# Patient Record
Sex: FEMALE | Race: WHITE | Employment: OTHER | URBAN - METROPOLITAN AREA
[De-identification: names, ages, dates, MRNs, and addresses within clinical notes are randomized per-mention and may not be internally consistent; named-entity substitution may affect disease eponyms.]

---

## 2017-01-30 DIAGNOSIS — E11.40 TYPE 2 DIABETES MELLITUS WITH DIABETIC NEUROPATHY (H): Primary | ICD-10-CM

## 2017-01-30 DIAGNOSIS — E11.40 TYPE 2 DIABETES MELLITUS WITH DIABETIC NEUROPATHY, UNSPECIFIED LONG TERM INSULIN USE STATUS: Primary | ICD-10-CM

## 2017-01-30 DIAGNOSIS — E11.69 TYPE 2 DIABETES MELLITUS WITH OTHER SPECIFIED COMPLICATION (H): Primary | ICD-10-CM

## 2017-01-30 RX ORDER — GLIPIZIDE 10 MG/1
10 TABLET, FILM COATED, EXTENDED RELEASE ORAL DAILY
Qty: 90 TABLET | Refills: 1 | Status: CANCELLED | OUTPATIENT
Start: 2017-01-30

## 2017-01-30 RX ORDER — GLIPIZIDE 10 MG/1
10 TABLET, FILM COATED, EXTENDED RELEASE ORAL DAILY
Qty: 90 TABLET | Refills: 2 | Status: SHIPPED
Start: 2017-01-30 | End: 2017-02-23 | Stop reason: DRUGHIGH

## 2017-01-30 NOTE — TELEPHONE ENCOUNTER
glipiZIDE (GLIPIZIDE XL) 10 MG 24 hr tablet      Last Written Prescription Date:  3/30/2016  Last Fill Quantity: 90,   # refills: 2  Last Office Visit : 9/14/2016  Future Office visit:  Not scheduled    CREATININE   Date Value Ref Range Status   04/27/2016 0.59 0.52 - 1.04 mg/dL Final   ]  LDL CHOLESTEROL CALCULATED   Date Value Ref Range Status   12/29/2016 57 <100 mg/dL Final     Comment:     Desirable:       <100 mg/dl   ]    MICROL       <5   9/2/2015     BP Readings from Last 1 Encounters:   09/14/16 121/72     A1C      9.5   12/29/2016           Routing refill request to provider for review/approval because:  Per protocol.  Lab overdue- last Microalbumin > 12 months since last done

## 2017-01-30 NOTE — TELEPHONE ENCOUNTER
insulin pen needle 31G X 8 MM       Last Written Prescription Date:  1/25/2016  Last Fill Quantity: 200,   # refills: 10  Last Office Visit : 9/14/2016  Future Office visit:  Not scheduled

## 2017-02-08 ENCOUNTER — OFFICE VISIT (OUTPATIENT)
Dept: PHARMACY | Facility: CLINIC | Age: 72
End: 2017-02-08
Payer: COMMERCIAL

## 2017-02-08 ENCOUNTER — OFFICE VISIT (OUTPATIENT)
Dept: FAMILY MEDICINE | Facility: CLINIC | Age: 72
End: 2017-02-08

## 2017-02-08 VITALS
OXYGEN SATURATION: 98 % | HEART RATE: 82 BPM | SYSTOLIC BLOOD PRESSURE: 136 MMHG | RESPIRATION RATE: 16 BRPM | DIASTOLIC BLOOD PRESSURE: 77 MMHG

## 2017-02-08 DIAGNOSIS — Z12.11 SCREEN FOR COLON CANCER: ICD-10-CM

## 2017-02-08 DIAGNOSIS — E11.41 TYPE 2 DIABETES MELLITUS WITH DIABETIC MONONEUROPATHY, UNSPECIFIED LONG TERM INSULIN USE STATUS: Primary | ICD-10-CM

## 2017-02-08 DIAGNOSIS — F33.0 MAJOR DEPRESSIVE DISORDER, RECURRENT EPISODE, MILD (H): Primary | ICD-10-CM

## 2017-02-08 DIAGNOSIS — E78.5 HYPERLIPIDEMIA LDL GOAL <100: ICD-10-CM

## 2017-02-08 DIAGNOSIS — R52 OCCASIONAL PAIN: ICD-10-CM

## 2017-02-08 DIAGNOSIS — F33.40 MAJOR DEPRESSIVE DISORDER, RECURRENT, IN REMISSION (H): ICD-10-CM

## 2017-02-08 DIAGNOSIS — E11.40 TYPE 2 DIABETES MELLITUS WITH DIABETIC NEUROPATHY, UNSPECIFIED LONG TERM INSULIN USE STATUS: ICD-10-CM

## 2017-02-08 DIAGNOSIS — Z86.0100 HISTORY OF COLONIC POLYPS: ICD-10-CM

## 2017-02-08 DIAGNOSIS — R07.89 CHEST TIGHTNESS OR PRESSURE: ICD-10-CM

## 2017-02-08 LAB
ANION GAP SERPL CALCULATED.3IONS-SCNC: 10 MMOL/L (ref 3–14)
BUN SERPL-MCNC: 11 MG/DL (ref 7–30)
CALCIUM SERPL-MCNC: 9.3 MG/DL (ref 8.5–10.1)
CHLORIDE SERPL-SCNC: 105 MMOL/L (ref 94–109)
CO2 SERPL-SCNC: 25 MMOL/L (ref 20–32)
CREAT SERPL-MCNC: 0.64 MG/DL (ref 0.52–1.04)
GFR SERPL CREATININE-BSD FRML MDRD: ABNORMAL ML/MIN/1.7M2
GLUCOSE SERPL-MCNC: 178 MG/DL (ref 70–99)
INTERPRETATION ECG - MUSE: NORMAL
POTASSIUM SERPL-SCNC: 4.1 MMOL/L (ref 3.4–5.3)
SODIUM SERPL-SCNC: 140 MMOL/L (ref 133–144)

## 2017-02-08 PROCEDURE — 99605 MTMS BY PHARM NP 15 MIN: CPT | Performed by: PHARMACIST

## 2017-02-08 PROCEDURE — 99607 MTMS BY PHARM ADDL 15 MIN: CPT | Performed by: PHARMACIST

## 2017-02-08 RX ORDER — LIRAGLUTIDE 6 MG/ML
1.8 INJECTION SUBCUTANEOUS DAILY
Qty: 9 ML | Refills: 0 | Status: SHIPPED | OUTPATIENT
Start: 2017-02-08 | End: 2017-03-23

## 2017-02-08 RX ORDER — NITROGLYCERIN 0.4 MG/1
TABLET SUBLINGUAL
Qty: 25 TABLET | Refills: 0 | Status: SHIPPED | OUTPATIENT
Start: 2017-02-08 | End: 2019-01-09

## 2017-02-08 RX ORDER — FLUOXETINE 10 MG/1
20 CAPSULE ORAL DAILY
Qty: 60 CAPSULE | Refills: 1 | Status: SHIPPED | OUTPATIENT
Start: 2017-02-08 | End: 2017-04-01

## 2017-02-08 RX ORDER — ACETAMINOPHEN 500 MG
500-1000 TABLET ORAL EVERY 6 HOURS PRN
COMMUNITY

## 2017-02-08 ASSESSMENT — PAIN SCALES - GENERAL: PAINLEVEL: NO PAIN (1)

## 2017-02-08 NOTE — PATIENT INSTRUCTIONS
Primary Care Center Medication Refill Request Information:  * Please contact your pharmacy regarding ANY request for medication refills.  ** [facility] Prescription Fax = [phone]  * Please allow 3 business days for routine medication refills.  * Please allow 5 business days for controlled substance medication refills.     Primary Care Center Test Result notification information:  *You will be notified with in 7-10 days of your appointment day regarding the results of your test.  If you are on MyChart you will be notified as soon as the provider has reviewed the results and signed off on them.    Primary Care Center Phone Number [phone]    Nuclear Medicine  Adenosine Thallium    Name: Lindsay Nieves  YOB: 1945  MRN: [id number]      DATE:  2/8/2017   TIME:  1:06pm      PREP INFO:      NOTHING TO EAT OR DRINK 4 HOURS PRIOR     NO CAFFEINE 24 HOURS PRIOR (COFFEE, DECAF, TEA, SODA)    LOCATION:  REPORT TO THE [facility] WAITING ROOM ON THE 2ND FLOOR, (STREET LEVEL) OF THE [facility], AT THE [facility].    QUESTIONS OR NEED TO RESCHEDULE:  CALL [phone]

## 2017-02-08 NOTE — MR AVS SNAPSHOT
After Visit Summary   2/8/2017    Lindsay Nieves    MRN: [id number]           Patient Information     Date Of Birth          1945        Visit Information        Provider Department      2/8/2017 11:30 AM Maryam Clayton MD PHD [facility]        Today's Diagnoses     Type 2 diabetes mellitus with diabetic mononeuropathy, unspecified long term insulin use status (H)    -  1     Screen for colon cancer         History of colonic polyps         Chest tightness or pressure         Major depressive disorder, recurrent, in remission (H)           Care Instructions    Primary Care Center Medication Refill Request Information:  * Please contact your pharmacy regarding ANY request for medication refills.  ** [facility] Prescription Fax = [phone]  * Please allow 3 business days for routine medication refills.  * Please allow 5 business days for controlled substance medication refills.     Primary Care Center Test Result notification information:  *You will be notified with in 7-10 days of your appointment day regarding the results of your test.  If you are on MyChart you will be notified as soon as the provider has reviewed the results and signed off on them.    Primary Care Center Phone Number [phone]    Nuclear Medicine  Adenosine Thallium    Name: Lindsay Nieves  YOB: 1945  MRN: [id number]      DATE:  2/8/2017   TIME:  1:06pm      PREP INFO:      NOTHING TO EAT OR DRINK 4 HOURS PRIOR     NO CAFFEINE 24 HOURS PRIOR (COFFEE, DECAF, TEA, SODA)    LOCATION:  REPORT TO THE [facility] WAITING ROOM ON THE 2ND FLOOR, (STREET LEVEL) OF THE [facility], AT THE [facility].    QUESTIONS OR NEED TO RESCHEDULE:  CALL [phone]         Follow-ups after your visit        Additional Services     GASTROENTEROLOGY ADULT REF PROCEDURE ONLY       Last Lab Result: CREATININE (mg/dL)  Hx of adenomatous polyps -- having some hx of chest pain which will be  evaluated        Date                     Value                 04/27/2016               0.59             ----------  There is no weight on file to calculate BMI.     Needed:  No  Language:  English    Patient will be contacted to schedule procedure.     Please be aware that coverage of these services is subject to the terms and limitations of your health insurance plan.  Call member services at your health plan with any benefit or coverage questions.  Any procedures must be performed at a [place] facility OR coordinated by your clinic's referral office.    Please bring the following with you to your appointment:    (1) Any X-Rays, CTs or MRIs which have been performed.  Contact the facility where they were done to arrange for  prior to your scheduled appointment.    (2) List of current medications   (3) This referral request   (4) Any documents/labs given to you for this referral                  Follow-up notes from your care team     Return in about 1 month (around 3/8/2017).      Your next 10 appointments already scheduled     Feb 13, 2017 12:30 PM   NM INJECTION with UUNMINJ1   [facility], Nuclear Medicine ([facility])    [address] [place] [address] [id number] [phone]            Feb 13, 2017  1:15 PM   NM SCAN3 with UUNM1   [facility], Nuclear Medicine ([facility])    [address] [place] [address] [id number] [phone]            Feb 13, 2017  1:45 PM   Ekg Stress Nm Adenosine with UUEKGNMS   [facility] ([facility])    [address] [place] [phone]               Feb 13, 2017  2:45 PM   NM MPI ADENOSINE with UUNM1   [facility], Nuclear Medicine ([facility])    [address] [place] [address] [id number] [phone]           For a ONE day exam: Allow 3-4  hours for test. For a TWO day exam: Allow 2 hours PER day for test.  You may need to stop some medicines before the test. Follow your doctor s orders. - If you take a beta blocker: Follow your doctor s specific instructions on taking it prior to and on the day of your exam. - If you take Aggrenox or dipyridamole (Persantine, Permole), stop taking it 48 hours before your test. - If you take Viagra, Cialis or Levitra, stop taking it 48 hours before your test. - If you take theophylline or aminophylline, stop taking it 12 hours before your test.  For patients with diabetes: - If you take insulin, call your diabetes care team. Ask if you should take a 1/2 dose the morning of your test. - If you take diabetes medicine by mouth, don t take it on the morning of your test. Bring it with you to take after the test. (If you have questions, call your diabetes care team.)  Do not take nitrates on the day of your test. Do not wear your Nitro-Patch.  Stop all caffeine 12 hours before the test. This includes coffee, tea, soda pop, chocolate and certain medicines (such as Anacin, Excedrin and NoDoz). Also avoid decaf coffee and tea, as these contain small amounts of caffeine.  No alcohol, smoking or other tobacco for 12 hours before the test.  Stop eating 3 hours before the test. You may drink water.  Please wear a loose two-piece outfit. If you will have an exercise test, bring rubber-soled walking shoes.  When you arrive, please tell us if you: - Have diabetes - Are breastfeeding - May be pregnant - Have a pacemaker of ICD (implantable defibrillator).  Please call your Imaging Department at your exam site with any questions.            Feb 22, 2017  1:30 PM   TELEMEDICINE with Marita Jolley [facility] Medication Therapy Management ([facility])    [address] [place] [id number] [phone]           Note: this is not an onsite visit; there is no need to come to  the facility.            Mar 22, 2017 11:30 AM   (Arrive by 11:15 AM)   Return Visit with Maryam Clayton MD PHD   [facility] ([facility])    [address] [address] [phone]              Future tests that were ordered for you today     Open Future Orders        Priority Expected Expires Ordered    GASTROENTEROLOGY ADULT REF PROCEDURE ONLY Routine 2017    NM Adenosine stress test Routine 2017            Who to contact     Please call your clinic at [phone] to:    Ask questions about your health    Make or cancel appointments    Discuss your medicines    Learn about your test results    Speak to your doctor   If you have compliments or concerns about an experience at your clinic, or if you wish to file a complaint, please contact [facility] Patient Relations at [phone] or email us at [email or web address]         Additional Information About Your Visit        [employer] Information     Backpack is an electronic gateway that provides easy, online access to your medical records. With Backpack, you can request a clinic appointment, read your test results, renew a prescription or communicate with your care team.     To sign up for Backpack visit the website at [email or web address]   You will be asked to enter the access code listed below, as well as some personal information. Please follow the directions to create your username and password.     Your access code is: [email or web address]  Expires: 3/15/2017  6:30 AM     Your access code will  in 90 days. If you need help or a new code, please contact your [facility] Clinic or call [phone] for assistance.        Care EveryWhere ID     This is your Care EveryWhere ID. This could be used by other organizations to access your [facility] medical records  [id number]         Your Vitals Were     Pulse Respirations Pulse Oximetry Breastfeeding?          82 16 98% No         Blood Pressure from Last 3 Encounters:   02/08/17 136/77   09/14/16 121/72   05/12/16 117/64    Weight from Last 3 Encounters:   05/12/16 97.523 kg (215 lb)   04/27/16 97.523 kg (215 lb)   12/05/13 86.183 kg (190 lb)              We Performed the Following     Basic metabolic panel     EKG 12-LEAD CLINIC READ          Today's Medication Changes          These changes are accurate as of: 2/8/17  1:37 PM.  If you have any questions, ask your nurse or doctor.               Start taking these medicines.        Dose/Directions    nitroglycerin 0.4 MG sublingual tablet   Commonly known as:  NITROSTAT   Used for:  Chest tightness or pressure   Started by:  Maryam Clayton MD PHD        For chest pain place 1 tablet under the tongue every 5 minutes for 3 doses. If symptoms persist 5 minutes after 1st dose call 911.   Quantity:  25 tablet   Refills:  0         These medicines have changed or have updated prescriptions.        Dose/Directions    FLUoxetine 10 MG capsule   Commonly known as:  PROzac   This may have changed:  how much to take   Used for:  Major depressive disorder, recurrent episode, mild (H)   Changed by:  Marita Jolley RPH        Dose:  20 mg   Take 2 capsules (20 mg) by mouth daily   Quantity:  60 capsule   Refills:  1       liraglutide 18 MG/3ML soln   Commonly known as:  VICTOZA PEN   This may have changed:  how much to take   Used for:  Type 2 diabetes mellitus with diabetic neuropathy, unspecified long term insulin use status (H)   Changed by:  Marita Jolley RPH        Dose:  1.8 mg   Inject 1.8 mg Subcutaneous daily   Quantity:  9 mL   Refills:  0            Where to get your medicines      These medications were sent to [facility] [id number] AT [facility]  [address] [place] [id number]     Phone:  [phone]    - FLUoxetine  10 MG capsule  - liraglutide 18 MG/3ML soln  - nitroglycerin 0.4 MG sublingual tablet             Primary Care Provider Office Phone # Fax #    Maryam Clayton MD [phone]        [facility] [address] [facility] [address]  [facility] [address]        Thank you!     Thank you for choosing [facility]  for your care. Our goal is always to provide you with excellent care. Hearing back from our patients is one way we can continue to improve our services. Please take a few minutes to complete the written survey that you may receive in the mail after your visit with us. Thank you!             Your Updated Medication List - Protect others around you: Learn how to safely use, store and throw away your medicines at www.disposemymeds.org.          This list is accurate as of: 2/8/17  1:37 PM.  Always use your most recent med list.                   Brand Name Dispense Instructions for use    acetaminophen 500 MG tablet    TYLENOL     Take 500-1,000 mg by mouth every 6 hours as needed for mild pain       ADVIL 200 MG tablet   Generic drug:  ibuprofen      Take 200 mg by mouth every 4 hours as needed.       aspirin 81 MG tablet      Take 81 mg by mouth daily       blood glucose monitoring lancets     1 Box    Use to test blood sugar 2-3 times daily or as directed.  Ok to substitute alternative if insurance prefers.       blood glucose monitoring meter device kit     1 kit    Use to test blood sugar 2-3 times daily or as directed.  Ok to substitute alternative if insurance prefers.       blood glucose monitoring test strip    ACCU-CHEK CORTEZ PLUS    1 Box    Use to test blood sugar 2-3 times daily or as directed.  Ok to substitute alternative if insurance prefers.       calcium-vitamin D-vitamin K 500-500-40 MG-UNT-MCG Chew    VIACTIV     Take 1 tablet by mouth daily       FLUoxetine 10 MG capsule    PROzac    60 capsule    Take 2 capsules (20 mg) by mouth daily       glipiZIDE 10 MG 24 hr  tablet    glipiZIDE XL    90 tablet    Take 1 tablet (10 mg) by mouth daily (morning with breakfast)       insulin pen needle 31G X 8 MM     90 each    Use with Victoza injections once daily.       liraglutide 18 MG/3ML soln    VICTOZA PEN    9 mL    Inject 1.8 mg Subcutaneous daily       metFORMIN 1000 MG tablet    GLUCOPHAGE    180 tablet    Take 1 tablet (1,000 mg) by mouth 2 times daily (with meals)       MULTIVITAMIN WOMEN 50+ PO      Take 1 tablet by mouth daily       nitroglycerin 0.4 MG sublingual tablet    NITROSTAT    25 tablet    For chest pain place 1 tablet under the tongue every 5 minutes for 3 doses. If symptoms persist 5 minutes after 1st dose call 911.       pravastatin 40 MG tablet    PRAVACHOL    180 tablet    Take 2 tablets (80 mg) by mouth daily (please dispense as 40mg tabs)       vitamin D 2000 UNITS tablet      Take 1 tablet by mouth daily.

## 2017-02-08 NOTE — PATIENT INSTRUCTIONS
Recommendations from today's MTM visit:                                                    MTM (medication therapy management) is a service provided by a clinical pharmacist designed to help you get the most of out of your medicines.   Today we reviewed what your medicines are for, how to know if they are working, that your medicines are safe and how to make your medicine regimen as easy as possible.     1. Let me know if you are not given instructions on when to stop metformin.    2. Increase your fluoxetine to 20mg (two 10mg tablets) daily.    3. Increase your Victoza to 1.8mg daily. If you note radiating abdominal pain, go to the emergency room.    Next MTM visit: I will call on the 22nd at 1:30pm. Please have your blood sugars with you.    To schedule another MTM appointment, please call the clinic directly or you may call the MTM scheduling line at [phone] or toll-free at [phone].     My Clinical Pharmacist's contact information:                                                      It was a pleasure seeing you today!  Please feel free to contact me with any questions or concerns you have.      Marita Jolley PharmD  Medication Therapy Management Provider  Phone:[phone]  Pager: [phone]    You may receive a survey about the MTM services you received.  I would appreciate your feedback to help me serve you better in the future. Please fill it out and return it when you can. Your comments will be anonymous.

## 2017-02-08 NOTE — PROGRESS NOTES
SUBJECTIVE/OBJECTIVE:                                                    Lindsay Nieves is a 71 year old female coming in for a follow-up visit for Medication Therapy Management.  She was referred to me from Dr. Clayton.     Chief Complaint: Follow up from our visit on 9/14/16.  Pt is here to discuss elevated blood sugars    Tobacco: No tobacco use   Alcohol: Less than 1 beverages / week    Medication Adherence: no issues reported    Diabetes:  Pt currently taking metformin 1000mg BID, Victoza 1.2mg daily, glipizide 10mg in the morning. Pt is not experiencing side effects.  SMBG: one time daily, less when she's depressed, twice a day on good days.   Ranges (patient reported): 125-130 in the morning, pt not getting many evening readings.  Frequency of hypoglycemia? rarely.  Recent symptoms of high blood sugar? none  Eye exam: up to date  Foot exam: up to date  Microalbumin is < 30 mg/g. Pt is not taking an ACEi/ARB.  Aspirin: Taking 81mg daily and denies side effects  Diet/Exercise: Pt is trying to exercise regularly but is realizing that she needs to get into a daily routine so that she doesn't excuse herself from doing it. She admits also that she has been eating less well since Thanksgiving because she has been more depressed lately.   Pt did get flu shot this winter.    Depression: Pt feels acutely depressed and so she was increased from fluoxetine 10mg to 20mg daily by Dr. Clayton today to see if this improves her mood.    Arthritis: Pt continues to use APAP as needed and occasionally does use ibuprofen or aspirin when her back pain is intense. She knows that she is ideally supposed to use ibuprofen sparingly and aspirin not at all for pain per our previous discussions.    Angina: Pt is having upper chest pain off and on that does not radiate down her arms. She was prescribed nitroglycerin by Dr. Clayton today and will be getting an EKG after our visit today.    Hyperlipidemia: Current therapy includes pravastatin  80mg once daily.  Pt reports no significant myalgias or other side effects.   The 10-year ASCVD risk score (Tommygarcia SHELTON Jr., et al., 2013) is: 15.7%    Values used to calculate the score:      Age: 71 years      Sex: Female      Is an : No      Diabetic: Yes      Tobacco smoker: No      Systolic Blood Pressure: 136 mmHg      Prescribed Antihypertensives: No      HDL Cholesterol: 63 mg/dL      Total Cholesterol: 153 mg/dL    Current labs include:  BP Readings from Last 3 Encounters:   02/08/17 136/77   09/14/16 121/72   05/12/16 117/64     Today's Vitals: There were no vitals taken for this visit. Vitals from visit with Dr. Clayton today (wt declined):  Vital Signs 2/8/2017   Systolic 136   Diastolic 77   Pulse 82   Temperature    Respirations 16   Weight (LB) (No Data)     A1C      9.5   12/29/2016.  CHOL      153   12/29/2016  TRIG      164   12/29/2016  HDL       63   12/29/2016  LDL       57   12/29/2016    Liver Function Studies -   Recent Labs   Lab Test  02/25/13   0826   PROTTOTAL  7.2   ALBUMIN  4.0   BILITOTAL  0.5   ALKPHOS  74   AST  20   ALT  30       Lab Results   Component Value Date    UCRR 46 09/02/2015    MICROL <5 09/02/2015    UMALCR Unable to calculate due to low value 09/02/2015       Last Basic Metabolic Panel:  NA      140   4/27/2016   POTASSIUM      4.3   4/27/2016  CHLORIDE      107   4/27/2016  BUN       13   4/27/2016  CR     0.59   4/27/2016  GFR ESTIMATE   Date Value Ref Range Status   04/27/2016 >90  Non  GFR Calc   >60 mL/min/1.7m2 Final   09/02/2015 >90  Non  GFR Calc   >60 mL/min/1.7m2 Final   10/10/2014 90 >60 mL/min/1.7m2 Final     Comment:     Non  GFR Calc     GFR ESTIMATE IF BLACK   Date Value Ref Range Status   04/27/2016 >90   GFR Calc   >60 mL/min/1.7m2 Final   09/02/2015 >90   GFR Calc   >60 mL/min/1.7m2 Final   10/10/2014 >90   GFR Calc   >60 mL/min/1.7m2 Final     TSH    Date Value Ref Range Status   06/11/2014 1.73 0.4 - 5.0 mU/L Final   ]    Most Recent Immunizations   Administered Date(s) Administered     Hepatitis B 09/14/2016     Influenza (High Dose) 3 valent vaccine 09/26/2016     Influenza (IIV3) 10/03/2012     Pneumococcal (PCV 13) 11/03/2014     Pneumococcal (PCV 7) 04/12/2006     Pneumococcal 23 valent 02/25/2013     TDAP (BOOSTRIX AGES 10-64) 02/25/2013     Tetanus 09/19/2005     Zoster vaccine, live 05/18/2009     ASSESSMENT:                                                    Current medications were reviewed today as discussed above.  Medicare Part D topics discussed:Recommendations to doctor and Medication changes    Medication Adherence: no issues identified    Diabetes: Needs Improvement. Patient is not meeting A1c goal of < 7%. Self monitoring of blood glucose is not at goal of post prandial < 180 mg/dL. Pt would benefit from SFU (glipizide) :  increase dose to 20mg daily  GLP-1 agonist (Victoza) :  increase dose to 1.8mg daily  Increased exercise  Increase in home glucose monitoring.  To avoid ambiguity of reaction to increasing the SFU and GLP-1, we will do one at a time.  Pt would also benefit from treatment of her depression (see below).    Depression: Improving. Pt would benefit from increase in SSRI as planned with Dr. Clayton, watching for increase in anxiety which can occasionally occur with fluoxetine.    Arthritis: Stable.    Angina: Outside of scope. Pt to follow up with Dr. Clayton as instructed.    Hyperlipidemia: Stable.     PLAN:                                                      1. Pt to increase fluoxetine from 10mg to 20mg daily as already discussed with Dr. Clayton. Order sent to avoid insurance coverage issues.    2. Pt to increase Victoza to 1.8mg daily. Order sent.    I spent 60 minutes with this patient today (an extra 15 minutes was spent creating the Medication Action Plan).  All changes were made via collaborative practice agreement with  Maryam Clayton. A copy of the visit note was provided to the patient's primary care provider.     Will follow up in 2 weeks, at which point we will likely add glipizide if blood sugars are not at goal.    The patient was given a summary of these recommendations as an after visit summary.    Marita Jolley PharmD  Medication Therapy Management Provider  Phone:[phone]  Pager: [phone]

## 2017-02-08 NOTE — Clinical Note
"     [facility] MEDICATION THERAPY MANAGEMENT     Date: 2017    Lindsay Nieves  [address] [place] [address]    Dear Ms. Nieves,    Thank you for talking with me on 2017 about your health and medications. Medicare s MTM (Medication Therapy Management) program helps you understand your medications and use them safely.      This letter includes an action plan (Medication Action Plan) and medication list (Personal Medication List). The action plan has steps you should take to help you get the best results from your medications. The medication list will help you keep track of your medications and how to use them the right way.       Have your action plan and medication list with you when you talk with your doctors, pharmacists, and other healthcare providers in your care team.     Ask your doctors, pharmacists, and other healthcare providers to update the action plan and medication list at every visit.     Take your medication list with you if you go to the hospital or emergency room.     Give a copy of the action plan and medication list to your family or caregivers.     If you want to talk about this letter or any of the papers with it, please call   [phone].   We look forward to working with you, your doctors, and other healthcare providers to help you stay healthy through the [facility].     Sincerely,    Marita Jolley      Enclosed: Medication Action Plan and Personal Medication List    MEDICATION ACTION PLAN FOR Lindsay Nieves,  1945     This action plan will help you get the best results from your medications if you:   1. Read \"What we talked about.\"   2. Take the steps listed in the \"What I need to do\" boxes.   3. Fill in \"What I did and when I did it.\"   4. Fill in \"My follow-up plan\" and \"Questions I want to ask.\"     Have this action plan with you when you talk with your doctors, pharmacists, and other healthcare providers in your care team. Share this " with your family or caregivers too.  DATE PREPARED: 2017  What we talked about: You have been feeling more down and would like to increase your fluoxetine                                                  What I need to do:  your new prescription for fluoxetine and take 20mg daily. Let us know if your anxiety worsens.   What I did and when I did it:                                              What we talked about: Your blood sugars and A1c are high                                                What I need to do:  your new prescription for Victoza and take 1.8mg daily. Go to the ER if you have abdominal pain.   What I did and when I did it:                                              My follow-up plan:                 Questions I want to ask:              If you have any questions about your action plan, call Marita Jolley, Phone: [phone] , Monday-Friday 8-4:30pm.           MEDICATION LIST FOR Lindsay Nieves,  1945     This medication list was made for you after we talked. We also used information from your doctor's chart.      Use blank rows to add new medications. Then fill in the dates you started using them.    Cross out medications when you no longer use them. Then write the date and why you stopped using them.    Ask your doctors, pharmacists, and other healthcare providers to update this list at every visit. Keep this list up-to-date with:       Prescription medications    Over the counter drugs     Herbals    Vitamins    Minerals      If you go to the hospital or emergency room, take this list with you. Share this with your family or caregivers too.     DATE PREPARED: 2017  Allergies or side effects: Review of patient's allergies indicates no known allergies.     Medication:  ACCU-CHEK CORTEZ PLUS W/DEVICE KIT      How I use it:  Use to test blood sugar 2-3 times daily or as directed.  Ok to substitute alternative if insurance prefers.      Why I use it:  Diabetes     Prescriber:  Maryam Clayton MD PHD      Date I started using it:       Date I stopped using it:         Why I stopped using it:            Medication:  ACCU-CHEK SOFTCLIX LANCETS MISC      How I use it:  Use to test blood sugar 2-3 times daily or as directed.  Ok to substitute alternative if insurance prefers.      Why I use it: Type 2 diabetes mellitus with diabetic neuropathy (H)    Prescriber:  Maryam Clayton MD PHD      Date I started using it:       Date I stopped using it:         Why I stopped using it:            Medication:  ACETAMINOPHEN 500 MG PO TABS      How I use it:  Take 500-1,000 mg by mouth every 6 hours as needed for mild pain      Why I use it:  Pain    Prescriber:  Patient Reported      Date I started using it:       Date I stopped using it:         Why I stopped using it:            Medication:  ASPIRIN 81 MG PO TABS      How I use it:  Take 81 mg by mouth daily      Why I use it:  Heart Health    Prescriber:  Patient Reported      Date I started using it:       Date I stopped using it:         Why I stopped using it:            Medication:  CALCIUM-VITAMIN D-VITAMIN K 500-500-40 MG-UNT-MCG PO CHEW      How I use it:  Take 1 tablet by mouth daily      Why I use it:  Bone health    Prescriber:  Patient Reported      Date I started using it:       Date I stopped using it:         Why I stopped using it:            Medication:  FLUOXETINE HCL 10 MG PO CAPS      How I use it:  Take 1 capsule (10 mg) by mouth daily      Why I use it: Major depressive disorder, recurrent, in remission (H)    Prescriber:  Maryam Clayton MD PHD      Date I started using it:       Date I stopped using it:         Why I stopped using it:            Medication:  GLIPIZIDE ER 10 MG PO TB24      How I use it:  Take 1 tablet (10 mg) by mouth daily (morning with breakfast)      Why I use it: Type 2 diabetes mellitus with diabetic neuropathy, unspecified long term insulin use status (H)    Prescriber:  Maryam  Faisal Clayton MD PHD      Date I started using it:       Date I stopped using it:         Why I stopped using it:            Medication:  GLUCOSE BLOOD VI STRP      How I use it:  Use to test blood sugar 2-3 times daily or as directed.  Ok to substitute alternative if insurance prefers.      Why I use it: Type 2 diabetes mellitus with diabetic neuropathy (H)    Prescriber:  Maryam Clayton MD PHD      Date I started using it:       Date I stopped using it:         Why I stopped using it:            Medication:  IBUPROFEN 200 MG PO TABS      How I use it:  Take 200 mg by mouth every 4 hours as needed.      Why I use it:  Pain    Prescriber:  Patient Reported      Date I started using it:       Date I stopped using it:         Why I stopped using it:            Medication:  INSULIN PEN NEEDLE 31G X 8 MM MISC      How I use it:  Use with Victoza injections once daily.      Why I use it: Type 2 diabetes mellitus with other specified complication (H)    Prescriber:  Maryam Clayton MD PHD      Date I started using it:       Date I stopped using it:         Why I stopped using it:            Medication:  LIRAGLUTIDE 18 MG/3ML SC SOPN      How I use it:  Inject 1.2 mg Subcutaneous daily      Why I use it: Type 2 diabetes mellitus with diabetic polyneuropathy (H)    Prescriber:  Maryam Clayton MD PHD      Date I started using it:       Date I stopped using it:         Why I stopped using it:            Medication:  METFORMIN HCL 1000 MG PO TABS      How I use it:  Take 1 tablet (1,000 mg) by mouth 2 times daily (with meals)      Why I use it: Type 2 diabetes mellitus with diabetic polyneuropathy (H)    Prescriber:  Maryam Clayton MD PHD      Date I started using it:       Date I stopped using it:         Why I stopped using it:            Medication:  MULTIVITAMIN WOMEN 50+ PO      How I use it:  Take 1 tablet by mouth daily      Why I use it:  General health    Prescriber:  Patient Reported      Date I  started using it:       Date I stopped using it:         Why I stopped using it:            Medication:  NITROGLYCERIN 0.4 MG SL SUBL      How I use it:  For chest pain place 1 tablet under the tongue every 5 minutes for 3 doses. If symptoms persist 5 minutes after 1st dose call 911.      Why I use it: Chest tightness or pressure    Prescriber:  Maryam Clayton MD PHD      Date I started using it:       Date I stopped using it:         Why I stopped using it:            Medication:  PRAVASTATIN SODIUM 40 MG PO TABS      How I use it:  Take 2 tablets (80 mg) by mouth daily (please dispense as 40mg tabs)      Why I use it: Hyperlipidemia LDL goal <100    Prescriber:  Maryam Clayton MD PHD      Date I started using it:       Date I stopped using it:         Why I stopped using it:            Medication:  VITAMIN D 2000 UNITS PO TABS      How I use it:  Take 1 tablet by mouth daily.       Why I use it:      Prescriber:  Patient Reported      Date I started using it:       Date I stopped using it:         Why I stopped using it:            Medication:         How I use it:         Why I use it:      Prescriber:         Date I started using it:       Date I stopped using it:         Why I stopped using it:            Medication:         How I use it:         Why I use it:      Prescriber:         Date I started using it:       Date I stopped using it:         Why I stopped using it:            Medication:         How I use it:         Why I use it:      Prescriber:         Date I started using it:       Date I stopped using it:         Why I stopped using it:              Other Information:     If you have any questions about your action plan, call [phone].    According to the Paperwork Reduction Act of 1995, no persons are required to respond to a collection of information unless it displays a valid OMB control number. The valid B number for this information collection is [id number]. The time required to  complete this information collection is estimated to average 40 minutes per response, including the time to review instructions, searching existing data resources, gather the data needed, and complete and review the information collection. If you have any comments concerning the accuracy of the time estimate(s) or suggestions for improving this form, please write to: CMS, Attn: [employer] Reports Clearance Officer, [address] [place] [phone].

## 2017-02-08 NOTE — Clinical Note
Patient:  Lindsay Nieves  :   1945  MRN:     [id number]        Ms. Lindsay Nieves  [address] [place] [address]        2017    Dear Ms. Nieves,    Thank you for choosing the [facility] for your healthcare needs.  We appreciate the opportunity to serve you.    The following are your recent test results.     Your kidney function is normal - check with the cardiac lab as to when they want you to stop the metformin  Maryam Clayton MD, PhD    Results for orders placed or performed in visit on 17   Basic metabolic panel   Result Value Ref Range    Sodium 140 133 - 144 mmol/L    Potassium 4.1 3.4 - 5.3 mmol/L    Chloride 105 94 - 109 mmol/L    Carbon Dioxide 25 20 - 32 mmol/L    Anion Gap 10 3 - 14 mmol/L    Glucose 178 (H) 70 - 99 mg/dL    Urea Nitrogen 11 7 - 30 mg/dL    Creatinine 0.64 0.52 - 1.04 mg/dL    GFR Estimate >90  Non  GFR Calc   >60 mL/min/1.7m2    GFR Estimate If Black >90   GFR Calc   >60 mL/min/1.7m2    Calcium 9.3 8.5 - 10.1 mg/dL   EKG 12-LEAD CLINIC READ   Result Value Ref Range    Interpretation ECG Click View Image link to view waveform and result

## 2017-02-08 NOTE — PROGRESS NOTES
SUBJECTIVE:  Lindsay Nieves  Is a 71 year old female who presents today for follow up of diabetes mellitus type .2.    Patient is being treated with ASA, oral agents, diet, Byetta/Victoza and oral agents,diet and exercise.   On Metformin 100mg bid and glipizide 10 mg q AM   Patient glucose self monitoring as follows: one time daily, before breakfast , once daily.   Results as follows: fasting glucose- 130  @lastdm3@    BP:   BP Readings from Last 3 Encounters:   02/08/17 136/77   09/14/16 121/72   05/12/16 117/64      Does not check blood pressures outside clinic visits.    Habits:   Social History   Substance Use Topics     Smoking status: Former Smoker     Quit date: 09/20/2000     Smokeless tobacco: Never Used     Alcohol Use: 0.0 oz/week     0 Standard drinks or equivalent per week      Comment:  minimal - 1 drink/wk or 1 drink/mo     Diet: trys to follow the dieet but is having problelm with depression and eats when depressed  ASA  Yes   Exercise no regular exercise program [facility]     Current Concerns  Patient : is concerned about chest pain - mid anterior chest  - 1-3 times/wk - lasts a few minutes,  Has 's nitro  But has not tried it. Does not wake her up - is more frequent.      Also is feeling depressed - not suicidal  Is on prozac.     Patient Active Problem List   Diagnosis     Major depressive disorder, recurrent episode, mild (H)     Acne rosacea     Hyperlipidemia LDL goal <100     Disorder of bone and cartilage     Obesity     Cataracts, bilateral     Vitreous degeneration     History of colonic polyps     ACP (advance care planning)     Type 2 diabetes mellitus with diabetic neuropathy, unspecified long term insulin use status (H)       Medication allergies and current medications reviewed.  See EMR    Review Of Systems  Constitutional:No fevers, chills, or myalgias.  Skin: negative  Eyes: negative  Ears/Nose/Throat: negative  Respiratory: No shortness of  breath, dyspnea on exertion, cough, or hemoptysis  Cardiovascular: negative and as above  Gastrointestinal: negative  Genitourinary: negative  Musculoskeletal:  joint pain and muscle pain and swelling  Neurologic: numbness  And  tingling of feet  Psychiatric: depression  PHQ 9=10  Hematologic/Lymphatic/Immunologic: negative  Endocrine: diabetes       OBJECTIVE:    EXAM:  /77 mmHg  Pulse 82  Resp 16  Wt   SpO2 98%  Breastfeeding? No  There is no weight on file to calculate BMI.  GENERAL APPEARANCE: alert and mildly anxious   EYES: EOMI,  PERRL  NECK: no bruits no palpable thyroid, no nodes   RESP: lungs clear to auscultation - no rales, rhonchi or wheezes  CV: regular rates and rhythm, normal S1 S2, no S3 or S4 and no murmur, click or rub -  EXTREM:  No leg edema                           Foot Exam:  Bilateral dryness on heels, has 2 calluses left foot 3rd MT head and right foot 2nd MT head - no ulceration no corns, DP and PT are bilaterally reduced.  Last monofilament was Jan 2016 and intact - need to repeat next visit.      ASSESSMENT & PLAN: DiabetesType II:        Glycemic Control:   borderline control     Last A1C was 9.5 in Jan   Planning meeting with Marita about medication   Labs ordered as indicated, see EMR orders.  Dietary changes stressed--lower carb by ratio. Avoid snacks, avoid small frequent meals.  Eye exam by Ophthalmology recommended annually  Hemoglobin A1c obtained today  Information on diabetes given to the patient      Blood pressure:  Goal < 130/80mmHg  At goal? Borderline   Planning continue current medication regimen unchanged.  Does not check blood pressures outside clinic visits.       Cholesterol:   Control   good  Goal LDL <  70    Planning continue current medication regimen unchanged.      Smoking:    At goal? No    ASA:  At goal? Yes      Other orders:  Eye referral 6/2016 no retinopathy  Microalbumin creatitine ratio due  Flu shot Yes  Cardiac stress test  scheduled  Microalbumin/ Creatinine ration recommended today  Eye exam by Ophthalmology recommended annually  Meet with Marita - victoza was increased   Needs monofilament test next visit   Follow up in 1 month. with me      2. MDD  Comment: feels more depressed - PHQ9 is 10   P/ will increase prozac to 20mg/d  Doesn't want counseling    3. Chest pain  Comment: sounds like angina in a diabetic    Plan EKG today shows no acute change but shows RBBB             Will get  adenosine stress test within the wk and gave her nitro and explained importance of taking it    F/U 1 month      Maryam Clayton MD PHD

## 2017-02-08 NOTE — NURSING NOTE
Chief Complaint   Patient presents with     Diabetes     Patient is here for a diabetes follow up.     Chest Pain     Patient is here to discuss chest pain that may be caused by stress.      Olga Mays LPN at 11:12 AM on 2/8/2017.

## 2017-02-08 NOTE — MR AVS SNAPSHOT
After Visit Summary   2/8/2017    Lindsay Nieves    MRN: [id number]           Patient Information     Date Of Birth          1945        Visit Information        Provider Department      2[facility]8/2017 12[facility]        Today's Diagnoses     Major depressive disorder, recurrent episode, mild (H)    -  1     Type 2 diabetes mellitus with diabetic neuropathy, unspecified long term insulin use status (H)           Care Instructions    Recommendations from today's MTM visit:                                                    MTM (medication therapy management) is a service provided by a clinical pharmacist designed to help you get the most of out of your medicines.   Today we reviewed what your medicines are for, how to know if they are working, that your medicines are safe and how to make your medicine regimen as easy as possible.     1. Let me know if you are not given instructions on when to stop metformin.    2. Increase your fluoxetine to 20mg (two 10mg tablets) daily.    3. Increase your Victoza to 1.8mg daily. If you note radiating abdominal pain, go to the emergency room.    Next MTM visit: I will call on the 22nd at 1:30pm. Please have your blood sugars with you.    To schedule another MTM appointment, please call the clinic directly or you may call the MTM scheduling line at [phone] or toll-free at [phone].     My Clinical Pharmacist's contact information:                                                      It was a pleasure seeing you today!  Please feel free to contact me with any questions or concerns you have.      Marita Jolley PharmD  Medication Therapy Management Provider  Phone:[phone]  Pager: [phone]    You may receive a survey about the MTM services you received.  I would appreciate your feedback to help me serve you better in the future. Please fill it out and return it when you can. Your comments  "will be anonymous.                Follow-ups after your visit        Your next 10 appointments already scheduled     Feb 22, 2017  1:30 PM   TELEMEDICINE with Marita Jolley RPH   [facility] Medication Therapy Management ([facility])    [address]  [address] [facility] [id number] [phone]           Note: this is not an onsite visit; there is no need to come to the facility.              Future tests that were ordered for you today     Open Future Orders        Priority Expected Expires Ordered    GASTROENTEROLOGY ADULT REF PROCEDURE ONLY Routine 2/9/2017 12/8/2017 2/8/2017    NM Adenosine stress test Routine 2/9/2017 2/8/2018 2/8/2017            Who to contact     If you have questions or need follow up information about today's clinic visit or your schedule please contact [facility] THERAPY MANAGEMENT directly at [phone].  Normal or non-critical lab and imaging results will be communicated to you by ShowMe.tvhart, letter or phone within 4 business days after the clinic has received the results. If you do not hear from us within 7 days, please contact the clinic through [email or web address] or phone. If you have a critical or abnormal lab result, we will notify you by phone as soon as possible.  Submit refill requests through [employer] or call your pharmacy and they will forward the refill request to us. Please allow 3 business days for your refill to be completed.          Additional Information About Your Visit        [employer] Information     [employer] lets you send messages to your doctor, view your test results, renew your prescriptions, schedule appointments and more. To sign up, go to [email or web address] . Click on \"Log in\" on the left side of the screen, which will take you to the Welcome page. Then click on \"Sign up Now\" on the right side of the page.     You will be asked to enter the access code listed below, as well as some personal information. Please follow " the directions to create your username and password.     Your access code is: [email or web address]  Expires: 3/15/2017  6:30 AM     Your access code will  in 90 days. If you need help or a new code, please call your [facility] clinic or [phone].        Care EveryWhere ID     This is your Care EveryWhere ID. This could be used by other organizations to access your [facility] medical records  [id number]         Blood Pressure from Last 3 Encounters:   17 136/77   16 121/72   16 117/64    Weight from Last 3 Encounters:   16 215 lb (97.523 kg)   16 215 lb (97.523 kg)   13 190 lb (86.183 kg)              Today, you had the following     No orders found for display         Today's Medication Changes          These changes are accurate as of: 17 12:45 PM.  If you have any questions, ask your nurse or doctor.               Start taking these medicines.        Dose/Directions    nitroglycerin 0.4 MG sublingual tablet   Commonly known as:  NITROSTAT   Used for:  Chest tightness or pressure   Started by:  Maryam Clayton MD PHD        For chest pain place 1 tablet under the tongue every 5 minutes for 3 doses. If symptoms persist 5 minutes after 1st dose call 911.   Quantity:  25 tablet   Refills:  0         These medicines have changed or have updated prescriptions.        Dose/Directions    FLUoxetine 10 MG capsule   Commonly known as:  PROzac   This may have changed:  how much to take   Used for:  Major depressive disorder, recurrent episode, mild (H)   Changed by:  Marita Jolley RPH        Dose:  20 mg   Take 2 capsules (20 mg) by mouth daily   Quantity:  60 capsule   Refills:  1       liraglutide 18 MG/3ML soln   Commonly known as:  VICTOZA PEN   This may have changed:  how much to take   Used for:  Type 2 diabetes mellitus with diabetic neuropathy, unspecified long term insulin use status (H)   Changed by:  Marita Jolley RPH        Dose:  1.8 mg   Inject 1.8  mg Subcutaneous daily   Quantity:  9 mL   Refills:  0            Where to get your medicines      These medications were sent to [email or web address] [address] [place], [place] - [facility] AT [facility] [address]  [address] [address]     Phone:  [phone]    - FLUoxetine 10 MG capsule  - liraglutide 18 MG/3ML soln  - nitroglycerin 0.4 MG sublingual tablet             Primary Care Provider Office Phone # Fax #    Maryam Clayton MD [phone]        PHYSICIANS [address] [facility] [address]  [facility] [address]        Thank you!     Thank you for choosing [facility] MEDICATION THERAPY MANAGEMENT  for your care. Our goal is always to provide you with excellent care. Hearing back from our patients is one way we can continue to improve our services. Please take a few minutes to complete the written survey that you may receive in the mail after your visit with us. Thank you!             Your Updated Medication List - Protect others around you: Learn how to safely use, store and throw away your medicines at www.disposemymeds.org.          This list is accurate as of: 2/8/17 12:45 PM.  Always use your most recent med list.                   Brand Name Dispense Instructions for use    acetaminophen 500 MG tablet    TYLENOL     Take 500-1,000 mg by mouth every 6 hours as needed for mild pain       ADVIL 200 MG tablet   Generic drug:  ibuprofen      Take 200 mg by mouth every 4 hours as needed.       aspirin 81 MG tablet      Take 81 mg by mouth daily       blood glucose monitoring lancets     1 Box    Use to test blood sugar 2-3 times daily or as directed.  Ok to substitute alternative if insurance prefers.       blood glucose monitoring meter device kit     1 kit    Use to test blood sugar 2-3 times daily or as directed.  Ok to substitute alternative if insurance prefers.       blood glucose monitoring test strip    ACCU-CHEK CORTEZ PLUS    1 Box    Use to test blood sugar 2-3 times daily or  as directed.  Ok to substitute alternative if insurance prefers.       calcium-vitamin D-vitamin K 500-500-40 MG-UNT-MCG Chew    VIACTIV     Take 1 tablet by mouth daily       FLUoxetine 10 MG capsule    PROzac    60 capsule    Take 2 capsules (20 mg) by mouth daily       glipiZIDE 10 MG 24 hr tablet    glipiZIDE XL    90 tablet    Take 1 tablet (10 mg) by mouth daily (morning with breakfast)       insulin pen needle 31G X 8 MM     90 each    Use with Victoza injections once daily.       liraglutide 18 MG/3ML soln    VICTOZA PEN    9 mL    Inject 1.8 mg Subcutaneous daily       metFORMIN 1000 MG tablet    GLUCOPHAGE    180 tablet    Take 1 tablet (1,000 mg) by mouth 2 times daily (with meals)       MULTIVITAMIN WOMEN 50+ PO      Take 1 tablet by mouth daily       nitroglycerin 0.4 MG sublingual tablet    NITROSTAT    25 tablet    For chest pain place 1 tablet under the tongue every 5 minutes for 3 doses. If symptoms persist 5 minutes after 1st dose call 911.       pravastatin 40 MG tablet    PRAVACHOL    180 tablet    Take 2 tablets (80 mg) by mouth daily (please dispense as 40mg tabs)       vitamin D 2000 UNITS tablet      Take 1 tablet by mouth daily.

## 2017-02-09 ASSESSMENT — PATIENT HEALTH QUESTIONNAIRE - PHQ9: SUM OF ALL RESPONSES TO PHQ QUESTIONS 1-9: 10

## 2017-02-13 ENCOUNTER — HOSPITAL ENCOUNTER (OUTPATIENT)
Dept: NUCLEAR MEDICINE | Facility: CLINIC | Age: 72
Setting detail: NUCLEAR MEDICINE
End: 2017-02-13
Attending: FAMILY MEDICINE | Admitting: FAMILY MEDICINE
Payer: MEDICARE

## 2017-02-13 ENCOUNTER — HOSPITAL ENCOUNTER (OUTPATIENT)
Dept: CARDIOLOGY | Facility: CLINIC | Age: 72
Discharge: HOME | End: 2017-02-13
Attending: FAMILY MEDICINE | Admitting: FAMILY MEDICINE
Payer: MEDICARE

## 2017-02-13 DIAGNOSIS — R07.89 CHEST TIGHTNESS OR PRESSURE: ICD-10-CM

## 2017-02-13 PROCEDURE — A9502 TC99M TETROFOSMIN: HCPCS | Performed by: FAMILY MEDICINE

## 2017-02-13 PROCEDURE — 93017 CV STRESS TEST TRACING ONLY: CPT

## 2017-02-13 PROCEDURE — 25000128 H RX IP 250 OP 636: Performed by: INTERNAL MEDICINE

## 2017-02-13 PROCEDURE — 78452 HT MUSCLE IMAGE SPECT MULT: CPT

## 2017-02-13 PROCEDURE — 34300033 ZZH RX 343: Performed by: FAMILY MEDICINE

## 2017-02-13 RX ORDER — REGADENOSON 0.08 MG/ML
0.4 INJECTION, SOLUTION INTRAVENOUS ONCE
Status: COMPLETED | OUTPATIENT
Start: 2017-02-13 | End: 2017-02-13

## 2017-02-13 RX ADMIN — TETROFOSMIN 10.7 MCI.: 0.23 INJECTION, POWDER, LYOPHILIZED, FOR SOLUTION INTRAVENOUS at 12:22

## 2017-02-13 RX ADMIN — TETROFOSMIN 41 MCI.: 0.23 INJECTION, POWDER, LYOPHILIZED, FOR SOLUTION INTRAVENOUS at 14:49

## 2017-02-13 RX ADMIN — REGADENOSON 0.4 MG: 0.08 INJECTION, SOLUTION INTRAVENOUS at 13:34

## 2017-02-21 ENCOUNTER — TELEPHONE (OUTPATIENT)
Dept: INTERNAL MEDICINE | Facility: CLINIC | Age: 72
End: 2017-02-21

## 2017-02-21 NOTE — TELEPHONE ENCOUNTER
Pt requesting results for her Stress test.   Good news - the stress test showed no significant coronary disease.   Dr Clayton  Results of stress test given. Clinic numbers given for questions or concerns.  Elenita Mckay RN 8:26 AM on 2/21/2017.

## 2017-02-22 ENCOUNTER — ALLIED HEALTH/NURSE VISIT (OUTPATIENT)
Dept: PHARMACY | Facility: CLINIC | Age: 72
End: 2017-02-22
Payer: COMMERCIAL

## 2017-02-22 DIAGNOSIS — F33.0 MAJOR DEPRESSIVE DISORDER, RECURRENT EPISODE, MILD (H): ICD-10-CM

## 2017-02-22 DIAGNOSIS — E11.40 TYPE 2 DIABETES MELLITUS WITH DIABETIC NEUROPATHY, UNSPECIFIED LONG TERM INSULIN USE STATUS: Primary | ICD-10-CM

## 2017-02-22 PROCEDURE — 99607 MTMS BY PHARM ADDL 15 MIN: CPT | Performed by: PHARMACIST

## 2017-02-22 PROCEDURE — 99606 MTMS BY PHARM EST 15 MIN: CPT | Performed by: PHARMACIST

## 2017-02-22 NOTE — PROGRESS NOTES
SUBJECTIVE/OBJECTIVE:                                                    Lindsay Nieves is a 71 year old female called for a follow-up visit for Medication Therapy Management.      Chief Complaint: Follow up from our visit on Feb 8th.  Calling pt to follow up on blood sugars.    Tobacco: No tobacco use   Alcohol: Less than 1 beverages / week    Medication Adherence: no issues reported    Diabetes:  Pt currently taking Metformin 1000 BID, glipizide 10mg daily, and did increase her Victoza to 1.8mg from 1.2mg last visit. Pt is not experiencing side effects.  SMBG: one time daily (on average).   Ranges (patient reported): 208 this morning, which is down from last visit, but still high (was closer to 220 right after our last visit)  Yesterday night 220   Frequency of hypoglycemia? Rarely (none since our last visit)  Recent symptoms of high blood sugar? None  Pt is trying to improve her diet and has been exercising more and intends to continue to make improvements to lifestyle.  Depression: Pt increased fluoxetine from 10mg to 20mg daily with Dr. Clayton the same day as last visit. She feels it is an improvement and would like to stick with this regimen.      Current labs include:  BP Readings from Last 3 Encounters:   02/08/17 136/77   09/14/16 121/72   05/12/16 117/64     Today's Vitals: There were no vitals taken for this visit.  Lab Results   Component Value Date    A1C 9.5 12/29/2016   .  Lab Results   Component Value Date    CHOL 153 12/29/2016     Lab Results   Component Value Date    TRIG 164 12/29/2016     Lab Results   Component Value Date    HDL 63 12/29/2016     Lab Results   Component Value Date    LDL 57 12/29/2016       Liver Function Studies -   Recent Labs   Lab Test  02/25/13   0826   PROTTOTAL  7.2   ALBUMIN  4.0   BILITOTAL  0.5   ALKPHOS  74   AST  20   ALT  30       Lab Results   Component Value Date    UCRR 46 09/02/2015    MICROL <5 09/02/2015    UMALCR Unable to calculate due to low value  09/02/2015       Last Basic Metabolic Panel:  Lab Results   Component Value Date     02/08/2017      Lab Results   Component Value Date    POTASSIUM 4.1 02/08/2017     Lab Results   Component Value Date    CHLORIDE 105 02/08/2017     Lab Results   Component Value Date    BUN 11 02/08/2017     Lab Results   Component Value Date    CR 0.64 02/08/2017     GFR Estimate   Date Value Ref Range Status   02/08/2017 >90  Non  GFR Calc   >60 mL/min/1.7m2 Final   04/27/2016 >90  Non  GFR Calc   >60 mL/min/1.7m2 Final   09/02/2015 >90  Non  GFR Calc   >60 mL/min/1.7m2 Final     GFR Estimate If Black   Date Value Ref Range Status   02/08/2017 >90   GFR Calc   >60 mL/min/1.7m2 Final   04/27/2016 >90   GFR Calc   >60 mL/min/1.7m2 Final   09/02/2015 >90   GFR Calc   >60 mL/min/1.7m2 Final     TSH   Date Value Ref Range Status   06/11/2014 1.73 0.4 - 5.0 mU/L Final   ]    Most Recent Immunizations   Administered Date(s) Administered     Hepatitis B 09/14/2016     Influenza (High Dose) 3 valent vaccine 09/26/2016     Influenza (IIV3) 10/03/2012     Pneumococcal (PCV 13) 11/03/2014     Pneumococcal (PCV 7) 04/12/2006     Pneumococcal 23 valent 02/25/2013     TDAP (BOOSTRIX AGES 10-64) 02/25/2013     Tetanus 09/19/2005     Zoster vaccine, live 05/18/2009     ASSESSMENT:                                                    Current medications were reviewed today as discussed above.      Medication Adherence: no issues identified    Diabetes: Needs Improvement. Self monitoring of blood glucose is not at goal of fasting  mg/dL and post prandial < 180 mg/dL. Pt would benefit from SFU (glipizide) :  increase dose to 20mg in the morning. Pt would also benefit from continued work on diet and exercise, as the SFU change alone will not bring her to goal.  Depression: Stable per patient. Pt encouraged to continue to watch for worsening of  anxiety, but reassured that this will not necessarily happen.     PLAN:                                                      1. Pt to increase glipizide XR to 20mg daily and watch for low blood sugars. Order sent.    I spent 30 minutes with this patient today.  All changes were made via collaborative practice agreement with Maryam Clayton. A copy of the visit note was provided to the patient's primary care provider.     Will follow up in 3 weeks via phone.    The patient was mailed a summary of these recommendations as an after visit summary.    Marita Jolley PharmD  Medication Therapy Management Provider  Phone:[phone]  Pager: [phone]

## 2017-02-22 NOTE — MR AVS SNAPSHOT
After Visit Summary   2/22/2017    Lindsay Nieves    MRN: [id number]           Patient Information     Date Of Birth          1945        Visit Information        Provider Department      2/22/2017 1:30 PM Marita Jolley RPH [facility] Medication Therapy Management        Today's Diagnoses     Type 2 diabetes mellitus with diabetic neuropathy, unspecified long term insulin use status (H)    -  1    Major depressive disorder, recurrent episode, mild (H)          Care Instructions    Recommendations from today's MTM visit:                                                      1. Increase your glipizide to 20mg daily in the morning. Watch carefully for low blood sugars, as this medication can cause lows.    Next MTM visit: 3/15 at 12:30pm over the phone    To schedule another MTM appointment, please call the clinic directly or you may call the MTM scheduling line at [phone] or toll-free at [phone].     My Clinical Pharmacist's contact information:                                                      It was a pleasure seeing you today!  Please feel free to contact me with any questions or concerns you have.      Marita Jolley, Martha  Medication Therapy Management Provider  Phone:[phone]  Pager: [phone]    You may receive a survey about the MTM services you received.  I would appreciate your feedback to help me serve you better in the future. Please fill it out and return it when you can. Your comments will be anonymous.                Follow-ups after your visit        Your next 10 appointments already scheduled     Mar 15, 2017 12:30 PM CDT   TELEMEDICINE with ЕЛЕНА Samaniego [facility] Medication Therapy Management ([facility] [place])    [address] [place] [address] [phone]           Note: this is not an onsite visit; there is no need to come to the facility.            Mar 16, 2017   Procedure with Nesha  "Jade Baca MD   [facility] [place] Endoscopy ([facility] [place])    [address] [address]   [phone]           The [facility] is located on the corner of [address] and [address] on the [facility]. It is easily accessible from virtually any point in the [place] area, via [address] and [address].            Mar 22, 2017 11:30 AM CDT   (Arrive by 11:15 AM)   Return Visit with Maryam Clayton MD PhD   [facility] ([facility])    [address]  4th Floor  [facility] [id number] [phone]              Who to contact     If you have questions or need follow up information about today's clinic visit or your schedule please contact [facility] MEDICATION THERAPY MANAGEMENT directly at [phone].  Normal or non-critical lab and imaging results will be communicated to you by MyChart, letter or phone within 4 business days after the clinic has received the results. If you do not hear from us within 7 days, please contact the clinic through [employer] or phone. If you have a critical or abnormal lab result, we will notify you by phone as soon as possible.  Submit refill requests through [employer]marks or call your pharmacy and they will forward the refill request to us. Please allow 3 business days for your refill to be completed.          Additional Information About Your Visit        [employer] Information     [employer]marks lets you send messages to your doctor, view your test results, renew your prescriptions, schedule appointments and more. To sign up, go to [email or web address] . Click on \"Log in\" on the left side of the screen, which will take you to the Welcome page. Then click on \"Sign up Now\" on the right side of the page.     You will be asked to enter the access code listed below, as well as some personal information. Please follow the directions to create your " username and password.     Your access code is: [email or web address]  Expires: 3/15/2017  6:30 AM     Your access code will  in 90 days. If you need help or a new code, please call your [facility] or [phone].        Care EveryWhere ID     This is your Care EveryWhere ID. This could be used by other organizations to access your [facility] medical records  [id number]         Blood Pressure from Last 3 Encounters:   17 136/77   16 121/72   16 117/64    Weight from Last 3 Encounters:   16 215 lb (97.5 kg)   16 215 lb (97.5 kg)   13 190 lb (86.2 kg)              Today, you had the following     No orders found for display         Today's Medication Changes          These changes are accurate as of: 17 11:59 PM.  If you have any questions, ask your nurse or doctor.               These medicines have changed or have updated prescriptions.        Dose/Directions    glipiZIDE 10 MG 24 hr tablet   Commonly known as:  glipiZIDE XL   This may have changed:    - how much to take  - additional instructions   Used for:  Type 2 diabetes mellitus with diabetic neuropathy, unspecified long term insulin use status (H)        Dose:  20 mg   Take 2 tablets (20 mg) by mouth daily   Quantity:  60 tablet   Refills:  1            Where to get your medicines      These medications [facility] to [facility] [address] [place] [address] AT [facility] [address]  [address] [place] [address]     Phone:  [phone]     glipiZIDE 10 MG 24 hr tablet                Primary Care Provider Office Phone # Fax #    Maryam Clayton MD PhD [phone]        PHYSICIANS [address] [facility] [address]  [facility] [address]        Thank you!     Thank you for choosing [facility] MEDICATION THERAPY MANAGEMENT  for your care. Our goal is always to provide you with excellent care. Hearing back from our patients is one way we can continue to improve our services. Please take a few  minutes to complete the written survey that you may receive in the mail after your visit with us. Thank you!             Your Updated Medication List - Protect others around you: Learn how to safely use, store and throw away your medicines at www.disposemymeds.org.          This list is accurate as of: 2/22/17 11:59 PM.  Always use your most recent med list.                   Brand Name Dispense Instructions for use    acetaminophen 500 MG tablet    TYLENOL     Take 500-1,000 mg by mouth every 6 hours as needed for mild pain       ADVIL 200 MG tablet   Generic drug:  ibuprofen      Take 200 mg by mouth every 4 hours as needed.       aspirin 81 MG tablet      Take 81 mg by mouth daily       blood glucose monitoring lancets     1 Box    Use to test blood sugar 2-3 times daily or as directed.  Ok to substitute alternative if insurance prefers.       blood glucose monitoring meter device kit     1 kit    Use to test blood sugar 2-3 times daily or as directed.  Ok to substitute alternative if insurance prefers.       blood glucose monitoring test strip    ACCU-CHEK CORTEZ PLUS    1 Box    Use to test blood sugar 2-3 times daily or as directed.  Ok to substitute alternative if insurance prefers.       calcium-vitamin D-vitamin K 500-500-40 MG-UNT-MCG Chew    VIACTIV     Take 1 tablet by mouth daily       FLUoxetine 10 MG capsule    PROzac    60 capsule    Take 2 capsules (20 mg) by mouth daily       glipiZIDE 10 MG 24 hr tablet    glipiZIDE XL    60 tablet    Take 2 tablets (20 mg) by mouth daily       insulin pen needle 31G X 8 MM     90 each    Use with Victoza injections once daily.       liraglutide 18 MG/3ML soln    VICTOZA PEN    9 mL    Inject 1.8 mg Subcutaneous daily       metFORMIN 1000 MG tablet    GLUCOPHAGE    180 tablet    Take 1 tablet (1,000 mg) by mouth 2 times daily (with meals)       MULTIVITAMIN WOMEN 50+ PO      Take 1 tablet by mouth daily       nitroglycerin 0.4 MG sublingual tablet    NITROSTAT     25 tablet    For chest pain place 1 tablet under the tongue every 5 minutes for 3 doses. If symptoms persist 5 minutes after 1st dose call 911.       pravastatin 40 MG tablet    PRAVACHOL    180 tablet    Take 2 tablets (80 mg) by mouth daily (please dispense as 40mg tabs)       vitamin D 2000 UNITS tablet      Take 1 tablet by mouth daily.

## 2017-02-22 NOTE — PATIENT INSTRUCTIONS
Recommendations from today's MTM visit:                                                      1. Increase your glipizide to 20mg daily in the morning. Watch carefully for low blood sugars, as this medication can cause lows.    Next MTM visit: 3/15 at 12:30pm over the phone    To schedule another MTM appointment, please call the clinic directly or you may call the MTM scheduling line at [phone] or toll-free at [phone].     My Clinical Pharmacist's contact information:                                                      It was a pleasure seeing you today!  Please feel free to contact me with any questions or concerns you have.      Marita Jolley, PharmD  Medication Therapy Management Provider  Phone:[phone]  Pager: [phone]    You may receive a survey about the MTM services you received.  I would appreciate your feedback to help me serve you better in the future. Please fill it out and return it when you can. Your comments will be anonymous.

## 2017-02-23 RX ORDER — GLIPIZIDE 10 MG/1
20 TABLET, FILM COATED, EXTENDED RELEASE ORAL DAILY
Qty: 60 TABLET | Refills: 1 | Status: SHIPPED | OUTPATIENT
Start: 2017-02-23 | End: 2017-05-17

## 2017-03-08 ENCOUNTER — TELEPHONE (OUTPATIENT)
Dept: GASTROENTEROLOGY | Facility: CLINIC | Age: 72
End: 2017-03-08

## 2017-03-08 DIAGNOSIS — Z12.11 ENCOUNTER FOR SCREENING COLONOSCOPY: Primary | ICD-10-CM

## 2017-03-09 ENCOUNTER — TELEPHONE (OUTPATIENT)
Dept: PHARMACY | Facility: CLINIC | Age: 72
End: 2017-03-09

## 2017-03-09 NOTE — TELEPHONE ENCOUNTER
----- Message from Catrina Quevedo RN sent at 3/9/2017  7:38 AM CST -----  Regarding: FW: Dr. Clayton patient  Contact: [phone]  Can you check on this?  Catrina Quevedo RN    ----- Message -----     From: Brenda Obregon RN     Sent: 3/8/2017  10:05 AM       To: Catrina Quevedo RN  Subject: Dr. Clayton patient                                Patient is scheduled for colonoscopy. Was advised to contact PCP for instruction on what to do about her diabetic medications the day of the procedure. She takes glipizide daily in AM; Victoza daily in AM; and metformin BID.

## 2017-03-09 NOTE — TELEPHONE ENCOUNTER
Left VM with patient to call back for instructions. If she calls the clinic and I am not here, please refer to letters section to see the letter I sent out to her today.    Jacqueline MendezD  Medication Therapy Management Provider  Phone:[phone]  Pager: [phone]

## 2017-03-09 NOTE — TELEPHONE ENCOUNTER
Pt called back and info relayed (see patient letter from today).    Marita Jolley PharmD  Medication Therapy Management Provider  Phone:[phone]  Pager: [phone]

## 2017-03-15 ENCOUNTER — ALLIED HEALTH/NURSE VISIT (OUTPATIENT)
Dept: PHARMACY | Facility: CLINIC | Age: 72
End: 2017-03-15
Payer: COMMERCIAL

## 2017-03-15 DIAGNOSIS — E11.40 TYPE 2 DIABETES MELLITUS WITH DIABETIC NEUROPATHY, WITHOUT LONG-TERM CURRENT USE OF INSULIN (H): ICD-10-CM

## 2017-03-15 PROCEDURE — 99606 MTMS BY PHARM EST 15 MIN: CPT | Mod: U4 | Performed by: PHARMACIST

## 2017-03-15 PROCEDURE — 99607 MTMS BY PHARM ADDL 15 MIN: CPT | Mod: U4 | Performed by: PHARMACIST

## 2017-03-15 RX ORDER — LANCETS
EACH MISCELLANEOUS
Qty: 1 BOX | Status: SHIPPED | OUTPATIENT
Start: 2017-03-15 | End: 2018-12-03

## 2017-03-15 NOTE — PROGRESS NOTES
SUBJECTIVE/OBJECTIVE:                                                    Lindsay Nieves is a 71 year old female called for a follow-up visit for Medication Therapy Management.  She was referred to me from Dr. Clayton.     Chief Complaint: Follow up from our visit on 2/22. Pt called to discuss blood sugars.  Tobacco: No tobacco use   Alcohol: Less than 1 beverages / week    Medication Adherence: no issues reported    Diabetes: PT is currently taking Metformin, Victoza, and Glipizide as directed without side effects or complaint of other issues. Pt has been checking morning blood sugars and reports 151 was the lowest but ranging 150-160s usually. PT is continuing to work on lifestyle right now and would prefer to hold off on med changes today as she is having a procedure this week.  Pt is wondering if I can request strips and lancets for her, as she is almost out of her refills.    Current labs include:  BP Readings from Last 3 Encounters:   02/08/17 136/77   09/14/16 121/72   05/12/16 117/64     Today's Vitals: There were no vitals taken for this visit.  Lab Results   Component Value Date    A1C 9.5 12/29/2016   .  Lab Results   Component Value Date    CHOL 153 12/29/2016     Lab Results   Component Value Date    TRIG 164 12/29/2016     Lab Results   Component Value Date    HDL 63 12/29/2016     Lab Results   Component Value Date    LDL 57 12/29/2016       Liver Function Studies -   Recent Labs   Lab Test  02/25/13   0826   PROTTOTAL  7.2   ALBUMIN  4.0   BILITOTAL  0.5   ALKPHOS  74   AST  20   ALT  30       Lab Results   Component Value Date    UCRR 46 09/02/2015    MICROL <5 09/02/2015    UMALCR Unable to calculate due to low value 09/02/2015       Last Basic Metabolic Panel:  Lab Results   Component Value Date     02/08/2017      Lab Results   Component Value Date    POTASSIUM 4.1 02/08/2017     Lab Results   Component Value Date    CHLORIDE 105 02/08/2017     Lab Results   Component Value Date    BUN 11  02/08/2017     Lab Results   Component Value Date    CR 0.64 02/08/2017     GFR Estimate   Date Value Ref Range Status   02/08/2017 >90  Non  GFR Calc   >60 mL/min/1.7m2 Final   04/27/2016 >90  Non  GFR Calc   >60 mL/min/1.7m2 Final   09/02/2015 >90  Non  GFR Calc   >60 mL/min/1.7m2 Final     GFR Estimate If Black   Date Value Ref Range Status   02/08/2017 >90   GFR Calc   >60 mL/min/1.7m2 Final   04/27/2016 >90   GFR Calc   >60 mL/min/1.7m2 Final   09/02/2015 >90   GFR Calc   >60 mL/min/1.7m2 Final     TSH   Date Value Ref Range Status   06/11/2014 1.73 0.4 - 5.0 mU/L Final   ]    Most Recent Immunizations   Administered Date(s) Administered     Hepatitis B 09/14/2016     Influenza (High Dose) 3 valent vaccine 09/26/2016     Influenza (IIV3) 10/03/2012     Pneumococcal (PCV 13) 11/03/2014     Pneumococcal (PCV 7) 04/12/2006     Pneumococcal 23 valent 02/25/2013     TDAP (BOOSTRIX AGES 10-64) 02/25/2013     Tetanus 09/19/2005     Zoster vaccine, live 05/18/2009     ASSESSMENT:                                                    Current medications were reviewed today as discussed above.      Medication Adherence: no issues identified    Diabetes: Improving. Pt will be seeing Dr. Clayton next month at which point pt will be due for an A1c. Based on that value, we will decide how strong the need is to add another agent, such as a small dose of long acting insulin. As pt is getting ready for a procedure and wanting to avoid meds that may cause weight gain at this time, we will hold off on changes until then. Pt would benefit from refills on test strips and lancets.     PLAN:                                                      1. No change to meds today.    2. Strips and lancets ordered.    3. Labs placed for Dr. Clayton's visit (FLP, A1c, Microalb).    I spent 30 minutes with this patient today.  All changes were made via  collaborative practice agreement with Maryam Clayton. A copy of the visit note was provided to the patient's primary care provider.     Will follow up in 1 month.    The patient was given a summary of these recommendations as an after visit summary.    Marita Jolley PharmD  Medication Therapy Management Provider  Phone:[phone]  Pager: [phone]

## 2017-03-15 NOTE — MR AVS SNAPSHOT
After Visit Summary   3/15/2017    Lindsay Nieves    MRN: [id number]           Patient Information     Date Of Birth          1945        Visit Information        Provider Department      3/15/2017 12:30 PM Marita Jolley RPH [facility] Medication Therapy Management        Today's Diagnoses     Type 2 diabetes mellitus with diabetic neuropathy, without long-term current use of insulin (H)          Care Instructions    Recommendations from today's MTM visit:                                                      1. Follow up with Dr. Clayton as scheduled. Have your labs drawn before your visit with her if possible.    Next MTM visit: 1 month    To schedule another MTM appointment, please call the clinic directly or you may call the MTM scheduling line at [phone] or toll-free at [phone].     My Clinical Pharmacist's contact information:                                                      It was a pleasure seeing you today!  Please feel free to contact me with any questions or concerns you have.      Marita Jolley PharmD  Medication Therapy Management Provider  Phone:[phone]  Pager: [phone]    You may receive a survey about the MTM services you received.  I would appreciate your feedback to help me serve you better in the future. Please fill it out and return it when you can. Your comments will be anonymous.                  Follow-ups after your visit        Your next 10 appointments already scheduled     Mar 24, 2017 12:30 PM CDT   TELEMEDICINE with Marita Jolley RPH   [facility] Medication Therapy Management ([facility])    [address] [place] [address] [phone]           Note: this is not an onsite visit; there is no need to come to the facility.            Apr 19, 2017 12:00 PM CDT   (Arrive by 11:45 AM)   Return Visit with Maryam Clayton MD PhD   [facility] ([facility])    [address]  [address] [facility] [id number] [phone]              Who to contact     If you have questions or need follow up information about today's clinic visit or your schedule please contact [facility] MEDICATION THERAPY MANAGEMENT directly at [phone].  Normal or non-critical lab and imaging results will be communicated to you by MyChart, letter or phone within 4 business days after the clinic has received the results. If you do not hear from us within 7 days, please contact the clinic through MyChart or phone. If you have a critical or abnormal lab result, we will notify you by phone as soon as possible.  Submit refill requests through [employer] or call your pharmacy and they will forward the refill request to us. Please allow 3 business days for your refill to be completed.          Additional Information About Your Visit        MyChart Information     [employer] lets you send messages to your doctor, view your test results, renew your prescriptions, schedule appointments and more. To sign up, go to [email or web address] . Click on \"Log in\" on the left side of the screen, which will take you to the Welcome page. Then click on \"Sign up Now\" on the right side of the page.     You will be asked to enter the access code listed below, as well as some personal information. Please follow the directions to create your username and password.     Your access code is: [email or web address]  Expires: 2017 11:07 AM     Your access code will  in 90 days. If you need help or a new code, please call your [facility] clinic or [phone].        Care EveryWhere ID     This is your Care EveryWhere ID. This could be used by other organizations to access your [facility] medical records  [id number]         Blood Pressure from Last 3 Encounters:   17 118/61   17 136/77   16 121/72    Weight from Last 3 Encounters:   17 195 lb (88.5 kg)   16 215 lb (97.5 kg) "   04/27/16 215 lb (97.5 kg)                 Where to get your medicines      These medications were sent to [facility] [address] [place], [place] - [address] AT [facility] [address]  [address] [facility] [address]     Phone:  [phone]     blood glucose monitoring lancets    blood glucose monitoring test strip          Primary Care Provider Office Phone # Fax #    Maryam Clayton MD PhD [phone]        [facility] [address]  [facility] [address]        Thank you!     Thank you for choosing [facility] MEDICATION THERAPY MANAGEMENT  for your care. Our goal is always to provide you with excellent care. Hearing back from our patients is one way we can continue to improve our services. Please take a few minutes to complete the written survey that you may receive in the mail after your visit with us. Thank you!             Your Updated Medication List - Protect others around you: Learn how to safely use, store and throw away your medicines at www.disposemymeds.org.          This list is accurate as of: 3/15/17 11:59 PM.  Always use your most recent med list.                   Brand Name Dispense Instructions for use    acetaminophen 500 MG tablet    TYLENOL     Take 500-1,000 mg by mouth every 6 hours as needed for mild pain       ADVIL 200 MG tablet   Generic drug:  ibuprofen      Take 200 mg by mouth every 4 hours as needed.       aspirin 81 MG tablet      Take 81 mg by mouth daily       blood glucose monitoring lancets     1 Box    Use to test blood sugar 2-3 times daily or as directed.  Ok to substitute alternative if insurance prefers.       blood glucose monitoring meter device kit     1 kit    Use to test blood sugar 2-3 times daily or as directed.  Ok to substitute alternative if insurance prefers.       blood glucose monitoring test strip    ACCU-CHEK CORTEZ PLUS    1 Box    Use to test blood sugar 2-3 times daily or as directed.  Ok to substitute alternative if  insurance prefers.       calcium-vitamin D-vitamin K 500-500-40 MG-UNT-MCG Chew    VIACTIV     Take 1 tablet by mouth daily       FLUoxetine 10 MG capsule    PROzac    60 capsule    Take 2 capsules (20 mg) by mouth daily       glipiZIDE 10 MG 24 hr tablet    glipiZIDE XL    60 tablet    Take 2 tablets (20 mg) by mouth daily       insulin pen needle 31G X 8 MM     90 each    Use with Victoza injections once daily.       metFORMIN 1000 MG tablet    GLUCOPHAGE    180 tablet    Take 1 tablet (1,000 mg) by mouth 2 times daily (with meals)       MULTIVITAMIN WOMEN 50+ PO      Take 1 tablet by mouth daily       nitroglycerin 0.4 MG sublingual tablet    NITROSTAT    25 tablet    For chest pain place 1 tablet under the tongue every 5 minutes for 3 doses. If symptoms persist 5 minutes after 1st dose call 911.       pravastatin 40 MG tablet    PRAVACHOL    180 tablet    Take 2 tablets (80 mg) by mouth daily (please dispense as 40mg tabs)       vitamin D 2000 UNITS tablet      Take 1 tablet by mouth daily.

## 2017-03-15 NOTE — PATIENT INSTRUCTIONS
Recommendations from today's MTM visit:                                                      1. Follow up with Dr. Clayton as scheduled. Have your labs drawn before your visit with her if possible.    Next MTM visit: 1 month    To schedule another MTM appointment, please call the clinic directly or you may call the MTM scheduling line at [phone] or toll-free at [phone].     My Clinical Pharmacist's contact information:                                                      It was a pleasure seeing you today!  Please feel free to contact me with any questions or concerns you have.      Marita Jolley PharmD  Medication Therapy Management Provider  Phone:[phone]  Pager: [phone]    You may receive a survey about the MTM services you received.  I would appreciate your feedback to help me serve you better in the future. Please fill it out and return it when you can. Your comments will be anonymous.

## 2017-03-16 ENCOUNTER — SURGERY (OUTPATIENT)
Age: 72
End: 2017-03-16

## 2017-03-16 ENCOUNTER — HOSPITAL ENCOUNTER (OUTPATIENT)
Facility: CLINIC | Age: 72
Discharge: HOME | End: 2017-03-16
Attending: INTERNAL MEDICINE | Admitting: INTERNAL MEDICINE
Payer: MEDICARE

## 2017-03-16 VITALS
BODY MASS INDEX: 32.49 KG/M2 | SYSTOLIC BLOOD PRESSURE: 118 MMHG | HEIGHT: 65 IN | HEART RATE: 80 BPM | OXYGEN SATURATION: 97 % | DIASTOLIC BLOOD PRESSURE: 61 MMHG | RESPIRATION RATE: 26 BRPM | WEIGHT: 195 LBS

## 2017-03-16 LAB
COLONOSCOPY: NORMAL
GLUCOSE BLDC GLUCOMTR-MCNC: 167 MG/DL (ref 70–99)

## 2017-03-16 PROCEDURE — 25000125 ZZHC RX 250: Performed by: INTERNAL MEDICINE

## 2017-03-16 PROCEDURE — 88305 TISSUE EXAM BY PATHOLOGIST: CPT | Performed by: INTERNAL MEDICINE

## 2017-03-16 PROCEDURE — 25000128 H RX IP 250 OP 636: Performed by: INTERNAL MEDICINE

## 2017-03-16 PROCEDURE — 45380 COLONOSCOPY AND BIOPSY: CPT | Mod: XU,PT | Performed by: INTERNAL MEDICINE

## 2017-03-16 PROCEDURE — 99152 MOD SED SAME PHYS/QHP 5/>YRS: CPT | Performed by: INTERNAL MEDICINE

## 2017-03-16 PROCEDURE — 99153 MOD SED SAME PHYS/QHP EA: CPT | Performed by: INTERNAL MEDICINE

## 2017-03-16 PROCEDURE — G0500 MOD SEDAT ENDO SERVICE >5YRS: HCPCS | Performed by: INTERNAL MEDICINE

## 2017-03-16 PROCEDURE — 82962 GLUCOSE BLOOD TEST: CPT

## 2017-03-16 PROCEDURE — 25000132 ZZH RX MED GY IP 250 OP 250 PS 637: Mod: GY | Performed by: INTERNAL MEDICINE

## 2017-03-16 PROCEDURE — 45385 COLONOSCOPY W/LESION REMOVAL: CPT | Performed by: INTERNAL MEDICINE

## 2017-03-16 RX ORDER — SIMETHICONE
LIQUID (ML) MISCELLANEOUS PRN
Status: DISCONTINUED | OUTPATIENT
Start: 2017-03-16 | End: 2017-03-16 | Stop reason: HOSPADM

## 2017-03-16 RX ORDER — FENTANYL CITRATE 50 UG/ML
INJECTION, SOLUTION INTRAMUSCULAR; INTRAVENOUS PRN
Status: DISCONTINUED | OUTPATIENT
Start: 2017-03-16 | End: 2017-03-16 | Stop reason: HOSPADM

## 2017-03-16 RX ADMIN — FENTANYL CITRATE 50 MCG: 50 INJECTION, SOLUTION INTRAMUSCULAR; INTRAVENOUS at 10:33

## 2017-03-16 RX ADMIN — FENTANYL CITRATE 50 MCG: 50 INJECTION, SOLUTION INTRAMUSCULAR; INTRAVENOUS at 10:27

## 2017-03-16 RX ADMIN — Medication 2 ML: at 10:06

## 2017-03-16 RX ADMIN — FENTANYL CITRATE 50 MCG: 50 INJECTION, SOLUTION INTRAMUSCULAR; INTRAVENOUS at 10:30

## 2017-03-16 RX ADMIN — MIDAZOLAM HYDROCHLORIDE 2 MG: 1 INJECTION, SOLUTION INTRAMUSCULAR; INTRAVENOUS at 10:27

## 2017-03-16 RX ADMIN — MIDAZOLAM HYDROCHLORIDE 1 MG: 1 INJECTION, SOLUTION INTRAMUSCULAR; INTRAVENOUS at 10:29

## 2017-03-16 NOTE — IP AVS SNAPSHOT
[facility], [place], Endoscopy    [address] [facility] [address]    Phone:  [phone]                                       After Visit Summary   3/16/2017    Lindsay Nieves    MRN: [id number]           After Visit Summary Signature Page     I have received my discharge instructions, and my questions have been answered. I have discussed any challenges I see with this plan with the nurse or doctor.    ..........................................................................................................................................  Patient/Patient Representative Signature      ..........................................................................................................................................  Patient Representative Print Name and Relationship to Patient    ..................................................               ................................................  Date                                            Time    ..........................................................................................................................................  Reviewed by Signature/Title    ...................................................              ..............................................  Date                                                            Time

## 2017-03-16 NOTE — LETTER
March 20, 2017       TO: Lindsay Mendoza  [address] [place] [address]       Dear Ms. Mendoza,    We are writing to inform you of your test results.    The polyp tissue removed from your colon showed no evidence of cancer, but was identified as adenomatous.  These types of polyps can progress to cancer if left in the colon.    Although your polyp tissue was completely removed, we know that you may develop more polyps in the future.  The results and recommendations regarding a follow-up colonoscopy were sent to your primary physician.  He or she will review our recommendation in the context of your other medical problems.  Please remember that our recommendation is only for individuals who have no symptoms.  If you experience a persistent change in bowel habits, or develop new abdominal pain or bleeding in your stools, please consult your primary care physician.    Repeat colonoscopy in 3 years.    If you have questions, please feel free to make an appointment with your primary care physician or gastroenterology clinic.        Resulted Orders   Surgical pathology exam   Result Value Ref Range    Copath Report       Patient Name: LINDSAY MENDOZA  MR#: [id number]  Specimen #: [id number]  Collected: 3/16/2017  Received: 3/16/2017  Reported: 3/18/2017 16:06  Ordering Phy(s): GRETCHEN BALLARD    For improved result formatting, select 'View Enhanced Report Format'  under Linked Documents section.    SPECIMEN(S):  A: Colon polyp, ascending  B: Rectal polyps    FINAL DIAGNOSIS:  A. COLON, ASCENDING POLYP, POLYPECTOMY:  - Tubular adenoma    B. COLON, RECTAL POLYPS, POLYPECTOMIES:  - Hyperplastic polyps x 5    I have personally reviewed all specimens and or slides, including the  listed special stains, and used them with my medical judgement to  determine the final diagnosis.    Electronically signed out by:    Ashleigh Montano M.D., PhD, Physicians    CLINICAL HISTORY:  High risk colon cancer  "surveillance: Personal history of colonic polyps    GROSS:  A:  The specimen is received in formalin with proper patient  identification, labeled \"ascending colon polyp\".  The specimen consists  of two pin k-tan soft tissue fragments measuring 0.1 and 0.5 cm in  greatest dimension, which are entirely submitted in cassette A1.    B:  The specimen is received in formalin with proper patient  identification, labeled \"rectal polyps\".  The specimen consists of seven  red-brown soft tissue fragments ranging in size from 0.1-0.2 cm in  greatest dimension, which are entirely submitted in cassette B1.    (Dictated by: Araceli Flores 3/16/2017 01:16 PM)    MICROSCOPIC:  Microscopic examination was performed.    CPT Codes:  A: 59664-DI1  B: 80037-YI1    TESTING LAB LOCATION:  [facility], [address]   [address]  [phone]    COLLECTION SITE:  Client: [facility]  Location: [facility] (B)           It was a pleasure to see you at your recent visit. Please let me know if you have any questions or concerns.     Clinic Staff - [phone]     Sincerely,     Nesha Baca MD  [address] [place] [id number]          "

## 2017-03-16 NOTE — IP AVS SNAPSHOT
MRN:[id number]                      After Visit Summary   3/16/2017    Lindsay Nieves    MRN: [id number]           Thank you!     Thank you for choosing [facility] for your care. Our goal is always to provide you with excellent care. Hearing back from our patients is one way we can continue to improve our services. Please take a few minutes to complete the written survey that you may receive in the mail after you visit with us. Thank you!        Patient Information     Date Of Birth          1945        About your hospital stay     You were admitted on:  March 16, 2017 You last received care in the:  [facility], Endoscopy    You were discharged on:  March 16, 2017       Who to Call     For medical emergencies, please call 911.  For non-urgent questions about your medical care, please call your primary care provider or clinic, [phone]  For questions related to your surgery, please call your surgery clinic        Attending Provider     Provider Specialty    Nesha Baca MD Internal Medicine       Primary Care Provider Office Phone # Fax #    Maryam Clayton MD PhD [phone]        [facility] [address] [place] [id number]        Your next 10 appointments already scheduled     Mar 22, 2017 11:30 AM CDT   (Arrive by 11:15 AM)   Return Visit with Maryam Clayton MD PhD   [facility] ([facility])    [address] [address] [phone]              Further instructions from your care team       Discharge Instructions after Colonoscopy  or Sigmoidoscopy    Today you had a _x___ Colonoscopy     Activity and Diet  You were given medicine for pain. You may be dizzy or sleepy.  For 24 hours:    Do not drive or use heavy equipment.    Do not make important decisions.    Do not drink any alcohol.  You may return to your normal diet and medicines.    Discomfort     "Air was placed in your colon during the exam in order to see it. Walking helps to pass the air.    You may take Tylenol (acetaminophen) for pain unless your doctor has told you not to.  Do not take aspirin or ibuprofen (Advil, Motrin, or other anti-inflammatory  drugs) for _____ days.    Follow-up  __x__ We took small tissue samples or polyps to study. Your doctor will call you with the results  within two weeks.    When to call:    Call right away if you have:    Unusual pain in belly or chest pain not relieved with passing air.    More than 1 to 2 Tablespoons of bleeding from your rectum.    Fever above 100.6  F (37.5  C).    If you have severe pain, bleeding, or shortness of breath, go to an emergency room.    If you have questions, call:  Monday to Friday, 7 a.m. to 4:30 p.m.  Endoscopy: [phone] (We may have to call you back)    After hours  Hospital: [phone] (Ask for the GI fellow on call)    Pending Results     No orders found from 3/14/2017 to 3/17/2017.            Admission Information     Date & Time Provider Department Dept. Phone    3/16/2017 Nesha Bcaa MD [facility] [place], Endoscopy [phone]      Your Vitals Were     Blood Pressure Pulse Respirations Height Weight Pulse Oximetry    104/58 80 12 1.638 m (5' 4.5\") 88.5 kg (195 lb) 96%    BMI (Body Mass Index)                   32.95 kg/m2           [employer] Information     [employer] lets you send messages to your doctor, view your test results, renew your prescriptions, schedule appointments and more. To sign up, go to [email or web address] . Click on \"Log in\" on the left side of the screen, which will take you to the Welcome page. Then click on \"Sign up Now\" on the right side of the page.     You will be asked to enter the access code listed below, as well as some personal information. Please follow the directions to create your username and password.     Your access code is: [email or web address]  Expires: 6/14/2017 11:07 AM     Your " access code will  in 90 days. If you need help or a new code, please call your [facility] clinic or [phone].        Care EveryWhere ID     This is your Care EveryWhere ID. This could be used by other organizations to access your [facility] medical records  [id number]           Review of your medicines      UNREVIEWED medicines. Ask your doctor about these medicines        Dose / Directions    acetaminophen 500 MG tablet   Commonly known as:  TYLENOL        Dose:  500-1000 mg   Take 500-1,000 mg by mouth every 6 hours as needed for mild pain   Refills:  0       ADVIL 200 MG tablet   Generic drug:  ibuprofen        Dose:  200 mg   Take 200 mg by mouth every 4 hours as needed.   Refills:  0       aspirin 81 MG tablet        Dose:  81 mg   Take 81 mg by mouth daily   Refills:  0       calcium-vitamin D-vitamin K 500-500-40 MG-UNT-MCG Chew   Commonly known as:  VIACTIV        Dose:  1 tablet   Take 1 tablet by mouth daily   Refills:  0       FLUoxetine 10 MG capsule   Commonly known as:  PROzac   Used for:  Major depressive disorder, recurrent episode, mild (H)        Dose:  20 mg   Take 2 capsules (20 mg) by mouth daily   Quantity:  60 capsule   Refills:  1       glipiZIDE 10 MG 24 hr tablet   Commonly known as:  glipiZIDE XL   Used for:  Type 2 diabetes mellitus with diabetic neuropathy, unspecified long term insulin use status (H)        Dose:  20 mg   Take 2 tablets (20 mg) by mouth daily   Quantity:  60 tablet   Refills:  1       liraglutide 18 MG/3ML soln   Commonly known as:  VICTOZA PEN   Used for:  Type 2 diabetes mellitus with diabetic neuropathy, unspecified long term insulin use status (H)        Dose:  1.8 mg   Inject 1.8 mg Subcutaneous daily   Quantity:  9 mL   Refills:  0       metFORMIN 1000 MG tablet   Commonly known as:  GLUCOPHAGE   Used for:  Type 2 diabetes mellitus with diabetic polyneuropathy (H)        Dose:  1000 mg   Take 1 tablet (1,000 mg) by mouth 2 times daily (with meals)    Quantity:  180 tablet   Refills:  4       MULTIVITAMIN WOMEN 50+ PO        Dose:  1 tablet   Take 1 tablet by mouth daily   Refills:  0       nitroglycerin 0.4 MG sublingual tablet   Commonly known as:  NITROSTAT   Used for:  Chest tightness or pressure        For chest pain place 1 tablet under the tongue every 5 minutes for 3 doses. If symptoms persist 5 minutes after 1st dose call 911.   Quantity:  25 tablet   Refills:  0       pravastatin 40 MG tablet   Commonly known as:  PRAVACHOL   Used for:  Hyperlipidemia LDL goal <100        Dose:  80 mg   Take 2 tablets (80 mg) by mouth daily (please dispense as 40mg tabs)   Quantity:  180 tablet   Refills:  3       vitamin D 2000 UNITS tablet        Dose:  1 tablet   Take 1 tablet by mouth daily.   Refills:  0         CONTINUE these medicines which have NOT CHANGED        Dose / Directions    blood glucose monitoring lancets   Used for:  Type 2 diabetes mellitus with diabetic neuropathy, without long-term current use of insulin (H)        Use to test blood sugar 2-3 times daily or as directed.  Ok to substitute alternative if insurance prefers.   Quantity:  1 Box   Refills:  prn       blood glucose monitoring meter device kit        Use to test blood sugar 2-3 times daily or as directed.  Ok to substitute alternative if insurance prefers.   Quantity:  1 kit   Refills:  0       blood glucose monitoring test strip   Commonly known as:  ACCU-CHEK CORTEZ PLUS   Used for:  Type 2 diabetes mellitus with diabetic neuropathy, without long-term current use of insulin (H)        Use to test blood sugar 2-3 times daily or as directed.  Ok to substitute alternative if insurance prefers.   Quantity:  1 Box   Refills:  prn       insulin pen needle 31G X 8 MM   Used for:  Type 2 diabetes mellitus with other specified complication (H)        Use with Victoza injections once daily.   Quantity:  90 each   Refills:  3                Protect others around you: Learn how to safely use,  store and throw away your medicines at www.disposemymeds.org.             Medication List: This is a list of all your medications and when to take them. Check marks below indicate your daily home schedule. Keep this list as a reference.      Medications           Morning Afternoon Evening Bedtime As Needed    acetaminophen 500 MG tablet   Commonly known as:  TYLENOL   Take 500-1,000 mg by mouth every 6 hours as needed for mild pain                                ADVIL 200 MG tablet   Take 200 mg by mouth every 4 hours as needed.   Generic drug:  ibuprofen                                aspirin 81 MG tablet   Take 81 mg by mouth daily                                blood glucose monitoring lancets   Use to test blood sugar 2-3 times daily or as directed.  Ok to substitute alternative if insurance prefers.                                blood glucose monitoring meter device kit   Use to test blood sugar 2-3 times daily or as directed.  Ok to substitute alternative if insurance prefers.                                blood glucose monitoring test strip   Commonly known as:  ACCU-CHEK CORTEZ PLUS   Use to test blood sugar 2-3 times daily or as directed.  Ok to substitute alternative if insurance prefers.                                calcium-vitamin D-vitamin K 500-500-40 MG-UNT-MCG Chew   Commonly known as:  VIACTIV   Take 1 tablet by mouth daily                                FLUoxetine 10 MG capsule   Commonly known as:  PROzac   Take 2 capsules (20 mg) by mouth daily                                glipiZIDE 10 MG 24 hr tablet   Commonly known as:  glipiZIDE XL   Take 2 tablets (20 mg) by mouth daily                                insulin pen needle 31G X 8 MM   Use with Victoza injections once daily.                                liraglutide 18 MG/3ML soln   Commonly known as:  VICTOZA PEN   Inject 1.8 mg Subcutaneous daily                                metFORMIN 1000 MG tablet   Commonly known as:  GLUCOPHAGE    Take 1 tablet (1,000 mg) by mouth 2 times daily (with meals)                                MULTIVITAMIN WOMEN 50+ PO   Take 1 tablet by mouth daily                                nitroglycerin 0.4 MG sublingual tablet   Commonly known as:  NITROSTAT   For chest pain place 1 tablet under the tongue every 5 minutes for 3 doses. If symptoms persist 5 minutes after 1st dose call 911.                                pravastatin 40 MG tablet   Commonly known as:  PRAVACHOL   Take 2 tablets (80 mg) by mouth daily (please dispense as 40mg tabs)                                vitamin D 2000 UNITS tablet   Take 1 tablet by mouth daily.

## 2017-03-16 NOTE — OR NURSING
Colonoscopy under conscious sedation wearing 2lpm 02 via NC.  Polyp removed with cold snare.  Rectal polyps removed with biopsy forceps.  Pt on left side for exam, tolerated procedure.  Post exam, pt denies SOB/CP/abd pain

## 2017-03-16 NOTE — DISCHARGE INSTRUCTIONS
Discharge Instructions after Colonoscopy  or Sigmoidoscopy    Today you had a _x___ Colonoscopy     Activity and Diet  You were given medicine for pain. You may be dizzy or sleepy.  For 24 hours:    Do not drive or use heavy equipment.    Do not make important decisions.    Do not drink any alcohol.  You may return to your normal diet and medicines.    Discomfort    Air was placed in your colon during the exam in order to see it. Walking helps to pass the air.    You may take Tylenol (acetaminophen) for pain unless your doctor has told you not to.  Do not take aspirin or ibuprofen (Advil, Motrin, or other anti-inflammatory  drugs) for _____ days.    Follow-up  __x__ We took small tissue samples or polyps to study. Your doctor will call you with the results  within two weeks.    When to call:    Call right away if you have:    Unusual pain in belly or chest pain not relieved with passing air.    More than 1 to 2 Tablespoons of bleeding from your rectum.    Fever above 100.6  F (37.5  C).    If you have severe pain, bleeding, or shortness of breath, go to an emergency room.    If you have questions, call:  Monday to Friday, 7 a.m. to 4:30 p.m.  Endoscopy: [phone] (We may have to call you back)    After hours  Hospital: [phone] (Ask for the GI fellow on call)

## 2017-03-18 LAB — COPATH REPORT: NORMAL

## 2017-03-23 ENCOUNTER — TELEPHONE (OUTPATIENT)
Dept: FAMILY MEDICINE | Facility: CLINIC | Age: 72
End: 2017-03-23

## 2017-03-23 DIAGNOSIS — E11.40 TYPE 2 DIABETES MELLITUS WITH DIABETIC NEUROPATHY, UNSPECIFIED LONG TERM INSULIN USE STATUS: ICD-10-CM

## 2017-03-23 RX ORDER — LIRAGLUTIDE 6 MG/ML
1.8 INJECTION SUBCUTANEOUS DAILY
Qty: 9 ML | Refills: 0 | Status: SHIPPED | OUTPATIENT
Start: 2017-03-23 | End: 2017-04-26

## 2017-03-23 NOTE — TELEPHONE ENCOUNTER
Victoza      Last Written Prescription Date:  2-8-17  Last Fill Quantity: 9 ml,   # refills: 0  Last Office Visit : 2-8-17  Future Office visit:  4-19-17    Lab Results   Component Value Date    A1C 9.5 12/29/2016    A1C 7.5 04/27/2016    A1C 8.7 01/25/2016    A1C 7.8 09/02/2015    A1C 8.4 11/04/2014     Lab Results   Component Value Date    MICROL <5 09/02/2015     No results found for: MICROALBUMIN      Kathleen M Doege RN

## 2017-03-23 NOTE — TELEPHONE ENCOUNTER
----- Message from Catrina Quevedo RN sent at 3/23/2017 12:31 PM CDT -----  Regarding: FW: refill      ----- Message -----     From: Brenda Obregon RN     Sent: 3/23/2017  11:48 AM       To: Catrina Quevedo RN  Subject: refill                                           Victoza refill needed. [facility] on [address] [facility] [place].

## 2017-04-01 DIAGNOSIS — F33.0 MAJOR DEPRESSIVE DISORDER, RECURRENT EPISODE, MILD (H): ICD-10-CM

## 2017-04-01 RX ORDER — FLUOXETINE 10 MG/1
20 CAPSULE ORAL DAILY
Qty: 180 CAPSULE | Refills: 3 | Status: SHIPPED | OUTPATIENT
Start: 2017-04-01 | End: 2019-01-09

## 2017-04-01 NOTE — TELEPHONE ENCOUNTER
FLUoxetine (PROZAC) 10 MG capsule  Last Written Prescription Date:  2/8/17  Last Fill Quantity: 60,   # refills: 1  Last Office Visit with [facility], [facility] or [facility] prescribing provider: 2/8/17  Future Office visit:   4/19/17

## 2017-04-26 DIAGNOSIS — E11.40 TYPE 2 DIABETES MELLITUS WITH DIABETIC NEUROPATHY, UNSPECIFIED LONG TERM INSULIN USE STATUS: ICD-10-CM

## 2017-04-26 RX ORDER — LIRAGLUTIDE 6 MG/ML
1.8 INJECTION SUBCUTANEOUS DAILY
Qty: 9 ML | Refills: 0 | Status: SHIPPED | OUTPATIENT
Start: 2017-04-26 | End: 2017-05-17

## 2017-04-26 NOTE — TELEPHONE ENCOUNTER
liraglutide VICTOZA PEN      Last Written Prescription Date:  3/23/17  Last Fill Quantity: 9 ml,   # refills: 0  Last Office Visit : 2/8/17  Future Office visit:   NONE  Creatinine   Date Value Ref Range Status   02/08/2017 0.64 0.52 - 1.04 mg/dL Final     Lab Results   Component Value Date    A1C 9.5 12/29/2016     Lab Results   Component Value Date    MICROL <5 09/02/2015          Routing refill request to provider for review/approval because: OVER DUE LABS

## 2017-05-12 DIAGNOSIS — E11.40 TYPE 2 DIABETES MELLITUS WITH DIABETIC NEUROPATHY, WITHOUT LONG-TERM CURRENT USE OF INSULIN (H): ICD-10-CM

## 2017-05-12 LAB
CREAT UR-MCNC: 25 MG/DL
HBA1C MFR BLD: 8.4 % (ref 4.3–6)
MICROALBUMIN UR-MCNC: <5 MG/L
MICROALBUMIN/CREAT UR: NORMAL MG/G CR (ref 0–25)
TSH SERPL DL<=0.005 MIU/L-ACNC: 2.38 MU/L (ref 0.4–4)

## 2017-05-17 ENCOUNTER — OFFICE VISIT (OUTPATIENT)
Dept: PHARMACY | Facility: CLINIC | Age: 72
End: 2017-05-17
Payer: COMMERCIAL

## 2017-05-17 ENCOUNTER — OFFICE VISIT (OUTPATIENT)
Dept: FAMILY MEDICINE | Facility: CLINIC | Age: 72
End: 2017-05-17

## 2017-05-17 VITALS — SYSTOLIC BLOOD PRESSURE: 120 MMHG | DIASTOLIC BLOOD PRESSURE: 75 MMHG | HEART RATE: 87 BPM | RESPIRATION RATE: 18 BRPM

## 2017-05-17 DIAGNOSIS — E11.40 TYPE 2 DIABETES MELLITUS WITH DIABETIC NEUROPATHY, UNSPECIFIED LONG TERM INSULIN USE STATUS: ICD-10-CM

## 2017-05-17 DIAGNOSIS — E11.40 TYPE 2 DIABETES MELLITUS WITH DIABETIC NEUROPATHY, WITHOUT LONG-TERM CURRENT USE OF INSULIN (H): Primary | ICD-10-CM

## 2017-05-17 PROCEDURE — 99606 MTMS BY PHARM EST 15 MIN: CPT | Performed by: PHARMACIST

## 2017-05-17 PROCEDURE — 99607 MTMS BY PHARM ADDL 15 MIN: CPT | Performed by: PHARMACIST

## 2017-05-17 RX ORDER — LIRAGLUTIDE 6 MG/ML
1.8 INJECTION SUBCUTANEOUS DAILY
Qty: 9 ML | Refills: 3 | Status: SHIPPED | OUTPATIENT
Start: 2017-05-17 | End: 2017-09-25

## 2017-05-17 RX ORDER — GLIPIZIDE 10 MG/1
20 TABLET, FILM COATED, EXTENDED RELEASE ORAL DAILY
Qty: 180 TABLET | Refills: 3 | Status: SHIPPED | OUTPATIENT
Start: 2017-05-17 | End: 2017-11-29

## 2017-05-17 ASSESSMENT — PAIN SCALES - GENERAL: PAINLEVEL: NO PAIN (1)

## 2017-05-17 NOTE — PROGRESS NOTES
SUBJECTIVE:  Lindsay Nieves  Is a 71 year old female who presents today for follow up of diabetes mellitus type .2.    Patient is being treated with ASA, oral agents, diet, exercise, Byetta/Victoza and oral agents,diet and exercise.    Patient glucose self monitoring as follows: two times daily, before breakfast and 2 hrs after supper.   Results as follows: fasting glucose- 218, 173, 150 and post-supper glucose- 151, 120  @lastdm3@ 8.4    BP:   BP Readings from Last 3 Encounters:   05/17/17 120/75   03/16/17 118/61   02/08/17 136/77      Does not check blood pressures outside clinic visits.    Habits:   Social History   Substance Use Topics     Smoking status: Former Smoker     Quit date: 9/20/2000     Smokeless tobacco: Never Used     Alcohol use 0.0 oz/week     0 Standard drinks or equivalent per week      Comment:  minimal - 1 drink/wk or 1 drink/mo     Diet: tries to watch diet  ASA  Yes   Exercise 15 minutes/day  walking daily to every other day     Current Concerns  Patient : Had hx of chest pain and EKG in 2/2017 showed RBBB - Had adenosine stress in 2/2017 and  Normal myocardial SPECT study with a summed stress score of 0. A  summed stress score of 0 is associated with an annual event rate of  0.5% for myocardial infarction and cardiac death, respectively    Patient Active Problem List   Diagnosis     Major depressive disorder, recurrent episode, mild (H)     Acne rosacea     Hyperlipidemia LDL goal <100     Disorder of bone and cartilage     Obesity     Cataracts, bilateral     Vitreous degeneration     History of colonic polyps     ACP (advance care planning)     Type 2 diabetes mellitus with diabetic neuropathy, unspecified long term insulin use status (H)       Medication allergies and current medications reviewed.  See EMR    Review Of Systems  Constitutional:No fevers, chills, or myalgias.  Skin: negative  Eyes: scheduled for June   Ears/Nose/Throat: negative  Respiratory: No shortness of breath,  dyspnea on exertion, cough, or hemoptysis  Cardiovascular: chest pain  Gastrointestinal: diarrhea episodic - colonoscopy  3/2017 - polyps - return in 3 yrs   Genitourinary: negative  Musculoskeletal: back pain, joint pain and joint swelling  Neurologic: neuropathy in feet   Psychiatric: depression on meds and no counselling  PHQ 9 =4   Hematologic/Lymphatic/Immunologic: negative  Endocrine: negative and diabetes       OBJECTIVE:    EXAM:  /75 (BP Location: Right arm, Patient Position: Chair, Cuff Size: Adult Regular)  Pulse 87  Resp 18  Breastfeeding? No  There is no height or weight on file to calculate BMI.  GENERAL APPEARANCE: healthy, alert and no distress  Neck: no carotid bruits, no palpable thyroid   EYES: EOMI,  PERRL  RESP: lungs clear to auscultation - no rales, rhonchi or wheezes  CV: regular rates and rhythm, normal S1 S2, no S3 or S4 and no murmur, click or rub -  Legs: no edema                            Foot Exam:  normal DP and PT pulses, no trophic changes or ulcerative lesions, noted callus at 3rd MT both feet had intact monofilament testing as above . Foot exam done today     ASSESSMENT & PLAN: DiabetesType II:        Glycemic Control:   borderline control     Last A1C was @avldc6p@ 8.4  5/2017  Home monitoring shows poor control  Plan will be seeing Marita  Today   Labs ordered as indicated, see EMR orders.  Dietary changes stressed--lower carb by ratio. Avoid snacks, avoid small frequent meals.  Eye exam by Ophthalmology recommended annually  Information on diabetes given to the patient      Blood pressure:  Goal < 130/80mmHg  At goal? Yes  Planning continue current medication regimen unchanged.  Does not check blood pressures outside clinic visits.       Cholesterol:   Control   good  Goal LDL <  70  LDL was 57 in 12/29/16.    Planning continue current medication regimen unchanged.      Smoking:    At goal? NA    ASA:  At goal? Yes      Other orders:  Eye referral Yes  Microalbumin  creatitine ratio Yes  OK  Albumin in urine low level   Flu shot Yes  Dietary changes stressed  Eye exam by Ophthalmology recommended annually - scheduled in June  Talking with Marita today   Follow up in 3 months.      2. MDD;  Controlled PHQ9 is 4 - remains on meds.     Maryam Clayton MD PhD

## 2017-05-17 NOTE — PATIENT INSTRUCTIONS
Recommendations from today's MTM visit:                                                      1. Continue to work on non-med improvements over the summer.    Next MTM visit: 9/20 at 1pm    To schedule another MTM appointment, please call the clinic directly or you may call the MTM scheduling line at [phone] or toll-free at [phone].     My Clinical Pharmacist's contact information:                                                      It was a pleasure seeing you today!  Please feel free to contact me with any questions or concerns you have.      Marita Jolley PharmD  Medication Therapy Management Provider  Phone:[phone]  Pager: [phone]    You may receive a survey about the MTM services you received.  I would appreciate your feedback to help me serve you better in the future. Please fill it out and return it when you can. Your comments will be anonymous.

## 2017-05-17 NOTE — MR AVS SNAPSHOT
After Visit Summary   5/17/2017    Lindsay Nieves    MRN: [id number]           Patient Information     Date Of Birth          1945        Visit Information        Provider Department      5/17/2017 1:00 PM Marita Jolley RPH [facility] Medication Therapy Management        Care Instructions    Recommendations from today's MTM visit:                                                      1. Continue to work on non-med improvements over the summer.    Next MTM visit: 9/20 at 1pm    To schedule another MTM appointment, please call the clinic directly or you may call the MTM scheduling line at [phone] or toll-free at [phone].     My Clinical Pharmacist's contact information:                                                      It was a pleasure seeing you today!  Please feel free to contact me with any questions or concerns you have.      Marita Jolley PharmD  Medication Therapy Management Provider  Phone:[phone]  Pager: [phone]    You may receive a survey about the MTM services you received.  I would appreciate your feedback to help me serve you better in the future. Please fill it out and return it when you can. Your comments will be anonymous.                Follow-ups after your visit        Your next 10 appointments already scheduled     Amadeo 15, 2017  9:00 AM CDT   RETURN RETINA with Crystal Solis MD   Eye Clinic ([facility])    [place] Zavala [facility]  [address] [facility]  [address] [id number] [phone]            Sep 20, 2017  1:00 PM CDT   (Arrive by 12:45 PM)   SHORT with ЕЛЕНА Samaniego [facility] Medication Therapy Management ([facility])    [address] [facility]  [address] [facility] [address] [phone]              Who to contact     If you have questions or need follow up information about today's clinic visit or your schedule please contact [facility] MEDICATION THERAPY  "MANAGEMENT directly at [phone].  Normal or non-critical lab and imaging results will be communicated to you by MyChart, letter or phone within 4 business days after the clinic has received the results. If you do not hear from us within 7 days, please contact the clinic through [employer] or phone. If you have a critical or abnormal lab result, we will notify you by phone as soon as possible.  Submit refill requests through eSNF or call your pharmacy and they will forward the refill request to us. Please allow 3 business days for your refill to be completed.          Additional Information About Your Visit        [employer] Information     eSNF lets you send messages to your doctor, view your test results, renew your prescriptions, schedule appointments and more. To sign up, go to [email or web address] . Click on \"Log in\" on the left side of the screen, which will take you to the Welcome page. Then click on \"Sign up Now\" on the right side of the page.     You will be asked to enter the access code listed below, as well as some personal information. Please follow the directions to create your username and password.     Your access code is: [email or web address]  Expires: 2017 11:07 AM     Your access code will  in 90 days. If you need help or a new code, please call your [facility] clinic or [phone].        Care EveryWhere ID     This is your Care EveryWhere ID. This could be used by other organizations to access your [facility] medical records  [id number]         Blood Pressure from Last 3 Encounters:   17 120/75   17 118/61   17 136/77    Weight from Last 3 Encounters:   17 195 lb (88.5 kg)   16 215 lb (97.5 kg)   16 215 lb (97.5 kg)              Today, you had the following     No orders found for display         Where to get your medicines      These medications were sent to [facility] [address] [place], [place] - [address] AT [facility] [address]  [address] [facility] [address]     Phone:  [phone]     glipiZIDE 10 MG 24 hr tablet    liraglutide 18 MG/3ML soln          Primary Care Provider Office Phone # Fax #    Maryam Faisal Clayton MD PhD [phone]        PHYSICIANS [address] [facility] [address]  [facility] [address]        Thank you!     Thank you for choosing [facility] MEDICATION THERAPY MANAGEMENT  for your care. Our goal is always to provide you with excellent care. Hearing back from our patients is one way we can continue to improve our services. Please take a few minutes to complete the written survey that you may receive in the mail after your visit with us. Thank you!             Your Updated Medication List - Protect others around you: Learn how to safely use, store and throw away your medicines at www.disposemymeds.org.          This list is accurate as of: 5/17/17  1:29 PM.  Always use your most recent med list.                   Brand Name Dispense Instructions for use    acetaminophen 500 MG tablet    TYLENOL     Take 500-1,000 mg by mouth every 6 hours as needed for mild pain       ADVIL 200 MG tablet   Generic drug:  ibuprofen      Take 200 mg by mouth every 4 hours as needed.       aspirin 81 MG tablet      Take 81 mg by mouth daily       blood glucose monitoring lancets     1 Box    Use to test blood sugar 2-3 times daily or as directed.  Ok to substitute alternative if insurance prefers.       blood glucose monitoring meter device kit     1 kit    Use to test blood sugar 2-3 times daily or as directed.  Ok to substitute alternative if insurance prefers.       blood glucose monitoring test strip    ACCU-CHEK CORTEZ PLUS    1 Box    Use to test blood sugar 2-3 times daily or as directed.  Ok to substitute alternative if insurance prefers.       calcium-vitamin D-vitamin K 500-500-40 MG-UNT-MCG Chew    VIACTIV     Take 1 tablet by mouth daily       FLUoxetine 10 MG capsule    PROzac    180 capsule    Take 2 capsules (20 mg) by  mouth daily       glipiZIDE 10 MG 24 hr tablet    glipiZIDE XL    180 tablet    Take 2 tablets (20 mg) by mouth daily       insulin pen needle 31G X 8 MM     90 each    Use with Victoza injections once daily.       liraglutide 18 MG/3ML soln    VICTOZA PEN    9 mL    Inject 1.8 mg Subcutaneous daily       metFORMIN 1000 MG tablet    GLUCOPHAGE    180 tablet    Take 1 tablet (1,000 mg) by mouth 2 times daily (with meals)       MULTIVITAMIN WOMEN 50+ PO      Take 1 tablet by mouth daily       nitroglycerin 0.4 MG sublingual tablet    NITROSTAT    25 tablet    For chest pain place 1 tablet under the tongue every 5 minutes for 3 doses. If symptoms persist 5 minutes after 1st dose call 911.       pravastatin 40 MG tablet    PRAVACHOL    180 tablet    Take 2 tablets (80 mg) by mouth daily (please dispense as 40mg tabs)       vitamin D 2000 UNITS tablet      Take 1 tablet by mouth daily.

## 2017-05-17 NOTE — PROGRESS NOTES
SUBJECTIVE/OBJECTIVE:                Lindsay Nieves is a 71 year old female coming in for a follow-up visit for Medication Therapy Management.  She was referred to me from Dr. Clayton.     Chief Complaint: Follow up from our visit on 3/15.  Pt is here to discuss diabetes medications.    Tobacco: No tobacco use   Alcohol: Less than 1 beverages / week    Medication Adherence: no issues reported    Diabetes:  Pt currently taking metformin 1000mg BID, glipizide 20mg daily and Victoza. Pt is not experiencing side effects.  SMBG: two times daily.   Ranges (patient reported): 150-180 in the morning, usually less than 170 after dinner.  Patient is not experiencing hypoglycemia  Recent symptoms of high blood sugar? none  Microalbumin is < 30 mg/g. Pt is not taking an ACEi/ARB.  Aspirin: Taking 81mg daily and denies side effects  Diet/Exercise: Pt has been working hard on diet and exercise and intends to continue to incorporate more fruits and vegetables moving into the growing season.      Current labs include:  BP Readings from Last 3 Encounters:   05/17/17 120/75   03/16/17 118/61   02/08/17 136/77     Today's Vitals: There were no vitals taken for this visit.    Most Recent Immunizations   Administered Date(s) Administered     Hepatitis B 09/14/2016     Influenza (High Dose) 3 valent vaccine 09/26/2016     Influenza (IIV3) 10/03/2012     Pneumococcal (PCV 13) 11/03/2014     Pneumococcal (PCV 7) 04/12/2006     Pneumococcal 23 valent 02/25/2013     TDAP Vaccine (Boostrix) 02/25/2013     Tetanus 09/19/2005     Zoster vaccine, live 05/18/2009     ASSESSMENT:              Current medications were reviewed today as discussed above.      Medication Adherence: no issues identified    Diabetes: Improved. Patient is not meeting A1c goal of < 7% but is making real improvement. Self monitoring of blood glucose is not at goal of fasting  mg/dL but is at post prandial goal < 180 mg/dL. Pt may benefit from new med initiation to  drive blood sugars down quicker, but would prefer to continue to work on diet and weight loss as well as increased exercise. I asked if she needed resources for this and she feels it's not necessary at this time.     PLAN:                  1. Pt to continue to work on diet and exercise to lower blood sugars.    2. Pt to contact me if she is interested in diabetes ed referral or med interventions.    I spent 30 minutes with this patient today.  All changes were made via collaborative practice agreement with Maryam Clayton. A copy of the visit note was provided to the patient's primary care provider.     Will follow up in 3 months, sooner if needed.    The patient was given a summary of these recommendations as an after visit summary.    Marita Jolley PharmD  Medication Therapy Management Provider  Phone:[phone]  Pager: [phone]

## 2017-05-17 NOTE — PATIENT INSTRUCTIONS
[facility] Medication Refill Request Information:  * Please contact your pharmacy regarding ANY request for medication refills.  ** [facility] Prescription Fax = [phone]  * Please allow 3 business days for routine medication refills.  * Please allow 5 business days for controlled substance medication refills.     [facility] Test Result notification information:  *You will be notified with in 7-10 days of your appointment day regarding the results of your test.  If you are on MyChart you will be notified as soon as the provider has reviewed the results and signed off on them.    [facility] [phone]

## 2017-05-17 NOTE — NURSING NOTE
Chief Complaint   Patient presents with     Diabetes     Here to follow up diabetes     Results     Also here discuss further on heart test results     Refill Request     Also here for 2 medication refills; Victoza and glipizide     Jered Fonseca CMA at 12:03 PM on 5/17/2017

## 2017-05-17 NOTE — MR AVS SNAPSHOT
After Visit Summary   5/17/2017    Lindsay Nieves    MRN: [id number]           Patient Information     Date Of Birth          1945        Visit Information        Provider Department      5/17/2017 12:00 PM Maryam Clayton MD PhD [facility]        Today's Diagnoses     Type 2 diabetes mellitus with diabetic neuropathy, unspecified long term insulin use status (H)          Care Instructions    [facility] Center Medication Refill Request Information:  * Please contact your pharmacy regarding ANY request for medication refills.  ** [facility] Prescription Fax = [phone]  * Please allow 3 business days for routine medication refills.  * Please allow 5 business days for controlled substance medication refills.     Primary Care Center Test Result notification information:  *You will be notified with in 7-10 days of your appointment day regarding the results of your test.  If you are on MyChart you will be notified as soon as the provider has reviewed the results and signed off on them.    [facility] [phone]           Follow-ups after your visit        Follow-up notes from your care team     Return in about 3 months (around 8/17/2017) for Physical Exam.      Your next 10 appointments already scheduled     May 17, 2017  1:00 PM CDT   (Arrive by 12:45 PM)   [facility] ([facility])    [address] [address] [phone]            Amadeo 15, 2017  9:00 AM CDT   RETURN RETINA with Crystal Solis MD   Eye Clinic ([facility])    [place] Zavala [address] [id number] [phone]              Who to contact     Please call your clinic at [phone] to:    Ask questions about your health    Make or cancel appointments    Discuss your medicines    Learn about your test  results    Speak to your doctor   If you have compliments or concerns about an experience at your clinic, or if you wish to file a complaint, please contact [facility] Patient Relations at [phone] or email us at [email or web address]         Additional Information About Your Visit        [employer] Information     [employer] is an electronic gateway that provides easy, online access to your medical records. With SocialM[employer]a, you can request a clinic appointment, read your test results, renew a prescription or communicate with your care team.     To sign up for [employer] visit the website at [email or web address]   You will be asked to enter the access code listed below, as well as some personal information. Please follow the directions to create your username and password.     Your access code is: [email or web address]  Expires: 2017 11:07 AM     Your access code will  in 90 days. If you need help or a new code, please contact your [facility] Clinic or call [phone] for assistance.        Care EveryWhere ID     This is your Care EveryWhere ID. This could be used by other organizations to access your [facility] medical records  [id number]        Your Vitals Were     Pulse Respirations Breastfeeding?             87 18 No          Blood Pressure from Last 3 Encounters:   17 120/75   17 118/61   17 136/77    Weight from Last 3 Encounters:   17 88.5 kg (195 lb)   16 97.5 kg (215 lb)   16 97.5 kg (215 lb)              Today, you had the following     No orders found for display         Where to get your medicines      These medications were sent to [facility] [address] [facility] [address]  [address] [facility] [address]  [address] [address]     Phone:  [phone]     glipiZIDE 10 MG 24 hr tablet    liraglutide 18 MG/3ML soln          Primary Care Provider Office Phone # Fax #    Maryam Malone  MD Forrest PhD [phone]        PHYSICIANS [address] [facility] [address]  [facility] [address]        Thank you!     Thank you for choosing [facility] PRIMARY CARE CLINIC  for your care. Our goal is always to provide you with excellent care. Hearing back from our patients is one way we can continue to improve our services. Please take a few minutes to complete the written survey that you may receive in the mail after your visit with us. Thank you!             Your Updated Medication List - Protect others around you: Learn how to safely use, store and throw away your medicines at www.disposemymeds.org.          This list is accurate as of: 5/17/17 12:38 PM.  Always use your most recent med list.                   Brand Name Dispense Instructions for use    acetaminophen 500 MG tablet    TYLENOL     Take 500-1,000 mg by mouth every 6 hours as needed for mild pain       ADVIL 200 MG tablet   Generic drug:  ibuprofen      Take 200 mg by mouth every 4 hours as needed.       aspirin 81 MG tablet      Take 81 mg by mouth daily       blood glucose monitoring lancets     1 Box    Use to test blood sugar 2-3 times daily or as directed.  Ok to substitute alternative if insurance prefers.       blood glucose monitoring meter device kit     1 kit    Use to test blood sugar 2-3 times daily or as directed.  Ok to substitute alternative if insurance prefers.       blood glucose monitoring test strip    ACCU-CHEK CORTEZ PLUS    1 Box    Use to test blood sugar 2-3 times daily or as directed.  Ok to substitute alternative if insurance prefers.       calcium-vitamin D-vitamin K 500-500-40 MG-UNT-MCG Chew    VIACTIV     Take 1 tablet by mouth daily       FLUoxetine 10 MG capsule    PROzac    180 capsule    Take 2 capsules (20 mg) by mouth daily       glipiZIDE 10 MG 24 hr tablet    glipiZIDE XL    180 tablet    Take 2 tablets (20 mg) by mouth daily       insulin pen needle 31G X 8 MM     90 each    Use with Victoza injections  once daily.       liraglutide 18 MG/3ML soln    VICTOZA PEN    9 mL    Inject 1.8 mg Subcutaneous daily       metFORMIN 1000 MG tablet    GLUCOPHAGE    180 tablet    Take 1 tablet (1,000 mg) by mouth 2 times daily (with meals)       MULTIVITAMIN WOMEN 50+ PO      Take 1 tablet by mouth daily       nitroglycerin 0.4 MG sublingual tablet    NITROSTAT    25 tablet    For chest pain place 1 tablet under the tongue every 5 minutes for 3 doses. If symptoms persist 5 minutes after 1st dose call 911.       pravastatin 40 MG tablet    PRAVACHOL    180 tablet    Take 2 tablets (80 mg) by mouth daily (please dispense as 40mg tabs)       vitamin D 2000 UNITS tablet      Take 1 tablet by mouth daily.

## 2017-05-18 ASSESSMENT — PATIENT HEALTH QUESTIONNAIRE - PHQ9: SUM OF ALL RESPONSES TO PHQ QUESTIONS 1-9: 4

## 2017-07-08 ENCOUNTER — HEALTH MAINTENANCE LETTER (OUTPATIENT)
Age: 72
End: 2017-07-08

## 2017-08-16 ENCOUNTER — OFFICE VISIT (OUTPATIENT)
Dept: OPHTHALMOLOGY | Facility: CLINIC | Age: 72
End: 2017-08-16
Attending: OPHTHALMOLOGY
Payer: MEDICARE

## 2017-08-16 DIAGNOSIS — E11.9 TYPE 2 DIABETES MELLITUS WITHOUT COMPLICATION, WITH LONG-TERM CURRENT USE OF INSULIN (H): Primary | ICD-10-CM

## 2017-08-16 DIAGNOSIS — Z79.4 TYPE 2 DIABETES MELLITUS WITHOUT COMPLICATION, WITH LONG-TERM CURRENT USE OF INSULIN (H): Primary | ICD-10-CM

## 2017-08-16 PROCEDURE — 99212 OFFICE O/P EST SF 10 MIN: CPT | Mod: ZF

## 2017-08-16 ASSESSMENT — EXTERNAL EXAM - LEFT EYE: OS_EXAM: NORMAL

## 2017-08-16 ASSESSMENT — TONOMETRY
IOP_METHOD: TONOPEN
OD_IOP_MMHG: 15
OS_IOP_MMHG: 13

## 2017-08-16 ASSESSMENT — SLIT LAMP EXAM - LIDS
COMMENTS: NORMAL
COMMENTS: NORMAL

## 2017-08-16 ASSESSMENT — VISUAL ACUITY
METHOD: SNELLEN - LINEAR
OS_CC: 20/20
OD_CC: 20/20
CORRECTION_TYPE: GLASSES

## 2017-08-16 ASSESSMENT — REFRACTION_WEARINGRX
SPECS_TYPE: TRIFOCAL
OD_SPHERE: +2.50
OS_SPHERE: +1.25
OS_ADD: +2.75
OS_CYLINDER: SPHERE
OD_AXIS: 023
OD_CYLINDER: +0.50
OD_ADD: +2.75

## 2017-08-16 ASSESSMENT — CUP TO DISC RATIO
OS_RATIO: 0.2
OD_RATIO: 0.2

## 2017-08-16 ASSESSMENT — CONF VISUAL FIELD
OD_NORMAL: 1
OS_NORMAL: 1

## 2017-08-16 ASSESSMENT — EXTERNAL EXAM - RIGHT EYE: OD_EXAM: NORMAL

## 2017-08-16 NOTE — LETTER
8/16/2017       RE: Lindsay Nieevs  [address] [place] [address]     Dear Colleague,    Thank you for referring your patient, Lindsay Nieves, to the EYE CLINIC at [facility]. Please see a copy of my visit note below.    CC: Diabetes mellitus exam    HPI: Lindsay Nieves is a  71 year old year-old patient with history of Diabetes mellitus since 2004.  Reports no changes in vision, no flashes and floaters.  Last A1C was 7.8, (may, 17)   Patient taken glipizide.  No changes in vision no flashes and floaters   Assessment & Plan:  1. Diabetes mellitus II  No Diabetic retinopathy  Blood pressure (<120/80) and blood glucose (HbA1c <7.0) control discussed with patient. Patient advised that failure to adequately control each may lead to vision loss. The patient expressed understanding.    2. Mild cataracts both eyes. Observe    3. Posterior vitreous detachment (PVD)   Retina detachment and endophthalmitis precautions were discussed with the patient and was asked to return if any of the those occur    Follow up  1 yr  ~~~~~~~~~~~~~~~~~~~~~~~~~~~~~~~~~~   Complete documentation of historical and exam elements from today's encounter can be found in the full encounter summary report (not reduplicated in this progress note).  I personally obtained the chief complaint(s) and history of present illness.  I confirmed and edited as necessary the review of systems, past medical/surgical history, family history, social history, and examination findings as documented by others; and I examined the patient myself.  I personally reviewed the relevant tests, images, and reports as documented above.  I formulated and edited as necessary the assessment and plan and discussed the findings and management plan with the patient and family    Crystal Solis MD  .  Retina Service   Department of Ophthalmology and Visual Neurosciences   [facility]  Phone: [phone]   Fax: [phone]

## 2017-08-16 NOTE — NURSING NOTE
Chief Complaints and History of Present Illnesses   Patient presents with     Follow Up For     Diabetes mellitus II     HPI    Affected eye(s):  Both   Symptoms:        Duration:  1 year   Frequency:  Constant       Do you have eye pain now?:  No      Comments:  Pt. States that she is doing well.  No change in VA BE.  No c/o comfort BE.  Serina GALLARDO 10:13 AM August 16, 2017

## 2017-08-16 NOTE — MR AVS SNAPSHOT
After Visit Summary   8/16/2017    Lindsay Nieves    MRN: [id number]           Patient Information     Date Of Birth          1945        Visit Information        Provider Department      8/16/2017 9:45 AM [facility]        Today's Diagnoses     Type 2 diabetes mellitus without complication, with long-term current use of insulin (H)    -  1       Follow-ups after your visit        Follow-up notes from your care team     Return in about 1 year (around 8/16/2018).      Your next 10 appointments already scheduled     Aug 30, 2017 12:00 PM CDT   (Arrive by 11:45 AM)   SHORT with Marita Jolley RPH   [facility] ([facility])    [address] [place] [id number] [phone]            Sep 20, 2017  2:00 PM CDT   (Arrive by 1:45 PM)   PHYSICAL with Maryam Clayton MD PhD   [facility] ([facility])    [address] [place] [id number] [phone]              Who to contact     Please call your clinic at [phone] to:    Ask questions about your health    Make or cancel appointments    Discuss your medicines    Learn about your test results    Speak to your doctor   If you have compliments or concerns about an experience at your clinic, or if you wish to file a complaint, please contact [facility] Patient Relations at [phone] or email us at [email or web address]         Additional Information About Your Visit        MyChart Information     [employer] is an electronic gateway that provides easy, online access to your medical records. With [employer], you can request a clinic appointment, read your test results, renew a prescription or communicate with your care team.     To sign up for [employer] visit the website at [email or web address]   You will be asked to enter the  access code listed below, as well as some personal information. Please follow the directions to create your username and password.     Your access code is: [email or web address]  Expires: 10/31/2017  6:31 AM     Your access code will  in 90 days. If you need help or a new code, please contact your [facility] or call [phone] for assistance.        Care EveryWhere ID     This is your Care EveryWhere ID. This could be used by other organizations to access your [place] medical records  [id number]         Blood Pressure from Last 3 Encounters:   17 120/75   17 118/61   17 136/77    Weight from Last 3 Encounters:   17 88.5 kg (195 lb)   16 97.5 kg (215 lb)   16 97.5 kg (215 lb)              Today, you had the following     No orders found for display       Primary Care Provider Office Phone # Fax #    Maryam Clayton MD PhD [phone]       [address] [place] [address]        Equal Access to Services     [facility] : Hadii adrián ku hadasho Solele, waaxda luqadaha, qaybta kaalmada adewally, cullen trujillo . So [facility] [phone].    ATENCIÓN: Si habla español, tiene a resendiz disposición servicios gratuitos de asistencia lingüística. [facility] [phone].    We comply with applicable federal civil rights laws and [place] laws. We do not discriminate on the basis of race, color, national origin, age, disability sex, sexual orientation or gender identity.            Thank you!     Thank you for choosing [facility]  for your care. Our goal is always to provide you with excellent care. Hearing back from our patients is one way we can continue to improve our services. Please take a few minutes to complete the written survey that you may receive in the mail after your visit with us. Thank you!             Your Updated Medication List - Protect others around you: Learn how to safely use, store and  throw away your medicines at www.disposemymeds.org.          This list is accurate as of: 8/16/17 11:27 AM.  Always use your most recent med list.                   Brand Name Dispense Instructions for use Diagnosis    acetaminophen 500 MG tablet    TYLENOL     Take 500-1,000 mg by mouth every 6 hours as needed for mild pain        ADVIL 200 MG tablet   Generic drug:  ibuprofen      Take 200 mg by mouth every 4 hours as needed.        aspirin 81 MG tablet      Take 81 mg by mouth daily        blood glucose monitoring lancets     1 Box    Use to test blood sugar 2-3 times daily or as directed.  Ok to substitute alternative if insurance prefers.    Type 2 diabetes mellitus with diabetic neuropathy, without long-term current use of insulin (H)       blood glucose monitoring meter device kit     1 kit    Use to test blood sugar 2-3 times daily or as directed.  Ok to substitute alternative if insurance prefers.        blood glucose monitoring test strip    ACCU-CHEK CORTEZ PLUS    1 Box    Use to test blood sugar 2-3 times daily or as directed.  Ok to substitute alternative if insurance prefers.    Type 2 diabetes mellitus with diabetic neuropathy, without long-term current use of insulin (H)       calcium-vitamin D-vitamin K 500-500-40 MG-UNT-MCG Chew    VIACTIV     Take 1 tablet by mouth daily        FLUoxetine 10 MG capsule    PROzac    180 capsule    Take 2 capsules (20 mg) by mouth daily    Major depressive disorder, recurrent episode, mild (H)       glipiZIDE 10 MG 24 hr tablet    glipiZIDE XL    180 tablet    Take 2 tablets (20 mg) by mouth daily    Type 2 diabetes mellitus with diabetic neuropathy, unspecified long term insulin use status (H)       insulin pen needle 31G X 8 MM     90 each    Use with Victoza injections once daily.    Type 2 diabetes mellitus with other specified complication (H)       liraglutide 18 MG/3ML soln    VICTOZA PEN    9 mL    Inject 1.8 mg Subcutaneous daily    Type 2 diabetes mellitus  with diabetic neuropathy, unspecified long term insulin use status (H)       metFORMIN 1000 MG tablet    GLUCOPHAGE    180 tablet    Take 1 tablet (1,000 mg) by mouth 2 times daily (with meals)    Type 2 diabetes mellitus with diabetic polyneuropathy (H)       MULTIVITAMIN WOMEN 50+ PO      Take 1 tablet by mouth daily        nitroGLYcerin 0.4 MG sublingual tablet    NITROSTAT    25 tablet    For chest pain place 1 tablet under the tongue every 5 minutes for 3 doses. If symptoms persist 5 minutes after 1st dose call 911.    Chest tightness or pressure       pravastatin 40 MG tablet    PRAVACHOL    180 tablet    Take 2 tablets (80 mg) by mouth daily (please dispense as 40mg tabs)    Hyperlipidemia LDL goal <100       vitamin D 2000 UNITS tablet      Take 1 tablet by mouth daily.

## 2017-08-16 NOTE — PROGRESS NOTES
CC: Diabetes mellitus exam    HPI: Lindsay Nieves is a  71 year old year-old patient with history of Diabetes mellitus since 2004.  Reports no changes in vision, no flashes and floaters.  Last A1C was 7.8, (may, 17)   Patient taken glipizide.  No changes in vision no flashes and floaters   Assessment & Plan:  1. Diabetes mellitus II  No Diabetic retinopathy  Blood pressure (<120/80) and blood glucose (HbA1c <7.0) control discussed with patient. Patient advised that failure to adequately control each may lead to vision loss. The patient expressed understanding.    2. Mild cataracts both eyes. Observe    3. Posterior vitreous detachment (PVD)   Retina detachment and endophthalmitis precautions were discussed with the patient and was asked to return if any of the those occur    Follow up  1 yr  ~~~~~~~~~~~~~~~~~~~~~~~~~~~~~~~~~~   Complete documentation of historical and exam elements from today's encounter can be found in the full encounter summary report (not reduplicated in this progress note).  I personally obtained the chief complaint(s) and history of present illness.  I confirmed and edited as necessary the review of systems, past medical/surgical history, family history, social history, and examination findings as documented by others; and I examined the patient myself.  I personally reviewed the relevant tests, images, and reports as documented above.  I formulated and edited as necessary the assessment and plan and discussed the findings and management plan with the patient and family    Crystal Solis MD  .  Retina Service   Department of Ophthalmology and Visual Neurosciences   [facility]  Phone: [phone]   Fax: [phone]

## 2017-09-25 DIAGNOSIS — E11.40 TYPE 2 DIABETES MELLITUS WITH DIABETIC NEUROPATHY, UNSPECIFIED LONG TERM INSULIN USE STATUS: ICD-10-CM

## 2017-09-25 RX ORDER — LIRAGLUTIDE 6 MG/ML
1.8 INJECTION SUBCUTANEOUS DAILY
Qty: 27 ML | Refills: 1 | Status: SHIPPED | OUTPATIENT
Start: 2017-09-25 | End: 2017-11-29

## 2017-09-25 NOTE — TELEPHONE ENCOUNTER
victoza  Last Written Prescription Date:  5/17/17  Last Fill Quantity: 9 ml,   # refills: 3  Last Office Visit : 5/17/17  Future Office visit:  11/1/17  Creatinine   Date Value Ref Range Status   02/08/2017 0.64 0.52 - 1.04 mg/dL Final     Component Value Date    A1C 8.4 (H) 05/12/2017     Lab Results   Component Value Date    MICROL <5 05/12/2017

## 2017-10-09 DIAGNOSIS — E11.9 DIABETES MELLITUS, TYPE 2 (H): Primary | ICD-10-CM

## 2017-10-09 NOTE — TELEPHONE ENCOUNTER
metformin  Last Written Prescription Date:  9/14/16  Last Fill Quantity: 180,   # refills: 4  Last Office Visit : 5/17/17  Future Office visit:  11/1/17  Creatinine   Date Value Ref Range Status   02/08/2017 0.64 0.52 - 1.04 mg/dL Final   ]  Component Value Date    A1C 8.4 05/12/2017     Lab Results   Component Value Date    MICROL <5 05/12/2017

## 2017-11-29 ENCOUNTER — OFFICE VISIT (OUTPATIENT)
Dept: FAMILY MEDICINE | Facility: CLINIC | Age: 72
End: 2017-11-29

## 2017-11-29 ENCOUNTER — OFFICE VISIT (OUTPATIENT)
Dept: PHARMACY | Facility: CLINIC | Age: 72
End: 2017-11-29
Payer: COMMERCIAL

## 2017-11-29 VITALS
OXYGEN SATURATION: 98 % | TEMPERATURE: 98 F | SYSTOLIC BLOOD PRESSURE: 123 MMHG | HEART RATE: 71 BPM | RESPIRATION RATE: 18 BRPM | DIASTOLIC BLOOD PRESSURE: 67 MMHG

## 2017-11-29 DIAGNOSIS — Z00.00 ROUTINE GENERAL MEDICAL EXAMINATION AT A HEALTH CARE FACILITY: Primary | ICD-10-CM

## 2017-11-29 DIAGNOSIS — Z86.0100 HISTORY OF COLONIC POLYPS: ICD-10-CM

## 2017-11-29 DIAGNOSIS — Z79.4 TYPE 2 DIABETES MELLITUS WITH OTHER SPECIFIED COMPLICATION, WITH LONG-TERM CURRENT USE OF INSULIN (H): ICD-10-CM

## 2017-11-29 DIAGNOSIS — E78.5 HYPERLIPIDEMIA LDL GOAL <100: ICD-10-CM

## 2017-11-29 DIAGNOSIS — E11.69 TYPE 2 DIABETES MELLITUS WITH OTHER SPECIFIED COMPLICATION, WITH LONG-TERM CURRENT USE OF INSULIN (H): ICD-10-CM

## 2017-11-29 DIAGNOSIS — M85.89 OSTEOPENIA OF MULTIPLE SITES: ICD-10-CM

## 2017-11-29 DIAGNOSIS — Z12.31 VISIT FOR SCREENING MAMMOGRAM: ICD-10-CM

## 2017-11-29 DIAGNOSIS — E11.40 TYPE 2 DIABETES MELLITUS WITH DIABETIC NEUROPATHY, WITHOUT LONG-TERM CURRENT USE OF INSULIN (H): Primary | ICD-10-CM

## 2017-11-29 DIAGNOSIS — F33.0 MAJOR DEPRESSIVE DISORDER, RECURRENT EPISODE, MILD (H): ICD-10-CM

## 2017-11-29 DIAGNOSIS — Z91.81 AT RISK FOR FALLING: ICD-10-CM

## 2017-11-29 DIAGNOSIS — E11.8 TYPE 2 DIABETES MELLITUS WITH COMPLICATION, WITHOUT LONG-TERM CURRENT USE OF INSULIN (H): ICD-10-CM

## 2017-11-29 PROBLEM — E11.9 TYPE 2 DIABETES MELLITUS WITHOUT COMPLICATION, WITH LONG-TERM CURRENT USE OF INSULIN (H): Status: RESOLVED | Noted: 2017-08-16 | Resolved: 2017-11-29

## 2017-11-29 PROCEDURE — 99607 MTMS BY PHARM ADDL 15 MIN: CPT | Performed by: PHARMACIST

## 2017-11-29 PROCEDURE — 99606 MTMS BY PHARM EST 15 MIN: CPT | Performed by: PHARMACIST

## 2017-11-29 RX ORDER — GLIPIZIDE 10 MG/1
20 TABLET, FILM COATED, EXTENDED RELEASE ORAL DAILY
Qty: 180 TABLET | Refills: 3 | Status: SHIPPED | OUTPATIENT
Start: 2017-11-29 | End: 2019-01-09

## 2017-11-29 RX ORDER — PRAVASTATIN SODIUM 40 MG
80 TABLET ORAL DAILY
Qty: 180 TABLET | Refills: 3 | Status: SHIPPED | OUTPATIENT
Start: 2017-11-29 | End: 2018-12-03

## 2017-11-29 RX ORDER — LIRAGLUTIDE 6 MG/ML
1.8 INJECTION SUBCUTANEOUS DAILY
Qty: 27 ML | Refills: 3 | Status: SHIPPED | OUTPATIENT
Start: 2017-11-29 | End: 2017-12-06 | Stop reason: ALTCHOICE

## 2017-11-29 ASSESSMENT — PATIENT HEALTH QUESTIONNAIRE - PHQ9: SUM OF ALL RESPONSES TO PHQ QUESTIONS 1-9: 3

## 2017-11-29 ASSESSMENT — PAIN SCALES - GENERAL: PAINLEVEL: MILD PAIN (2)

## 2017-11-29 NOTE — PROGRESS NOTES
Rooming Note    Health Maintenance  Health Maintenance Due   Topic Date Due     DEPRESSION ACTION PLAN Q1 YR  11/19/1965     FALL RISK ASSESSMENT  11/19/2012     PAP Q1 YR DIAGNOSTIC  08/19/2016     PHQ-9 Q3 MONTHS  08/17/2017     A1C Q6 MO  11/12/2017     LIPID MONITORING Q1 YEAR  12/29/2017     MAMMO Q1 YR  12/29/2017   Health maintenance items discussed and ordered/forwarded to PCP.     Fall Risk  FALL RISK ASSESSMENT 11/29/2017   Fallen 2 or more times in the past year? No   Any fall with injury in the past year? No       REASON for VISIT annual exam and diabetic check    HPI   Lindsay Nieves is a 72 year old female who is here for a preventive examination. She is [id number] PM female, with no vaginal bleeding, itching or burning.  Pap's have been normal  - last one was 2013 and previous was 2011 and both normal.      Mammogram is due in 12/2017  DXA was 11/2015 and mild osteopenia.  Vit D3 2000IU/day and milk 8-10oz/day, yogurt few times/wk and cottage cheese few times/wk.  Takes a multivitamin, takes viactive daily when remembers.    Exercise: walking 2-3x/wk for 20 minutes    Diabetes: checking sugars every 3rd day - before breakfast  170-180  2 hr after supper 130-150.  She is on metformin and victoza and glipizide - no recent visit with pharmacist.   Eye exam was 8/2017 - no retina change - has cataracts  Feet - no sores, has tingling both feet  ASA +  Dental appt was 5/2017 and again in 12/2017   microalb in 5/2017 and normal  Had flu shot in 10/2017 at [facility]    Colonoscopy was 3/2017 - found 6 polyps and repeat in 3 years    Hx of high cholesterol:  On pravastatin - in the evening.       Questions about Zostrvax - new shingles vaccine available - Shingrix.      Hx of chest tightness - seems stress related. Had an adenosine test in 2/2017 and was Normal myocardial SPECT study with a summed stress score of 0. A  summed stress score of 0 is associated with an annual event rate of  0.5% for myocardial  infarction and cardiac death, respectively  (Lydia. Circulation 1998;98:535-43).        Past Medical History:   Diagnosis Date     Arthritis      Colon polyps 2010     Diabetes mellitus      Hypercholesterolemia      MDD (major depressive disorder)      Scoliosis      Spinal stenosis      Past Surgical History:   Procedure Laterality Date     COLONOSCOPY  2010    polyps - due in 3 yrs     COLONOSCOPY  12/5/2013    Procedure: COMBINED COLONOSCOPY, SINGLE BIOPSY/POLYPECTOMY BY BIOPSY;;  Surgeon: Nesha Baca MD;  Location: [facility]     COLONOSCOPY N/A 3/16/2017    Procedure: COMBINED COLONOSCOPY, SINGLE OR MULTIPLE BIOPSY/POLYPECTOMY BY BIOPSY;  Surgeon: Nesha Baca MD; 6 polyps - due in 3 yrs  Location: [facility]     INJECT STEROID (LOCATION) Left 5/12/2016    Procedure: INJECT STEROID (LOCATION);  Surgeon: Jade Antonio MD;  Location: [facility]     RELEASE TRIGGER FINGER Left 5/12/2016    Procedure: RELEASE TRIGGER FINGER;  Surgeon: Jade Antonio MD;  Location: [facility] OR     tonsils and adneoids             Current Outpatient Prescriptions   Medication Sig Dispense Refill     metFORMIN (GLUCOPHAGE) 1000 MG tablet Take 1 tablet (1,000 mg) by mouth 2 times daily (with meals) 180 tablet 3     liraglutide (VICTOZA PEN) 18 MG/3ML soln Inject 1.8 mg Subcutaneous daily 27 mL 3     glipiZIDE (GLIPIZIDE XL) 10 MG 24 hr tablet Take 2 tablets (20 mg) by mouth daily 180 tablet 3     pravastatin (PRAVACHOL) 40 MG tablet Take 2 tablets (80 mg) by mouth daily (please dispense as 40mg tabs) 180 tablet 3     insulin pen needle 31G X 8 MM Use with Victoza injections once daily. 90 each 3     FLUoxetine (PROZAC) 10 MG capsule Take 2 capsules (20 mg) by mouth daily 180 capsule 3     blood glucose monitoring (ACCU-CHEK CORTEZ PLUS) test strip Use to test blood sugar 2-3 times daily or as directed.  Ok to substitute alternative if insurance prefers. 1 Box prn     blood glucose monitoring  (SOFTCLIX) lancets Use to test blood sugar 2-3 times daily or as directed.  Ok to substitute alternative if insurance prefers. 1 Box prn     nitroglycerin (NITROSTAT) 0.4 MG sublingual tablet For chest pain place 1 tablet under the tongue every 5 minutes for 3 doses. If symptoms persist 5 minutes after 1st dose call 911. 25 tablet 0     acetaminophen (TYLENOL) 500 MG tablet Take 500-1,000 mg by mouth every 6 hours as needed for mild pain       Multiple Vitamins-Minerals (MULTIVITAMIN WOMEN 50+ PO) Take 1 tablet by mouth daily       blood glucose monitoring (ACCU-CHEK CORTEZ PLUS) meter device kit Use to test blood sugar 2-3 times daily or as directed.  Ok to substitute alternative if insurance prefers. 1 kit 0     aspirin 81 MG tablet Take 81 mg by mouth daily       Cholecalciferol (VITAMIN D) 2000 UNITS tablet Take 1 tablet by mouth daily.        calcium-vitamin D-vitamin K (VIACTIV) 500-500-40 MG-UNT-MCG CHEW Take 1 tablet by mouth daily       ibuprofen (ADVIL) 200 MG tablet Take 200 mg by mouth every 4 hours as needed.       [DISCONTINUED] metFORMIN (GLUCOPHAGE) 1000 MG tablet Take 1 tablet (1,000 mg) by mouth 2 times daily (with meals) 180 tablet 0     [DISCONTINUED] liraglutide (VICTOZA PEN) 18 MG/3ML soln Inject 1.8 mg Subcutaneous daily 27 mL 1     [DISCONTINUED] glipiZIDE (GLIPIZIDE XL) 10 MG 24 hr tablet Take 2 tablets (20 mg) by mouth daily 180 tablet 3     [DISCONTINUED] pravastatin (PRAVACHOL) 40 MG tablet Take 2 tablets (80 mg) by mouth daily (please dispense as 40mg tabs) 180 tablet 3    No Known Allergies      Social History   Substance Use Topics     Smoking status: Former Smoker     Quit date: 9/20/2000     Smokeless tobacco: Never Used     Alcohol use 0.0 oz/week     0 Standard drinks or equivalent per week      Comment:  minimal - 1 drink/wk or 1 drink/mo     Social History     Social History     Marital status:      Spouse name: N/A     Number of children: N/A     Years of education: N/A      Occupational History     retired      Social History Main Topics     Smoking status: Former Smoker     Quit date: 9/20/2000     Smokeless tobacco: Never Used     Alcohol use 0.0 oz/week     0 Standard drinks or equivalent per week      Comment:  minimal - 1 drink/wk or 1 drink/mo     Drug use: No     Sexual activity: Not Currently     Partners: Male     Other Topics Concern     Caffeine Concern Yes     3-4 cups/day     Sleep Concern No     Stress Concern Yes     's health     Weight Concern Yes     Special Diet Yes     diabetic diet     Back Care Yes     exercise     Exercise Yes     walking 2-3x/wk 20 min     Seat Belt Yes     Social History Narrative    ,  in poor health.  Son in [place] - 2 grandchildren.  Lives in a [facility] [place].      Most Recent Immunizations   Administered Date(s) Administered     HepB 09/14/2016     Influenza (High Dose) 3 valent vaccine 10/04/2017     Influenza (IIV3) PF 10/03/2012     Pneumococcal (PCV 13) 11/03/2014     Pneumococcal (PCV 7) 04/12/2006     Pneumococcal 23 valent 02/25/2013     TDAP Vaccine (Boostrix) 02/25/2013     Tetanus 09/19/2005     Zoster vaccine, live 05/18/2009         Family History   Problem Relation Age of Onset     DIABETES Mother      CEREBROVASCULAR DISEASE Mother      Blood Disease Mother       leiden blood disorder     Cardiovascular Father      CANCER Maternal Grandmother      basal cell     Blood Disease Brother      leiden disorder     CANCER Brother      skin     Prostate Cancer Brother      CANCER Brother      skin basal cell          Review Of Systems  General:negative  Skin: negative  Eyes: negative  Ears/Nose/Throat: negative  Respiratory: No shortness of breath, dyspnea on exertion, cough, or hemoptysis  Cardiovascular: chest pain  Gastrointestinal: has had diarrhea the last week from stress and perianal area is irritated and some intermittent bleeding.  Using thick ointment - zinc oxide - refusing pelvic    Genitourinary: negative  Musculoskeletal: joint pain  Neurologic:  numbness or tingling of feet  Psychiatric: depression  Hematologic/Lymphatic/Immunologic: negative  Endocrine:  diabetes    OBJECTIVE:  /67  Pulse 71  Temp 98  F (36.7  C) (Oral)  Resp 18  SpO2 98%  Breastfeeding? No  Gen:  older woman in NAD  HEENT: PERRL fundi narrow arterioles - cloudy pupils   Ears clear with good light reflex.  OP MMM no lesions.  No drainage  Neck  Supple.  Thyroid not palpable  No carotid bruits.  No LAD  CVS exam: S1, S2 normal, no murmur, click, rub or gallop, regular rate and rhythm, chest is clear without rales or wheezing, no pedal edema, no JVD, no hepatosplenomegaly.  Abd Soft ND NT  BS present  No masses or HSM  Ext   Good pulses. No edema  Musculoskeletal: spine is straight, extremities full ROM, no deformity  BREAST:  normal without suspicious masses, skin changes or axillary nodes, symmetric fibrous changes in both upper outer quadrants   Pelvic exam:deferred  Rectal exam: deferred  Neuro: Neurological exam reveals normal without focal findings, mental status, speech normal, alert and oriented x iii, JOS, cranial nerves 2-12 intact, muscle tone and strength normal and symmetric, reflexes normal and symmetric  Foot exam: 2/4 d peds 1/4 post tib pulses, no sores, bilateral callus MT3 . Monofilament testing was slightly diminished on the left heel otherwise intact       ASSESSMENT:Elderly PM female with hx of DM, hypercholesterolemia, MDD, who comes in today for preventative exam and diabetic check    (Z00.00) Routine general medical examination at a health care facility  (primary encounter diagnosis)  Comment: immunizations UTD; mammogram due; colonoscopy was 3/2017;  BP is good  Plan: see orders        (E78.5) Hyperlipidemia LDL goal <100  Comment: lipid is due - not fasting - will return for lab  Plan: Lipid panel reflex to direct LDL Fasting,         pravastatin (PRAVACHOL) 40 MG tablet             (F33.0) Major depressive disorder, recurrent episode, mild (H)  Comment: on meds - PHQ 9=3 - stable  Plan: continue meds    (Z86.010) History of colonic polyps  Comment: had colonoscopy in 3/2017 and had 6 polyps  Plan: due again in 3 yrs    (E11.69,  Z79.4) Type 2 diabetes mellitus with other specified complication, with long-term current use of insulin (H)  Comment: monitoring in AM with BS values around 170 suggest poor control  She will meet with Marita today.  Eye exam was 8/2017; on ASA, microalb due; will get A1C; foot exam was done   Plan: insulin pen needle 31G X 8 MM, MED THERAPY         MANAGE REFERRAL, Basic metabolic panel, A1C,         Refilled meds    (M85.89) Osteopenia of multiple sites  Comment: last DXA was 10/2015 and had mild osteopenia  Plan: Dexa hip/pelvis/spine*        Reviewed calcium and vit D    Chest pain:  Comment:non specific - had normal adenosine test 2/2017 - has nitro but not using it   Plan: follow       F/u 3 months for DM     Maryam Clayton MD, PhD

## 2017-11-29 NOTE — MR AVS SNAPSHOT
After Visit Summary   11/29/2017    Lindsay Nieves    MRN: [id number]           Patient Information     Date Of Birth          1945        Visit Information        Provider Department      11/29/2017 11:00 AM Maryam Clayton MD PhD [facility]        Today's Diagnoses     Routine general medical examination at a health care facility    -  1    Visit for screening mammogram        At risk for falling        Type 2 diabetes mellitus with complication, without long-term current use of insulin (H)        Hyperlipidemia LDL goal <100        Major depressive disorder, recurrent episode, mild (H)        History of colonic polyps        Type 2 diabetes mellitus with diabetic neuropathy, unspecified long term insulin use status (H)        Type 2 diabetes mellitus with other specified complication, with long-term current use of insulin (H)        Osteopenia of multiple sites          Care Instructions    Primary Care Center: [phone]     Primary Care Center Medication Refill Request Information:  * Please contact your pharmacy regarding ANY request for medication refills.  ** [facility] Prescription Fax = [phone]  * Please allow 3 business days for routine medication refills.  * Please allow 5 business days for controlled substance medication refills.     Primary Care Center Test Result notification information:  *You will be notified with in 7-10 days of your appointment day regarding the results of your test.  If you are on MyChart you will be notified as soon as the provider has reviewed the results and signed off on them.    Breast Center/Oncology ([facility]) [phone]   Breast Center ([facility]) [phone]   Radiology ([facility]) [phone]  Lab [phone]            Follow-ups after your visit        Additional Services     MED THERAPY MANAGE REFERRAL       Reason for referral:  diabetes    This service is designed to help you get the most from your  medications.  A specially trained pharmacist will work closely with you and your doctors  to solve any problems related to your medications and to help you get the   best results from taking them.      Please bring all of your prescription and non- prescription medications (such   as vitamins, over-the-counter medications, and herbals) or a detailed medication list to your appointment.  If you have a glucose meter or other home monitoring information, please bring this in (ie, blood glucose log, blood pressure log, pain log, etc.).    To schedule an appointment you may either schedule at the clinic or call: [place] Medication Therapy Management Scheduling (numerous locations) [phone]   [email or web address]                  Follow-up notes from your care team     Return in about 3 months (around 2/28/2018).      Your next 10 appointments already scheduled     Nov 29, 2017 12:30 PM CST   (Arrive by 12:15 PM)   SHORT with Marita Jolley RPH   [facility] Medication Therapy Management ([facility])    [address] [place] [address] [phone]              Future tests that were ordered for you today     Open Future Orders        Priority Expected Expires Ordered    Dexa hip/pelvis/spine* Routine  11/29/2018 11/29/2017    MA SCREENING DIGITAL BILAT - Future  (s+30) Routine  11/29/2018 11/29/2017    HEMOGLOBIN A1C -(Today) Routine 11/29/2017 11/29/2018 11/29/2017    Lipid panel reflex to direct LDL Fasting Routine 11/29/2017 11/29/2018 11/29/2017            Who to contact     Please call your clinic at [phone] to:    Ask questions about your health    Make or cancel appointments    Discuss your medicines    Learn about your test results    Speak to your doctor   If you have compliments or concerns about an experience at your clinic, or if you wish to file a complaint, please contact [facility]  Physicians Patient Relations at [phone] or email us at [email or web address]         Additional Information About Your Visit        [employer] Information     [employer] is an electronic gateway that provides easy, online access to your medical records. With [employer], you can request a clinic appointment, read your test results, renew a prescription or communicate with your care team.     To sign up for [employer] visit the website at [email or web address]   You will be asked to enter the access code listed below, as well as some personal information. Please follow the directions to create your username and password.     Your access code is: [email or web address]  Expires: 2018  6:30 AM     Your access code will  in 90 days. If you need help or a new code, please contact your [facility] or call [phone] for assistance.        Care EveryWhere ID     This is your Care EveryWhere ID. This could be used by other organizations to access your [place] medical records  [id number]        Your Vitals Were     Pulse Temperature Respirations Pulse Oximetry Breastfeeding?       71 98  F (36.7  C) (Oral) 18 98% No        Blood Pressure from Last 3 Encounters:   17 123/67   17 120/75   17 118/61    Weight from Last 3 Encounters:   17 88.5 kg (195 lb)   16 97.5 kg (215 lb)   16 97.5 kg (215 lb)              We Performed the Following     Basic metabolic panel     MED THERAPY MANAGE REFERRAL          Where to get your medicines      These medications were sent to [facility] [address] [facility] [address]  AT [facility] [address]  [address] [facility] [address]     Phone:  [phone]     glipiZIDE 10 MG 24 hr tablet    insulin pen needle 31G X 8 MM    liraglutide 18 MG/3ML soln    metFORMIN 1000 MG tablet    pravastatin 40 MG tablet          Primary Care Provider Office Phone # Fax #    Maryam Clayton MD PhD  [phone]       [address] [place] [address]  [facility] [address]        Equal Access to Services     BRIE AHMADI : Hadii aad ku hadarlenemelissa Michael, edward jeffreyteresaha, gaurav marandapedro gallego, cullen trinidad laNanettetroy chauhan. So [facility] [phone].    ATENCIÓN: Si habla español, tiene a resendiz disposición servicios gratuitos de asistencia lingüística. Llame al [phone].    We comply with applicable federal civil rights laws and [place] laws. We do not discriminate on the basis of race, color, national origin, age, disability, sex, sexual orientation, or gender identity.            Thank you!     Thank you for choosing [facility]  for your care. Our goal is always to provide you with excellent care. Hearing back from our patients is one way we can continue to improve our services. Please take a few minutes to complete the written survey that you may receive in the mail after your visit with us. Thank you!             Your Updated Medication List - Protect others around you: Learn how to safely use, store and throw away your medicines at www.disposemymeds.org.          This list is accurate as of: 11/29/17 12:24 PM.  Always use your most recent med list.                   Brand Name Dispense Instructions for use Diagnosis    acetaminophen 500 MG tablet    TYLENOL     Take 500-1,000 mg by mouth every 6 hours as needed for mild pain        ADVIL 200 MG tablet   Generic drug:  ibuprofen      Take 200 mg by mouth every 4 hours as needed.        aspirin 81 MG tablet      Take 81 mg by mouth daily        blood glucose monitoring lancets     1 Box    Use to test blood sugar 2-3 times daily or as directed.  Ok to substitute alternative if insurance prefers.    Type 2 diabetes mellitus with diabetic neuropathy, without long-term current use of insulin (H)       blood glucose monitoring meter device kit     1 kit    Use to test blood sugar 2-3 times daily or as directed.  Ok to substitute  alternative if insurance prefers.        blood glucose monitoring test strip    ACCU-CHEK CORTEZ PLUS    1 Box    Use to test blood sugar 2-3 times daily or as directed.  Ok to substitute alternative if insurance prefers.    Type 2 diabetes mellitus with diabetic neuropathy, without long-term current use of insulin (H)       calcium-vitamin D-vitamin K 500-500-40 MG-UNT-MCG Chew    VIACTIV     Take 1 tablet by mouth daily        FLUoxetine 10 MG capsule    PROzac    180 capsule    Take 2 capsules (20 mg) by mouth daily    Major depressive disorder, recurrent episode, mild (H)       glipiZIDE 10 MG 24 hr tablet    glipiZIDE XL    180 tablet    Take 2 tablets (20 mg) by mouth daily    Type 2 diabetes mellitus with diabetic neuropathy, unspecified long term insulin use status (H)       insulin pen needle 31G X 8 MM     90 each    Use with Victoza injections once daily.    Type 2 diabetes mellitus with other specified complication, with long-term current use of insulin (H)       liraglutide 18 MG/3ML soln    VICTOZA PEN    27 mL    Inject 1.8 mg Subcutaneous daily    Type 2 diabetes mellitus with diabetic neuropathy, unspecified long term insulin use status (H)       metFORMIN 1000 MG tablet    GLUCOPHAGE    180 tablet    Take 1 tablet (1,000 mg) by mouth 2 times daily (with meals)    Type 2 diabetes mellitus with complication, without long-term current use of insulin (H)       MULTIVITAMIN WOMEN 50+ PO      Take 1 tablet by mouth daily        nitroGLYcerin 0.4 MG sublingual tablet    NITROSTAT    25 tablet    For chest pain place 1 tablet under the tongue every 5 minutes for 3 doses. If symptoms persist 5 minutes after 1st dose call 911.    Chest tightness or pressure       pravastatin 40 MG tablet    PRAVACHOL    180 tablet    Take 2 tablets (80 mg) by mouth daily (please dispense as 40mg tabs)    Hyperlipidemia LDL goal <100       vitamin D 2000 UNITS tablet      Take 1 tablet by mouth daily.

## 2017-11-29 NOTE — NURSING NOTE
Chief Complaint   Patient presents with     Physical     Patient is here for annual physical.      Olga Mays LPN at 11:04 AM on 11/29/2017.

## 2017-11-29 NOTE — MR AVS SNAPSHOT
After Visit Summary   11/29/2017    Lindsay Nieves    MRN: [id number]           Patient Information     Date Of Birth          1945        Visit Information        Provider Department      11/29/2017 12:30 PM Marita Jolley RPH M [facility] Medication Therapy Management        Care Instructions    Recommendations from today's MTM visit:                                                      1. I will make sure I am comfortable having you on both Januvia and Victoza and will contact you by Friday.    2. Please continue to work hard on diet and exercise, as Januvia is not likely to drop you into the normal range on its own.    Next MTM visit: I will call Friday with a decision on the Januvia    To schedule another MTM appointment, please call the clinic directly or you may call the MTM scheduling line at [phone] or toll-free at [phone].     My Clinical Pharmacist's contact information:                                                      It was a pleasure seeing you today!  Please feel free to contact me with any questions or concerns you have.      Marita Jolley PharmD  Medication Therapy Management Provider  Phone:[phone]  Pager: [phone]  [email or web address]    You may receive a survey about the MTM services you received.  I would appreciate your feedback to help me serve you better in the future. Please fill it out and return it when you can. Your comments will be anonymous.                      Follow-ups after your visit        Your next 10 appointments already scheduled     Dec 01, 2017  1:00 PM CST   TELEMEDICINE with ЕЛЕНА Samaniego [facility] Medication Therapy Management ([facility] [place])    [address] [place] [address] [phone]           Note: this is not an onsite visit; there is no need to come to the facility.              Future tests that were ordered for you today     Open Future Orders      "   Priority Expected Expires Ordered    Dexa hip/pelvis/spine* Routine  2018    MA SCREENING DIGITAL BILAT - Future  (s+30) Routine  2018    HEMOGLOBIN A1C -(Today) Routine 2017    Lipid panel reflex to direct LDL Fasting Routine 2017            Who to contact     If you have questions or need follow up information about today's clinic visit or your schedule please contact  [facility] MEDICATION THERAPY MANAGEMENT directly at [phone].  Normal or non-critical lab and imaging results will be communicated to you by [employer], letter or phone within 4 business days after the clinic has received the results. If you do not hear from us within 7 days, please contact the clinic through [employer] or phone. If you have a critical or abnormal lab result, we will notify you by phone as soon as possible.  Submit refill requests through [employer] or call your pharmacy and they will forward the refill request to us. Please allow 3 business days for your refill to be completed.          Additional Information About Your Visit        [employer]akeeper Information     [employer] lets you send messages to your doctor, view your test results, renew your prescriptions, schedule appointments and more. To sign up, go to [email or web address] . Click on \"Log in\" on the left side of the screen, which will take you to the Welcome page. Then click on \"Sign up Now\" on the right side of the page.     You will be asked to enter the access code listed below, as well as some personal information. Please follow the directions to create your username and password.     Your access code is: [email or web address]  Expires: 2018  6:30 AM     Your access code will  in 90 days. If you need help or a new code, please call your [place] clinic or [phone].        Care EveryWhere ID     This is your Care EveryWhere ID. This could be used by other organizations to access your " [place] medical records  [id number]         Blood Pressure from Last 3 Encounters:   11/29/17 123/67   05/17/17 120/75   03/16/17 118/61    Weight from Last 3 Encounters:   03/16/17 88.5 kg (195 lb)   05/12/16 97.5 kg (215 lb)   04/27/16 97.5 kg (215 lb)              Today, you had the following     No orders found for display         Where to get your medicines      These medications were sent to [facility] [address] [place] [address] AT [facility] [address]  [address] [facility] [address]     Phone:  [phone]     glipiZIDE 10 MG 24 hr tablet    insulin pen needle 31G X 8 MM    liraglutide 18 MG/3ML soln    metFORMIN 1000 MG tablet    pravastatin 40 MG tablet          Primary Care Provider Office Phone # Fax #    Maryam Clayton MD [facility] [phone]       [address] [place] [address]        Equal Access to Services     BRIE AHMADI AH: Hadii aad ku hadasho Soomaali, waaxda luqadaha, qaybta kaalmada adeegyada, waxay idiin hayaan dex khkirsten trujillo . So [facility] [phone].    ATENCIÓN: Si habla español, tiene a resendiz disposición servicios gratuitos de asistencia lingüística. Llame al [phone].    We comply with applicable federal civil rights laws and [place] laws. We do not discriminate on the basis of race, color, national origin, age, disability, sex, sexual orientation, or gender identity.            Thank you!     Thank you for choosing [facility] MEDICATION THERAPY MANAGEMENT  for your care. Our goal is always to provide you with excellent care. Hearing back from our patients is one way we can continue to improve our services. Please take a few minutes to complete the written survey that you may receive in the mail after your visit with us. Thank you!             Your Updated Medication List - Protect others around you: Learn how to safely use, store and throw away your medicines at www.disposemymeds.org.          This list is accurate as of:  11/29/17 12:57 PM.  Always use your most recent med list.                   Brand Name Dispense Instructions for use Diagnosis    acetaminophen 500 MG tablet    TYLENOL     Take 500-1,000 mg by mouth every 6 hours as needed for mild pain        ADVIL 200 MG tablet   Generic drug:  ibuprofen      Take 200 mg by mouth every 4 hours as needed.        aspirin 81 MG tablet      Take 81 mg by mouth daily        blood glucose monitoring lancets     1 Box    Use to test blood sugar 2-3 times daily or as directed.  Ok to substitute alternative if insurance prefers.    Type 2 diabetes mellitus with diabetic neuropathy, without long-term current use of insulin (H)       blood glucose monitoring meter device kit     1 kit    Use to test blood sugar 2-3 times daily or as directed.  Ok to substitute alternative if insurance prefers.        blood glucose monitoring test strip    ACCU-CHEK CORTEZ PLUS    1 Box    Use to test blood sugar 2-3 times daily or as directed.  Ok to substitute alternative if insurance prefers.    Type 2 diabetes mellitus with diabetic neuropathy, without long-term current use of insulin (H)       calcium-vitamin D-vitamin K 500-500-40 MG-UNT-MCG Chew    VIACTIV     Take 1 tablet by mouth daily        FLUoxetine 10 MG capsule    PROzac    180 capsule    Take 2 capsules (20 mg) by mouth daily    Major depressive disorder, recurrent episode, mild (H)       glipiZIDE 10 MG 24 hr tablet    glipiZIDE XL    180 tablet    Take 2 tablets (20 mg) by mouth daily        insulin pen needle 31G X 8 MM     90 each    Use with Victoza injections once daily.    Type 2 diabetes mellitus with other specified complication, with long-term current use of insulin (H)       liraglutide 18 MG/3ML soln    VICTOZA PEN    27 mL    Inject 1.8 mg Subcutaneous daily        metFORMIN 1000 MG tablet    GLUCOPHAGE    180 tablet    Take 1 tablet (1,000 mg) by mouth 2 times daily (with meals)    Type 2 diabetes mellitus with complication,  without long-term current use of insulin (H)       MULTIVITAMIN WOMEN 50+ PO      Take 1 tablet by mouth daily        nitroGLYcerin 0.4 MG sublingual tablet    NITROSTAT    25 tablet    For chest pain place 1 tablet under the tongue every 5 minutes for 3 doses. If symptoms persist 5 minutes after 1st dose call 911.    Chest tightness or pressure       pravastatin 40 MG tablet    PRAVACHOL    180 tablet    Take 2 tablets (80 mg) by mouth daily (please dispense as 40mg tabs)    Hyperlipidemia LDL goal <100       vitamin D 2000 UNITS tablet      Take 1 tablet by mouth daily.

## 2017-11-29 NOTE — PATIENT INSTRUCTIONS
Recommendations from today's MTM visit:                                                      1. I will make sure I am comfortable having you on both Januvia and Victoza and will contact you by Friday.    2. Please continue to work hard on diet and exercise, as Januvia is not likely to drop you into the normal range on its own.    Next MTM visit: I will call Friday with a decision on the Januvia    To schedule another MTM appointment, please call the clinic directly or you may call the MTM scheduling line at [phone] or toll-free at [phone].     My Clinical Pharmacist's contact information:                                                      It was a pleasure seeing you today!  Please feel free to contact me with any questions or concerns you have.      Marita Jolley PharmD  Medication Therapy Management Provider  Phone:[phone]  Pager: [phone]  [email or web address]    You may receive a survey about the MTM services you received.  I would appreciate your feedback to help me serve you better in the future. Please fill it out and return it when you can. Your comments will be anonymous.

## 2017-11-29 NOTE — PATIENT INSTRUCTIONS
[facility]: [phone]     Primary Care Center Medication Refill Request Information:  * Please contact your pharmacy regarding ANY request for medication refills.  ** [facility] Prescription Fax = [phone]  * Please allow 3 business days for routine medication refills.  * Please allow 5 business days for controlled substance medication refills.     Primary Care Center Test Result notification information:  *You will be notified with in 7-10 days of your appointment day regarding the results of your test.  If you are on MyChart you will be notified as soon as the provider has reviewed the results and signed off on them.    Breast Center/Oncology ([facility]) [phone]   Breast Center ([facility]) [phone]   Radiology ([facility]) [phone]  Lab [phone]

## 2017-11-29 NOTE — PROGRESS NOTES
SUBJECTIVE/OBJECTIVE:                Lindsay Nieves is a 72 year old female coming in for a follow-up visit for Medication Therapy Management.     Chief Complaint: Follow up from our visit on 5/17.  Pt is still having elevated blood sugars.    Tobacco: No tobacco use  Alcohol: Less than 1 beverages / week    Medication Adherence: no issues reported    Diabetes:  Pt currently taking metformin 1000mg BID, Victoza 1.8mg daily, and glipizide 20mg daily.. Pt is not experiencing side effects.  SMBG: two times daily.   Ranges (patient reported): 170-180 in the morning, evening postprandial readings are 150-160, occasionally 200.  Patient is not experiencing hypoglycemia  Recent symptoms of high blood sugar? none    Pt is open to starting something new but would like to avoid insulin dependence if at all possible and to avoid SGLT-2s as she has history of chronic UTI. Ideally she would still like to be given more time to work on lifestyle modification.    Current labs include:  BP Readings from Last 3 Encounters:   11/29/17 123/67   05/17/17 120/75   03/16/17 118/61     Today's Vitals: There were no vitals taken for this visit.  Lab Results   Component Value Date    A1C 8.4 05/12/2017   .  Lab Results   Component Value Date    CHOL 153 12/29/2016     Lab Results   Component Value Date    TRIG 164 12/29/2016     Lab Results   Component Value Date    HDL 63 12/29/2016     Lab Results   Component Value Date    LDL 57 12/29/2016       Liver Function Studies -   Recent Labs   Lab Test  02/25/13   0826   PROTTOTAL  7.2   ALBUMIN  4.0   BILITOTAL  0.5   ALKPHOS  74   AST  20   ALT  30       Lab Results   Component Value Date    UCRR 25 05/12/2017    MICROL <5 05/12/2017    UMALCR Unable to calculate due to low value 05/12/2017       Last Basic Metabolic Panel:  Lab Results   Component Value Date     02/08/2017      Lab Results   Component Value Date    POTASSIUM 4.1 02/08/2017     Lab Results   Component Value Date     CHLORIDE 105 02/08/2017     Lab Results   Component Value Date    BUN 11 02/08/2017     Lab Results   Component Value Date    CR 0.64 02/08/2017     GFR Estimate   Date Value Ref Range Status   02/08/2017 >90  Non  GFR Calc   >60 mL/min/1.7m2 Final   04/27/2016 >90  Non  GFR Calc   >60 mL/min/1.7m2 Final   09/02/2015 >90  Non  GFR Calc   >60 mL/min/1.7m2 Final     GFR Estimate If Black   Date Value Ref Range Status   02/08/2017 >90   GFR Calc   >60 mL/min/1.7m2 Final   04/27/2016 >90   GFR Calc   >60 mL/min/1.7m2 Final   09/02/2015 >90   GFR Calc   >60 mL/min/1.7m2 Final     TSH   Date Value Ref Range Status   05/12/2017 2.38 0.40 - 4.00 mU/L Final   ]    Most Recent Immunizations   Administered Date(s) Administered     HepB 09/14/2016     Influenza (High Dose) 3 valent vaccine 10/04/2017     Influenza (IIV3) PF 10/03/2012     Pneumococcal (PCV 13) 11/03/2014     Pneumococcal (PCV 7) 04/12/2006     Pneumococcal 23 valent 02/25/2013     TDAP Vaccine (Boostrix) 02/25/2013     Tetanus 09/19/2005     Zoster vaccine, live 05/18/2009       ASSESSMENT:              Current medications were reviewed today as discussed above.      Medication Adherence: no issues identified    Diabetes: Needs Improvement. Patient is not meeting A1c goal of < 7%. Self monitoring of blood glucose is not at goal of fasting  mg/dL and post prandial < 180 mg/dL. We discussed options and as patient is wanting to avoid insulin, I offered to see if there is anything in recent literature supporting use of GLP-1s and DPP-4s together, as historically there has not been support for this. In review of the literature, this combination is still not recommended due to underwhelming drop in A1c (around 0.3%) and lack of sufficient safety data, though weight loss does appear to be additive. With this knowledge, could consider seeing if patient is open to  combination GLP-1/insulin product (Soliqua or Xultophy), which at higher doses could potentially allow her to come off glipizide, simplifying her regimen.     PLAN:                  1. I will call patient on Friday to discuss findings and options that were not discussed during visit today.    I spent 30 minutes with this patient today  . All changes were made via collaborative practice agreement with Maryam Clayton. A copy of the visit note was provided to the patient's primary care provider.     Will follow up on Friday.    The patient was given a summary of these recommendations as an after visit summary.    Marita Jolley, Martha  Medication Therapy Management Provider  Phone:[phone]  Pager: [phone]

## 2017-12-01 ENCOUNTER — TELEPHONE (OUTPATIENT)
Dept: PHARMACY | Facility: CLINIC | Age: 72
End: 2017-12-01

## 2017-12-01 NOTE — TELEPHONE ENCOUNTER
Called pt and discussed findings/ plan from visit on Wednesday. Pt is reluctantly open to starting Xultophy (liraglutide/degludec). We discussed that since she is feels so frustrated with the idea of starting insulin, and the lack of familiarity with new combo therapies, pt and I will talk to her next week about starting therapy and self-adjustment of dosing. Pt scheduled for Wednesday.    Jacqueline MendezD  Medication Therapy Management Provider  Phone:[phone]  Pager: [phone]

## 2017-12-06 ENCOUNTER — ALLIED HEALTH/NURSE VISIT (OUTPATIENT)
Dept: PHARMACY | Facility: CLINIC | Age: 72
End: 2017-12-06
Payer: COMMERCIAL

## 2017-12-06 DIAGNOSIS — E11.40 TYPE 2 DIABETES MELLITUS WITH DIABETIC NEUROPATHY, UNSPECIFIED LONG TERM INSULIN USE STATUS: Primary | ICD-10-CM

## 2017-12-06 PROCEDURE — 99607 MTMS BY PHARM ADDL 15 MIN: CPT | Mod: U4 | Performed by: PHARMACIST

## 2017-12-06 PROCEDURE — 99606 MTMS BY PHARM EST 15 MIN: CPT | Mod: U4 | Performed by: PHARMACIST

## 2017-12-06 NOTE — PATIENT INSTRUCTIONS
Recommendations from today's MTM visit:                                                      1.  and start taking Xultophy, 16 units once daily. Let me know if you have coverage issues.    2. Stop taking Victoza.    Next MTM visit: We will follow up later this week/early next week depending on coverage.    To schedule another MTM appointment, please call the clinic directly or you may call the MTM scheduling line at [phone] or toll-free at [phone].     My Clinical Pharmacist's contact information:                                                      It was a pleasure seeing you today!  Please feel free to contact me with any questions or concerns you have.      Marita Jolley PharmD  Medication Therapy Management Provider  Phone: [phone]  Pager: [phone]    You may receive a survey about the MTM services you received.  I would appreciate your feedback to help me serve you better in the future. Please fill it out and return it when you can. Your comments will be anonymous.

## 2017-12-06 NOTE — MR AVS SNAPSHOT
After Visit Summary   12/6/2017    Lindsay Nieves    MRN: [id number]           Patient Information     Date Of Birth          1945        Visit Information        Provider Department      12/6/2017 9:30 AM Marita Jolley RPH [facility] Medication Therapy Management        Today's Diagnoses     Type 2 diabetes mellitus with diabetic neuropathy, unspecified long term insulin use status (H)    -  1      Care Instructions    Recommendations from today's MTM visit:                                                      1.  and start taking Xultophy, 16 units once daily. Let me know if you have coverage issues.    2. Stop taking Victoza.    Next MTM visit: We will follow up later this week/early next week depending on coverage.    To schedule another MTM appointment, please call the clinic directly or you may call the MTM scheduling line at [phone] or toll-free at [phone].     My Clinical Pharmacist's contact information:                                                      It was a pleasure seeing you today!  Please feel free to contact me with any questions or concerns you have.      Marita Jolley, PharmD  Medication Therapy Management Provider  Phone: [phone]  Pager: [phone]    You may receive a survey about the MTM services you received.  I would appreciate your feedback to help me serve you better in the future. Please fill it out and return it when you can. Your comments will be anonymous.                    Follow-ups after your visit        Your next 10 appointments already scheduled     Dec 08, 2017  2:30 PM CST   TELEMEDICINE with [facility] Medication Therapy Management ([facility])    [address] [place] [id number] [phone]           Note: this is not an onsite visit; there is no need to come to the facility.              Who to contact     If you have questions or need  "follow up information about today's clinic visit or your schedule please contact [facility] MEDICATION THERAPY MANAGEMENT directly at [phone].  Normal or non-critical lab and imaging results will be communicated to you by [employer], letter or phone within 4 business days after the clinic has received the results. If you do not hear from us within 7 days, please contact the clinic through [employer] or phone. If you have a critical or abnormal lab result, we will notify you by phone as soon as possible.  Submit refill requests through [employer] or call your pharmacy and they will forward the refill request to us. Please allow 3 business days for your refill to be completed.          Additional Information About Your Visit        [employer] Information     [employer] lets you send messages to your doctor, view your test results, renew your prescriptions, schedule appointments and more. To sign up, go to [email or web address] . Click on \"Log in\" on the left side of the screen, which will take you to the Welcome page. Then click on \"Sign up Now\" on the right side of the page.     You will be asked to enter the access code listed below, as well as some personal information. Please follow the directions to create your username and password.     Your access code is: [email or web address]  Expires: 2018  6:30 AM     Your access code will  in 90 days. If you need help or a new code, please call your [facility] clinic or [phone].        Care EveryWhere ID     This is your Care EveryWhere ID. This could be used by other organizations to access your [facility] medical records  [id number]         Blood Pressure from Last 3 Encounters:   17 123/67   17 120/75   17 118/61    Weight from Last 3 Encounters:   17 195 lb (88.5 kg)   16 215 lb (97.5 kg)   16 215 lb (97.5 kg)              Today, you had the following     No orders found for display         Today's Medication Changes          These " changes are accurate as of: 12/6/17 11:59 PM.  If you have any questions, ask your nurse or doctor.               Start taking these medicines.        Dose/Directions    insulin degludec-liraglutide pen   Commonly known as:  XULTOPHY 100/3.6   Used for:  Type 2 diabetes mellitus with diabetic neuropathy, unspecified long term insulin use status (H)        Dose:  16 Units   Inject 16 Units Subcutaneous daily titrating as directed   Quantity:  15 mL   Refills:  1         Stop taking these medicines if you haven't already. Please contact your care team if you have questions.     liraglutide 18 MG/3ML soln   Commonly known as:  VICTOZA PEN                Where to get your medicines      These medications were sent to [facility] [address] [place] [address] AT [facility]  [address] [place] [address]     Phone:  [phone]     insulin degludec-liraglutide pen                Primary Care Provider Office Phone # Fax #    Maryam Clayton MD PhD [phone]       [address] [place] [address]        Equal Access to Services     [facility]: Hadii aad ku hadasho Soomaali, waaxda luqadaha, qaybta kaalmada adeegyamindi, cullen trujillo . So [facility] [phone].    ATENCIÓN: Si habla español, tiene a resendiz disposición servicios gratuitos de asistencia lingüística. [facility] [phone].    We comply with applicable federal civil rights laws and [place] laws. We do not discriminate on the basis of race, color, national origin, age, disability, sex, sexual orientation, or gender identity.            Thank you!     Thank you for choosing [facility] MEDICATION THERAPY MANAGEMENT  for your care. Our goal is always to provide you with excellent care. Hearing back from our patients is one way we can continue to improve our services. Please take a few minutes to complete the written survey that you may receive in the mail after your visit with us.  Thank you!             Your Updated Medication List - Protect others around you: Learn how to safely use, store and throw away your medicines at www.disposemymeds.org.          This list is accurate as of: 12/6/17 11:59 PM.  Always use your most recent med list.                   Brand Name Dispense Instructions for use Diagnosis    acetaminophen 500 MG tablet    TYLENOL     Take 500-1,000 mg by mouth every 6 hours as needed for mild pain        ADVIL 200 MG tablet   Generic drug:  ibuprofen      Take 200 mg by mouth every 4 hours as needed.        aspirin 81 MG tablet      Take 81 mg by mouth daily        blood glucose monitoring lancets     1 Box    Use to test blood sugar 2-3 times daily or as directed.  Ok to substitute alternative if insurance prefers.    Type 2 diabetes mellitus with diabetic neuropathy, without long-term current use of insulin (H)       blood glucose monitoring meter device kit     1 kit    Use to test blood sugar 2-3 times daily or as directed.  Ok to substitute alternative if insurance prefers.        blood glucose monitoring test strip    ACCU-CHEK CORTEZ PLUS    1 Box    Use to test blood sugar 2-3 times daily or as directed.  Ok to substitute alternative if insurance prefers.    Type 2 diabetes mellitus with diabetic neuropathy, without long-term current use of insulin (H)       calcium-vitamin D-vitamin K 500-500-40 MG-UNT-MCG Chew    VIACTIV     Take 1 tablet by mouth daily        FLUoxetine 10 MG capsule    PROzac    180 capsule    Take 2 capsules (20 mg) by mouth daily    Major depressive disorder, recurrent episode, mild (H)       glipiZIDE 10 MG 24 hr tablet    glipiZIDE XL    180 tablet    Take 2 tablets (20 mg) by mouth daily        insulin degludec-liraglutide pen    XULTOPHY 100/3.6    15 mL    Inject 16 Units Subcutaneous daily titrating as directed    Type 2 diabetes mellitus with diabetic neuropathy, unspecified long term insulin use status (H)       insulin pen needle 31G X  8 MM     90 each    Use with Victoza injections once daily.    Type 2 diabetes mellitus with other specified complication, with long-term current use of insulin (H)       metFORMIN 1000 MG tablet    GLUCOPHAGE    180 tablet    Take 1 tablet (1,000 mg) by mouth 2 times daily (with meals)    Type 2 diabetes mellitus with complication, without long-term current use of insulin (H)       MULTIVITAMIN WOMEN 50+ PO      Take 1 tablet by mouth daily        nitroGLYcerin 0.4 MG sublingual tablet    NITROSTAT    25 tablet    For chest pain place 1 tablet under the tongue every 5 minutes for 3 doses. If symptoms persist 5 minutes after 1st dose call 911.    Chest tightness or pressure       pravastatin 40 MG tablet    PRAVACHOL    180 tablet    Take 2 tablets (80 mg) by mouth daily (please dispense as 40mg tabs)    Hyperlipidemia LDL goal <100       vitamin D 2000 UNITS tablet      Take 1 tablet by mouth daily.

## 2017-12-06 NOTE — PROGRESS NOTES
SUBJECTIVE/OBJECTIVE:                Lindsay Nieves is a 72 year old female coming in for a follow-up visit for Medication Therapy Management.     Chief Complaint: Follow up from our visit on 11/29.  Pt called to discuss start of new diabetes therapy.    Tobacco: No tobacco use  Alcohol: Less than 1 beverages / week    Medication Adherence: no issues reported    Diabetes:  Pt currently taking metformin 1000mg BID, Victoza 1.8mg daily, and glipizide 20mg daily. Pt is not experiencing side effects.  SMBG: two times daily.   Ranges (patient reported): 170-180 in the morning, evening postprandial readings are 150-160, occasionally 200.  Patient is not experiencing hypoglycemia  Recent symptoms of high blood sugar? none  In discussing options further, Pt is open to trying Xultophy if it is covered by her insurance.    Current labs include:  BP Readings from Last 3 Encounters:   11/29/17 123/67   05/17/17 120/75   03/16/17 118/61     Today's Vitals: There were no vitals taken for this visit.  Lab Results   Component Value Date    A1C 8.4 05/12/2017   .  Lab Results   Component Value Date    CHOL 153 12/29/2016     Lab Results   Component Value Date    TRIG 164 12/29/2016     Lab Results   Component Value Date    HDL 63 12/29/2016     Lab Results   Component Value Date    LDL 57 12/29/2016       Liver Function Studies -   Recent Labs   Lab Test  02/25/13   0826   PROTTOTAL  7.2   ALBUMIN  4.0   BILITOTAL  0.5   ALKPHOS  74   AST  20   ALT  30       Lab Results   Component Value Date    UCRR 25 05/12/2017    MICROL <5 05/12/2017    UMALCR Unable to calculate due to low value 05/12/2017       Last Basic Metabolic Panel:  Lab Results   Component Value Date     02/08/2017      Lab Results   Component Value Date    POTASSIUM 4.1 02/08/2017     Lab Results   Component Value Date    CHLORIDE 105 02/08/2017     Lab Results   Component Value Date    BUN 11 02/08/2017     Lab Results   Component Value Date    CR 0.64  02/08/2017     GFR Estimate   Date Value Ref Range Status   02/08/2017 >90  Non  GFR Calc   >60 mL/min/1.7m2 Final   04/27/2016 >90  Non  GFR Calc   >60 mL/min/1.7m2 Final   09/02/2015 >90  Non  GFR Calc   >60 mL/min/1.7m2 Final     GFR Estimate If Black   Date Value Ref Range Status   02/08/2017 >90   GFR Calc   >60 mL/min/1.7m2 Final   04/27/2016 >90   GFR Calc   >60 mL/min/1.7m2 Final   09/02/2015 >90   GFR Calc   >60 mL/min/1.7m2 Final     TSH   Date Value Ref Range Status   05/12/2017 2.38 0.40 - 4.00 mU/L Final   ]    Most Recent Immunizations   Administered Date(s) Administered     HepB 09/14/2016     Influenza (High Dose) 3 valent vaccine 10/04/2017     Influenza (IIV3) PF 10/03/2012     Pneumococcal (PCV 13) 11/03/2014     Pneumococcal (PCV 7) 04/12/2006     Pneumococcal 23 valent 02/25/2013     TDAP Vaccine (Boostrix) 02/25/2013     Tetanus 09/19/2005     Zoster vaccine, live 05/18/2009       ASSESSMENT:              Current medications were reviewed today as discussed above.      Medication Adherence: no issues identified    Diabetes: Needs Improvement. Patient is not meeting A1c goal of < 7%. Self monitoring of blood glucose is not at goal of fasting  mg/dL and post prandial < 180 mg/dL. PT would benefit from starting Xultophy.     PLAN:                  1. Pt to discontinue Victoza, start Xultophy 16 units daily. Order sent.    I spent 30 minutes with this patient today  . All changes were made via collaborative practice agreement with Maryam Clayton. A copy of the visit note was provided to the patient's primary care provider.     Will follow up later this week or early next week depending on coverage.    The patient was given a summary of these recommendations as an after visit summary.    Marita Jolley PharmD  Medication Therapy Management Provider  Phone:[phone]  Pager: [phone]

## 2017-12-11 ENCOUNTER — TELEPHONE (OUTPATIENT)
Dept: INTERNAL MEDICINE | Facility: CLINIC | Age: 72
End: 2017-12-11

## 2017-12-11 NOTE — TELEPHONE ENCOUNTER
Central Prior Authorization Team   Phone: [phone]      PA Initiation    Medication: insulin degludec-liraglutide (XULTOPHY 100/3.6) pen-Initiated  Insurance Company: [employer] - Phone [phone] Fax [phone]  Pharmacy Filling the Rx: [facility] [address] [place] [address] [address] [place] [facility] [address]  Filling Pharmacy Phone: [phone]  Filling Pharmacy Fax:    Start Date: 12/11/2017

## 2017-12-12 NOTE — TELEPHONE ENCOUNTER
Prior Authorization Approval    Authorization Effective Date: 12/11/2017  Authorization Expiration Date: 12/31/1999  Medication: insulin degludec-liraglutide (XULTOPHY 100/3.6) pen-APPROVED  Approved Dose/Quantity:   Reference #:     Insurance Company: [employer] - Phone [phone] Fax [phone]  Expected CoPay: $90.00     CoPay Card Available:      Foundation Assistance Needed:    Which Pharmacy is filling the prescription (Not needed for infusion/clinic administered): [facility] [address] [place] [address] AT [facility] [place] [place] [facility] [address]  Pharmacy Notified: Yes  Patient Notified: Yes

## 2017-12-15 ENCOUNTER — TELEPHONE (OUTPATIENT)
Dept: PHARMACY | Facility: CLINIC | Age: 72
End: 2017-12-15

## 2017-12-15 NOTE — TELEPHONE ENCOUNTER
Pt called to check on Xultophy status and she was finally able to pick it up today. She has not started it yet. As such I will discuss it with her next week to see if he has anything else.    Jacqueline MendezD  Medication Therapy Management Provider  Phone: [phone]  Pager: [phone]

## 2017-12-20 ENCOUNTER — ALLIED HEALTH/NURSE VISIT (OUTPATIENT)
Dept: PHARMACY | Facility: CLINIC | Age: 72
End: 2017-12-20
Payer: COMMERCIAL

## 2017-12-20 DIAGNOSIS — E11.40 TYPE 2 DIABETES MELLITUS WITH DIABETIC NEUROPATHY, WITHOUT LONG-TERM CURRENT USE OF INSULIN (H): Primary | ICD-10-CM

## 2017-12-20 PROCEDURE — 99606 MTMS BY PHARM EST 15 MIN: CPT | Mod: U4 | Performed by: PHARMACIST

## 2017-12-20 NOTE — PROGRESS NOTES
SUBJECTIVE/OBJECTIVE:                Lindsay Nieves is a 72 year old female called for a follow-up visit for Medication Therapy Management.      Chief Complaint: Follow up from our visit on 12/6.  Pt called to follow up on Xultophy start and adjust as needed.    Tobacco: No tobacco use  Alcohol: Less than 1 beverages / week    Medication Adherence: no issues reported    Diabetes:  Pt currently taking metformin 2000mg BID, glipizide 20mg daily, and Xultophy 16 units daily. Pt is not experiencing side effects.  SMBG: two times daily.   Ranges (patient reported):     Fasting 2h PP  Sunday 195   187  Monday  96   161  Tuesday  106   214  Wednesday  130  Pt reports the 214 was due to picking up take out with breaded shrimp and potatoes which she realizes was too much carbohydrate and will choose either breading or potatoes in the future.  Patient is not experiencing hypoglycemia  Recent symptoms of high blood sugar? none    Current labs include:  BP Readings from Last 3 Encounters:   11/29/17 123/67   05/17/17 120/75   03/16/17 118/61     Today's Vitals: There were no vitals taken for this visit.  Lab Results   Component Value Date    A1C 8.4 05/12/2017   .  Lab Results   Component Value Date    CHOL 153 12/29/2016     Lab Results   Component Value Date    TRIG 164 12/29/2016     Lab Results   Component Value Date    HDL 63 12/29/2016     Lab Results   Component Value Date    LDL 57 12/29/2016       Liver Function Studies -   Recent Labs   Lab Test  02/25/13   0826   PROTTOTAL  7.2   ALBUMIN  4.0   BILITOTAL  0.5   ALKPHOS  74   AST  20   ALT  30       Lab Results   Component Value Date    UCRR 25 05/12/2017    MICROL <5 05/12/2017    UMALCR Unable to calculate due to low value 05/12/2017       Last Basic Metabolic Panel:  Lab Results   Component Value Date     02/08/2017      Lab Results   Component Value Date    POTASSIUM 4.1 02/08/2017     Lab Results   Component Value Date    CHLORIDE 105 02/08/2017     Lab  Results   Component Value Date    BUN 11 02/08/2017     Lab Results   Component Value Date    CR 0.64 02/08/2017     GFR Estimate   Date Value Ref Range Status   02/08/2017 >90  Non  GFR Calc   >60 mL/min/1.7m2 Final   04/27/2016 >90  Non  GFR Calc   >60 mL/min/1.7m2 Final   09/02/2015 >90  Non  GFR Calc   >60 mL/min/1.7m2 Final     GFR Estimate If Black   Date Value Ref Range Status   02/08/2017 >90   GFR Calc   >60 mL/min/1.7m2 Final   04/27/2016 >90   GFR Calc   >60 mL/min/1.7m2 Final   09/02/2015 >90   GFR Calc   >60 mL/min/1.7m2 Final     TSH   Date Value Ref Range Status   05/12/2017 2.38 0.40 - 4.00 mU/L Final   ]    Most Recent Immunizations   Administered Date(s) Administered     HepB 09/14/2016     Influenza (High Dose) 3 valent vaccine 10/04/2017     Influenza (IIV3) PF 10/03/2012     Pneumo Conj 13-V (2010&after) 11/03/2014     Pneumococcal (PCV 7) 04/12/2006     Pneumococcal 23 valent 02/25/2013     TDAP Vaccine (Boostrix) 02/25/2013     Tetanus 09/19/2005     Zoster vaccine, live 05/18/2009       ASSESSMENT:              Current medications were reviewed today as discussed above.        Medication Adherence: no issues identified    Diabetes: Improved. Self monitoring of blood glucose is closer to goal of fasting  mg/dL and post prandial < 180 mg/dL and patient acknowledges that highs are diet-related and can be improved.     PLAN:                  1. No dosing changes needed today.    2. Future consideration of patient remains stable could be cross-tapering off glipizide and onto a higher dose of Xultophy to leverage weightloss of GLP-1 better and reduce pill burden (and weight burden of SFU).    I spent 15 minutes with this patient today  . All changes were made via collaborative practice agreement with Maryam Clayton. A copy of the visit note was provided to the patient's primary care provider.      Will follow up in 2 weeks with Martin Murdock PharmD to determine next steps.    The patient was given a summary of these recommendations as an after visit summary.    Marita Jolley PharmD  Medication Therapy Management Provider  Phone:[phone]  Pager: [phone]

## 2017-12-20 NOTE — MR AVS SNAPSHOT
After Visit Summary   12/20/2017    Lindsay Nieves    MRN: [id number]           Patient Information     Date Of Birth          1945        Visit Information        Provider Department      12/20/2017 11:30 AM Marita Jolley [facility] Medication Therapy Management        Today's Diagnoses     Type 2 diabetes mellitus with diabetic neuropathy, without long-term current use of insulin (H)    -  1       Follow-ups after your visit        Your next 10 appointments already scheduled     Dec 20, 2017 11:30 AM CST   TELEMEDICINE with Marita Jolley [facility] Medication Therapy Management ([facility])    [address] [place] [id number] [phone]           Note: this is not an onsite visit; there is no need to come to the facility.            Jan 04, 2018 11:00 AM CST   TELEMEDICINE with Cassandra Murdock [facility] Medication Therapy Management ([facility])    [address] [place] [id number] [phone]           Note: this is not an onsite visit; there is no need to come to the facility.              Who to contact     If you have questions or need follow up information about today's clinic visit or your schedule please contact [facility] THERAPY On license of [facility] directly at [phone].  Normal or non-critical lab and imaging results will be communicated to you by MyChart, letter or phone within 4 business days after the clinic has received the results. If you do not hear from us within 7 days, please contact the clinic through MyChart or phone. If you have a critical or abnormal lab result, we will notify you by phone as soon as possible.  Submit refill requests through [employer] or call your pharmacy and they will forward the refill request to us. Please allow 3 business days for your refill to be completed.          Additional Information About Your Visit    "     MyChart Information     momondo lets you send messages to your doctor, view your test results, renew your prescriptions, schedule appointments and more. To sign up, go to [email or web address] . Click on \"Log in\" on the left side of the screen, which will take you to the Welcome page. Then click on \"Sign up Now\" on the right side of the page.     You will be asked to enter the access code listed below, as well as some personal information. Please follow the directions to create your username and password.     Your access code is: [email or web address]  Expires: 2018  6:30 AM     Your access code will  in 90 days. If you need help or a new code, please call your [facility] clinic or [phone].        Care EveryWhere ID     This is your Care EveryWhere ID. This could be used by other organizations to access your [facility] medical records  [id number]         Blood Pressure from Last 3 Encounters:   17 123/67   17 120/75   17 118/61    Weight from Last 3 Encounters:   17 195 lb (88.5 kg)   16 215 lb (97.5 kg)   16 215 lb (97.5 kg)              Today, you had the following     No orders found for display       Primary Care Provider Office Phone # Fax #    Maryam Clayton MD PhD [phone]       [address] [place] [id number]        Equal Access to Services     BRIE AHMADI AH: Hadii aad ku hadasho Soomaali, waaxda luqadaha, qaybta kaalmada adeegyada, cullen rtujillo . So [facility] [phone].    ATENCIÓN: Si habla español, tiene a resendiz disposición servicios gratuitos de asistencia lingüística. Llame al [phone].    We comply with applicable federal civil rights laws and [place] laws. We do not discriminate on the basis of race, color, national origin, age, disability, sex, sexual orientation, or gender identity.            Thank you!     Thank you for choosing [facility] MEDICATION THERAPY MANAGEMENT  for your care. Our " goal is always to provide you with excellent care. Hearing back from our patients is one way we can continue to improve our services. Please take a few minutes to complete the written survey that you may receive in the mail after your visit with us. Thank you!             Your Updated Medication List - Protect others around you: Learn how to safely use, store and throw away your medicines at www.disposemymeds.org.          This list is accurate as of: 12/20/17  9:51 AM.  Always use your most recent med list.                   Brand Name Dispense Instructions for use Diagnosis    acetaminophen 500 MG tablet    TYLENOL     Take 500-1,000 mg by mouth every 6 hours as needed for mild pain        ADVIL 200 MG tablet   Generic drug:  ibuprofen      Take 200 mg by mouth every 4 hours as needed.        aspirin 81 MG tablet      Take 81 mg by mouth daily        blood glucose monitoring lancets     1 Box    Use to test blood sugar 2-3 times daily or as directed.  Ok to substitute alternative if insurance prefers.    Type 2 diabetes mellitus with diabetic neuropathy, without long-term current use of insulin (H)       blood glucose monitoring meter device kit     1 kit    Use to test blood sugar 2-3 times daily or as directed.  Ok to substitute alternative if insurance prefers.        blood glucose monitoring test strip    ACCU-CHEK CORTEZ PLUS    1 Box    Use to test blood sugar 2-3 times daily or as directed.  Ok to substitute alternative if insurance prefers.    Type 2 diabetes mellitus with diabetic neuropathy, without long-term current use of insulin (H)       calcium-vitamin D-vitamin K 500-500-40 MG-UNT-MCG Chew    VIACTIV     Take 1 tablet by mouth daily        FLUoxetine 10 MG capsule    PROzac    180 capsule    Take 2 capsules (20 mg) by mouth daily    Major depressive disorder, recurrent episode, mild (H)       glipiZIDE 10 MG 24 hr tablet    glipiZIDE XL    180 tablet    Take 2 tablets (20 mg) by mouth daily         insulin degludec-liraglutide pen    XULTOPHY 100/3.6    15 mL    Inject 16 Units Subcutaneous daily titrating as directed    Type 2 diabetes mellitus with diabetic neuropathy, unspecified long term insulin use status (H)       insulin pen needle 31G X 8 MM     90 each    Use with Victoza injections once daily.    Type 2 diabetes mellitus with other specified complication, with long-term current use of insulin (H)       metFORMIN 1000 MG tablet    GLUCOPHAGE    180 tablet    Take 1 tablet (1,000 mg) by mouth 2 times daily (with meals)    Type 2 diabetes mellitus with complication, without long-term current use of insulin (H)       MULTIVITAMIN WOMEN 50+ PO      Take 1 tablet by mouth daily        nitroGLYcerin 0.4 MG sublingual tablet    NITROSTAT    25 tablet    For chest pain place 1 tablet under the tongue every 5 minutes for 3 doses. If symptoms persist 5 minutes after 1st dose call 911.    Chest tightness or pressure       pravastatin 40 MG tablet    PRAVACHOL    180 tablet    Take 2 tablets (80 mg) by mouth daily (please dispense as 40mg tabs)    Hyperlipidemia LDL goal <100       vitamin D 2000 UNITS tablet      Take 1 tablet by mouth daily.

## 2018-01-05 ENCOUNTER — ALLIED HEALTH/NURSE VISIT (OUTPATIENT)
Dept: PHARMACY | Facility: CLINIC | Age: 73
End: 2018-01-05
Payer: COMMERCIAL

## 2018-01-05 DIAGNOSIS — R30.0 DYSURIA: ICD-10-CM

## 2018-01-05 DIAGNOSIS — E11.40 TYPE 2 DIABETES MELLITUS WITH DIABETIC NEUROPATHY, WITHOUT LONG-TERM CURRENT USE OF INSULIN (H): Primary | ICD-10-CM

## 2018-01-05 PROCEDURE — 99607 MTMS BY PHARM ADDL 15 MIN: CPT | Mod: U4 | Performed by: PHARMACIST

## 2018-01-05 PROCEDURE — 99605 MTMS BY PHARM NP 15 MIN: CPT | Mod: U4 | Performed by: PHARMACIST

## 2018-01-05 NOTE — LETTER
"     [facility] MEDICATION THERAPY MANAGEMENT     Date: 2018    Lindsay Nieves  [address] [place] [address]    Dear Ms. Nieves,    Thank you for talking with me on  about your health and medications. Medicare s MTM (Medication Therapy Management) program helps you understand your medications and use them safely.      This letter includes an action plan (Medication Action Plan) and medication list (Personal Medication List). The action plan has steps you should take to help you get the best results from your medications. The medication list will help you keep track of your medications and how to use them the right way.       Have your action plan and medication list with you when you talk with your doctors, pharmacists, and other healthcare providers in your care team.     Ask your doctors, pharmacists, and other healthcare providers to update the action plan and medication list at every visit.     Take your medication list with you if you go to the hospital or emergency room.     Give a copy of the action plan and medication list to your family or caregivers.     If you want to talk about this letter or any of the papers with it, please call   [phone].   We look forward to working with you, your doctors, and other healthcare providers to help you stay healthy.     Sincerely,    Cassandra Murdock      Enclosed: Medication Action Plan and Personal Medication List    MEDICATION ACTION PLAN FOR Lindsay Nieves,  1945     This action plan will help you get the best results from your medications if you:   1. Read \"What we talked about.\"   2. Take the steps listed in the \"What I need to do\" boxes.   3. Fill in \"What I did and when I did it.\"   4. Fill in \"My follow-up plan\" and \"Questions I want to ask.\"     Have this action plan with you when you talk with your doctors, pharmacists, and other healthcare providers in your care team. Share this with your family or caregivers " too.  DATE PREPARED: 2018  What we talked about: We talked about how your blood sugars can rise as a result of inflammation caused by your urinary tract infection                                                  What I need to do: Continue medications as prescribed, your blood sugars will return to normal as you recover What I did and when I did it:                                              My follow-up plan:                 Questions I want to ask:              If you have any questions about your action plan, call Cassandra Murdock, Phone: [phone] , Monday-Friday 8-4:30pm.           MEDICATION LIST FOR Lindsay Nieves,  1945     This medication list was made for you after we talked. We also used information from your doctor's chart.      Use blank rows to add new medications. Then fill in the dates you started using them.    Cross out medications when you no longer use them. Then write the date and why you stopped using them.    Ask your doctors, pharmacists, and other healthcare providers to update this list at every visit. Keep this list up-to-date with:       Prescription medications    Over the counter drugs     Herbals    Vitamins    Minerals      If you go to the hospital or emergency room, take this list with you. Share this with your family or caregivers too.     DATE PREPARED: 2018  Allergies or side effects: Review of patient's allergies indicates no known allergies.        Medication:  ACETAMINOPHEN 500 MG PO TABS      How I use it:  Take 500-1,000 mg by mouth every 6 hours as needed for mild pain      Why I use it:  Pain    Prescriber:  Patient Reported      Date I started using it:   NA    Date I stopped using it:   NA      Why I stopped using it:   NA         Medication:  ASPIRIN 81 MG PO TABS      How I use it:  Take 81 mg by mouth daily      Why I use it:  Stroke Prevention    Prescriber:  Patient Reported      Date I started using it:   NA    Date I stopped using it:   NA       Why I stopped using it:   NA         Medication:  CALCIUM-VITAMIN D-VITAMIN K 500-500-40 MG-UNT-MCG PO CHEW      How I use it:  Take 1 tablet by mouth daily      Why I use it:  General Health    Prescriber:  Patient Reported      Date I started using it:   NA    Date I stopped using it:   NA      Why I stopped using it:   NA         Medication:  FLUOXETINE HCL 10 MG PO CAPS      How I use it:  Take 2 capsules (20 mg) by mouth daily      Why I use it: Depression    Prescriber:  Jermain Connor MD      Date I started using it:   NA    Date I stopped using it:   NA      Why I stopped using it:   NA         Medication:  GLIPIZIDE ER 10 MG PO TB24      How I use it:  Take 2 tablets (20 mg) by mouth daily      Why I use it: Diabetes     Prescriber:  Maryam Clayton MD PhD      Date I started using it:   NA    Date I stopped using it:   NA      Why I stopped using it:   NA            Medication:  IBUPROFEN 200 MG PO TABS      How I use it:  Take 200 mg by mouth every 4 hours as needed.      Why I use it:  Pain    Prescriber:  Patient Reported      Date I started using it:   NA    Date I stopped using it:   NA      Why I stopped using it:   NA         Medication:  Xultophy 100-3.6 UNIT-MG/ML SC SOPN      How I use it:  Inject 16 Units Subcutaneous daily titrating as directed      Why I use it: Diabetes    Prescriber:  Maryam Clayton MD PhD      Date I started using it:   NA    Date I stopped using it:   NA      Why I stopped using it:   NA                Medication:  METFORMIN HCL 1000 MG PO TABS      How I use it:  Take 1 tablet (1,000 mg) by mouth 2 times daily (with meals)      Why I use it: Diabetes    Prescriber:  Maryam Clayton MD PhD      Date I started using it:   NA    Date I stopped using it:   NA      Why I stopped using it:   NA         Medication:  MULTIVITAMIN WOMEN 50+ PO      How I use it:  Take 1 tablet by mouth daily      Why I use it: General Health    Prescriber:  Patient Reported       Date I started using it:   NA    Date I stopped using it:   NA      Why I stopped using it:   NA         Medication:  NITROGLYCERIN 0.4 MG SL SUBL      How I use it:  For chest pain place 1 tablet under the tongue every 5 minutes for 3 doses. If symptoms persist 5 minutes after 1st dose call 911.      Why I use it: Chest tightness or pressure    Prescriber:  Maryam Clayton MD PhD      Date I started using it:   NA    Date I stopped using it:   NA      Why I stopped using it:   NA         Medication:  PRAVASTATIN SODIUM 40 MG PO TABS      How I use it:  Take 2 tablets (80 mg) by mouth daily (please dispense as 40mg tabs)      Why I use it: Cholesterol    Prescriber:  Maryam Clayton MD PhD      Date I started using it:  NA     Date I stopped using it:   NA      Why I stopped using it:   NA         Medication:  VITAMIN D 2000 UNITS PO TABS      How I use it:  Take 1 tablet by mouth daily.       Why I use it:  Low vitamin D    Prescriber:  Patient Reported      Date I started using it:   NA    Date I stopped using it:   NA      Why I stopped using it:   NA                 Other Information:     If you have any questions about your action plan, call [phone].    According to the Paperwork Reduction Act of 1995, no persons are required to respond to a collection of information unless it displays a valid OMB control number. The valid OMB number for this information collection is 4320-8887. The time required to complete this information collection is estimated to average 40 minutes per response, including the time to review instructions, searching existing data resources, gather the data needed, and complete and review the information collection. If you have any comments concerning the accuracy of the time estimate(s) or suggestions for improving this form, please write to: CMS, Attn: [employer] Reports Clearance Officer, [address] [place] [phone].

## 2018-01-05 NOTE — MR AVS SNAPSHOT
"              After Visit Summary   1/5/2018    Lindsay Nieves    MRN: [id number]           Patient Information     Date Of Birth          1945        Visit Information        Provider Department      1/5/2018 9:30 AM Cassandra Murdock RPH [facility] Medication Therapy Management        Today's Diagnoses     Type 2 diabetes mellitus with diabetic neuropathy, without long-term current use of insulin (H)    -  1    Dysuria           Follow-ups after your visit        Your next 10 appointments already scheduled     Jan 19, 2018  9:00 AM CST   TELEMEDICINE with Cassandra Murdock RPH   [facility] Medication Therapy Management ([email or web address])    [address]  4th Floor  [place] [id number] [phone]           Note: this is not an onsite visit; there is no need to come to the facility.              Who to contact     If you have questions or need follow up information about today's clinic visit or your schedule please contact [facility] MEDICATION THERAPY MANAGEMENT directly at [phone].  Normal or non-critical lab and imaging results will be communicated to you by [employer], letter or phone within 4 business days after the clinic has received the results. If you do not hear from us within 7 days, please contact the clinic through [employer] or phone. If you have a critical or abnormal lab result, we will notify you by phone as soon as possible.  Submit refill requests through [employer] or call your pharmacy and they will forward the refill request to us. Please allow 3 business days for your refill to be completed.          Additional Information About Your Visit        Better Bean Information     [employer] lets you send messages to your doctor, view your test results, renew your prescriptions, schedule appointments and more. To sign up, go to [email or web address] . Click on \"Log in\" on the left side of the screen, which will take you to the Welcome page. Then click on \"Sign up " "Now\" on the right side of the page.     You will be asked to enter the access code listed below, as well as some personal information. Please follow the directions to create your username and password.     Your access code is: [email or web address]  Expires: 2018  6:30 AM     Your access code will  in 90 days. If you need help or a new code, please call your [place] clinic or [phone].        Care EveryWhere ID     This is your Care EveryWhere ID. This could be used by other organizations to access your [place] medical records  [id number]         Blood Pressure from Last 3 Encounters:   17 123/67   17 120/75   17 118/61    Weight from Last 3 Encounters:   17 195 lb (88.5 kg)   16 215 lb (97.5 kg)   16 215 lb (97.5 kg)              Today, you had the following     No orders found for display       Primary Care Provider Office Phone # Fax #    Maryam Clayton MD PhD [phone]       [address]  [place] [address]        Equal Access to Services     [facility] : Hadii adrián kuoo Solele, waaxda luqadaha, qaybta kaalmada aderosmeryyada, cullen chauhan. So [facility] [phone].    ATENCIÓN: Si habla español, tiene a resendiz disposición servicios gratuitos de asistencia lingüística. [facility] [phone].    We comply with applicable federal civil rights laws and [place] laws. We do not discriminate on the basis of race, color, national origin, age, disability, sex, sexual orientation, or gender identity.            Thank you!     Thank you for choosing [facility] MEDICATION THERAPY MANAGEMENT  for your care. Our goal is always to provide you with excellent care. Hearing back from our patients is one way we can continue to improve our services. Please take a few minutes to complete the written survey that you may receive in the mail after your visit with us. Thank you!             Your Updated Medication List - Protect " others around you: Learn how to safely use, store and throw away your medicines at www.disposemymeds.org.          This list is accurate as of: 1/5/18  9:51 AM.  Always use your most recent med list.                   Brand Name Dispense Instructions for use Diagnosis    acetaminophen 500 MG tablet    TYLENOL     Take 500-1,000 mg by mouth every 6 hours as needed for mild pain        ADVIL 200 MG tablet   Generic drug:  ibuprofen      Take 200 mg by mouth every 4 hours as needed.        aspirin 81 MG tablet      Take 81 mg by mouth daily        blood glucose monitoring lancets     1 Box    Use to test blood sugar 2-3 times daily or as directed.  Ok to substitute alternative if insurance prefers.    Type 2 diabetes mellitus with diabetic neuropathy, without long-term current use of insulin (H)       blood glucose monitoring meter device kit     1 kit    Use to test blood sugar 2-3 times daily or as directed.  Ok to substitute alternative if insurance prefers.        blood glucose monitoring test strip    ACCU-CHEK CORTEZ PLUS    1 Box    Use to test blood sugar 2-3 times daily or as directed.  Ok to substitute alternative if insurance prefers.    Type 2 diabetes mellitus with diabetic neuropathy, without long-term current use of insulin (H)       calcium-vitamin D-vitamin K 500-500-40 MG-UNT-MCG Chew    VIACTIV     Take 1 tablet by mouth daily        FLUoxetine 10 MG capsule    PROzac    180 capsule    Take 2 capsules (20 mg) by mouth daily    Major depressive disorder, recurrent episode, mild (H)       glipiZIDE 10 MG 24 hr tablet    glipiZIDE XL    180 tablet    Take 2 tablets (20 mg) by mouth daily        insulin degludec-liraglutide pen    XULTOPHY 100/3.6    15 mL    Inject 16 Units Subcutaneous daily titrating as directed    Type 2 diabetes mellitus with diabetic neuropathy, unspecified long term insulin use status (H)       insulin pen needle 31G X 8 MM     90 each    Use with Victoza injections once daily.     Type 2 diabetes mellitus with other specified complication, with long-term current use of insulin (H)       metFORMIN 1000 MG tablet    GLUCOPHAGE    180 tablet    Take 1 tablet (1,000 mg) by mouth 2 times daily (with meals)    Type 2 diabetes mellitus with complication, without long-term current use of insulin (H)       MULTIVITAMIN WOMEN 50+ PO      Take 1 tablet by mouth daily        nitroGLYcerin 0.4 MG sublingual tablet    NITROSTAT    25 tablet    For chest pain place 1 tablet under the tongue every 5 minutes for 3 doses. If symptoms persist 5 minutes after 1st dose call 911.    Chest tightness or pressure       pravastatin 40 MG tablet    PRAVACHOL    180 tablet    Take 2 tablets (80 mg) by mouth daily (please dispense as 40mg tabs)    Hyperlipidemia LDL goal <100       vitamin D 2000 UNITS tablet      Take 1 tablet by mouth daily.

## 2018-01-05 NOTE — PROGRESS NOTES
SUBJECTIVE/OBJECTIVE:                Lindsay Nieves is a 72 year old female called for a follow-up visit for Medication Therapy Management.     Chief Complaint: Follow up from our visit on 17.  We are following up on diabetes today.     Tobacco: No tobacco use  Alcohol: Less than 1 beverages / week    Medication Adherence: no issues reported    Diabetes:  Pt currently taking Xultophy 16 units daily, metformin 1000 mg twice daily, glipizide XL 10 mg twice daily. Pt is not experiencing side effects.  SMBG: two times daily.   Ranges (patient reported):     FP, 169 mg/dL  HS: 179, 264 mg/dL     She is ill with a UTI right now.  She is using cefuroxime because other agents were not appropriate for use. She finds that her blood sugars are starting to return to normal and has not seen a sugar high than the 264 listed above. She is uncertain whether this is related to her infection or if she just needs to use more medication. We discuss the relationship between illness/infection and hyperglycemia today.     Patient is not experiencing hypoglycemia  Recent symptoms of high blood sugar? none  Eye exam: up to date  Foot exam: up to date  Microalbumin is < 30 mg/g. Pt is not taking an ACEi/ARB d/t lack of hypertension and proteinuria.  Aspirin: Taking 81mg daily and denies side effects    Current UTI: current medications include cefuroxime twice daily (dose unknown) prescribed by an outside urgent care.  She is feeling much better and denies side effects.  The importance of taking medications as directed until gone is reiterated.     Current labs include:  BP Readings from Last 3 Encounters:   17 123/67   17 120/75   17 118/61     Today's Vitals: There were no vitals taken for this visit. - telemed    Lab Results   Component Value Date    A1C 8.4 2017    A1C 9.5 2016    A1C 7.5 2016    A1C 8.7 2016    A1C 7.8 2015     Recent Labs   Lab Test  16   1027   09/02/15   0909  07/17/14   0900   CHOL  153  168  189   HDL  63  60  49*   LDL  57  72  93   TRIG  164*  177*  232*   CHOLHDLRATIO   --   2.8  3.8     Liver Function Studies -   Recent Labs   Lab Test  02/25/13   0826   PROTTOTAL  7.2   ALBUMIN  4.0   BILITOTAL  0.5   ALKPHOS  74   AST  20   ALT  30     Lab Results   Component Value Date    UCRR 25 05/12/2017    MICROL <5 05/12/2017    UMALCR Unable to calculate due to low value 05/12/2017     Last Basic Metabolic Panel:  Lab Results   Component Value Date     02/08/2017      Lab Results   Component Value Date    POTASSIUM 4.1 02/08/2017     Lab Results   Component Value Date    CHLORIDE 105 02/08/2017     Lab Results   Component Value Date    BUN 11 02/08/2017     Lab Results   Component Value Date    CR 0.64 02/08/2017     GFR Estimate   Date Value Ref Range Status   02/08/2017 >90  Non  GFR Calc   >60 mL/min/1.7m2 Final   04/27/2016 >90  Non  GFR Calc   >60 mL/min/1.7m2 Final   09/02/2015 >90  Non  GFR Calc   >60 mL/min/1.7m2 Final     TSH   Date Value Ref Range Status   05/12/2017 2.38 0.40 - 4.00 mU/L Final     Most Recent Immunizations   Administered Date(s) Administered     HepB 09/14/2016     Influenza (High Dose) 3 valent vaccine 10/04/2017     Influenza (IIV3) PF 10/03/2012     Pneumo Conj 13-V (2010&after) 11/03/2014     Pneumococcal (PCV 7) 04/12/2006     Pneumococcal 23 valent 02/25/2013     TDAP Vaccine (Boostrix) 02/25/2013     Tetanus 09/19/2005     Zoster vaccine, live 05/18/2009       ASSESSMENT:              Current medications were reviewed today as discussed above.  Medicare Part D topics discussed:Medications reviewed-no issues identified or changes needed    Medication Adherence: no issues identified    Diabetes: Needs Improvement. Patient is not meeting A1c goal of < 7%. Self monitoring of blood glucose is not at goal of fasting  mg/dL and post prandial < 180 mg/dL. Recent  hyperglycemia is most likely related to UTI. No changes are warranted today.     Current UTI: Improving.  Continue medications until gone.      PLAN:                  1) continue ABX until gone.  Sugars will improve as infection clears.     I spent 15 minutes with this patient today. An additional 15 minutes was spent creating a medication action plan today. All changes were made via collaborative practice agreement with Maryam Clayton. A copy of the visit note was provided to the patient's primary care provider.     Will follow up in 2 weeks.    The patient declined a summary of these recommendations as an after visit summary.    Cassandra Murdock, Pharm.D., [facility]  Medication Therapy Management Pharmacist  Page/VM:  [phone]

## 2018-01-12 ENCOUNTER — TELEPHONE (OUTPATIENT)
Dept: PHARMACY | Facility: CLINIC | Age: 73
End: 2018-01-12

## 2018-01-12 NOTE — TELEPHONE ENCOUNTER
I return Lindsay's call today regarding her recent illness. She is feeling unwell again and wonders if she can take Xultophy and Zithromax together. She also wonders if she can drink while she is taking Xultophy.  We discuss that she can take Zithromax and Xultophy together and what she can use for cough and cold remedies.  She is advised to avoid added sugar in any cough syrups.  She is also advised that using alcohol and insulin together can increase the risk for hypoglycemia. However, an alcoholic beverage on occasion is reasonable.    Cassandra Murdock, Pharm.D., [facility]  Medication Therapy Management Pharmacist  Page/VM:  [phone]

## 2018-01-19 ENCOUNTER — TELEPHONE (OUTPATIENT)
Dept: INTERNAL MEDICINE | Facility: CLINIC | Age: 73
End: 2018-01-19

## 2018-01-19 ENCOUNTER — ALLIED HEALTH/NURSE VISIT (OUTPATIENT)
Dept: PHARMACY | Facility: CLINIC | Age: 73
End: 2018-01-19
Payer: COMMERCIAL

## 2018-01-19 DIAGNOSIS — R30.0 DYSURIA: ICD-10-CM

## 2018-01-19 DIAGNOSIS — E11.40 TYPE 2 DIABETES MELLITUS WITH DIABETIC NEUROPATHY, WITHOUT LONG-TERM CURRENT USE OF INSULIN (H): Primary | ICD-10-CM

## 2018-01-19 PROCEDURE — 99607 MTMS BY PHARM ADDL 15 MIN: CPT | Mod: U4 | Performed by: PHARMACIST

## 2018-01-19 PROCEDURE — 99606 MTMS BY PHARM EST 15 MIN: CPT | Mod: U4 | Performed by: PHARMACIST

## 2018-01-19 NOTE — PROGRESS NOTES
SUBJECTIVE/OBJECTIVE:                Lindsay Nieves is a 72 year old female called for a follow-up visit for Medication Therapy Management.  She was referred to me from Dr. Clayton. She is still feeling ill again today.  She feels like she has a cold or the flu again and has been in bed most days for the last week.  She also has symptoms that her UTI has returned.     Chief Complaint: Follow up from our visit on 18.  We are following up on diabetes today.     Tobacco: No tobacco use  Alcohol: not currently using    Medication Adherence: no issues reported    Diabetes:  Pt currently taking Soliqua 16 units daily, glipizide XL 20 mg daily and metformin 1000 mg twice daily. Pt is not experiencing side effects.  Patient is frustrated with her blood sugars.  We discuss the effects infection and illness have on blood glucose.   SMBG: two times daily.   Ranges (from patient's glucose log):     FB, 170, 153, 87, 215  HS: 143, 197, 150, 213    Patient is not experiencing hypoglycemia  Recent symptoms of high blood sugar? none  Eye exam: up to date  Foot exam: up to date  Microalbumin is < 30 mg/g. Pt is not taking an ACEi/ARB.  Aspirin: Taking 81mg daily and denies side effects    UTI: Not taking current medications right now.  Finished a cefuroxime which was given to her by the minute clinic at [facility].  The PA at urgent care felt she was being treated appropriately with cefuroxime. She wonders if she can use AZO throughout the weekend to feel more comfortable.  She know the package information limits her to 2 days of use. She lets me know that she has a history with a klebsiella UTI and this recurrence is making her nervous.     Current labs include:BP Readings from Last 3 Encounters:   17 123/67   17 120/75   17 118/61     Today's Vitals: There were no vitals taken for this visit. - telemed    Lab Results   Component Value Date    A1C 8.4 2017    A1C 9.5 2016    A1C 7.5  04/27/2016    A1C 8.7 01/25/2016    A1C 7.8 09/02/2015     Recent Labs   Lab Test  12/29/16   1027  09/02/15   0909  07/17/14   0900   CHOL  153  168  189   HDL  63  60  49*   LDL  57  72  93   TRIG  164*  177*  232*   CHOLHDLRATIO   --   2.8  3.8     Liver Function Studies -   Recent Labs   Lab Test  02/25/13   0826   PROTTOTAL  7.2   ALBUMIN  4.0   BILITOTAL  0.5   ALKPHOS  74   AST  20   ALT  30       Lab Results   Component Value Date    UCRR 25 05/12/2017    MICROL <5 05/12/2017    UMALCR Unable to calculate due to low value 05/12/2017       Last Basic Metabolic Panel:  Lab Results   Component Value Date     02/08/2017      Lab Results   Component Value Date    POTASSIUM 4.1 02/08/2017     Lab Results   Component Value Date    CHLORIDE 105 02/08/2017     Lab Results   Component Value Date    BUN 11 02/08/2017     Lab Results   Component Value Date    CR 0.64 02/08/2017     GFR Estimate   Date Value Ref Range Status   02/08/2017 >90  Non  GFR Calc   >60 mL/min/1.7m2 Final   04/27/2016 >90  Non  GFR Calc   >60 mL/min/1.7m2 Final   09/02/2015 >90  Non  GFR Calc   >60 mL/min/1.7m2 Final     TSH   Date Value Ref Range Status   05/12/2017 2.38 0.40 - 4.00 mU/L Final     Most Recent Immunizations   Administered Date(s) Administered     HepB 09/14/2016     Influenza (High Dose) 3 valent vaccine 10/04/2017     Influenza (IIV3) PF 10/03/2012     Pneumo Conj 13-V (2010&after) 11/03/2014     Pneumococcal (PCV 7) 04/12/2006     Pneumococcal 23 valent 02/25/2013     TDAP Vaccine (Boostrix) 02/25/2013     Tetanus 09/19/2005     Zoster vaccine, live 05/18/2009     ASSESSMENT:              Current medications were reviewed today as discussed above.      Medication Adherence: no issues identified    Diabetes: Unimproved. Patient is not meeting A1c goal of < 8%. Patient has been ill for several weeks which is likely increasing her daily blood sugars.  Defer changes until she  recovers.     UTI: Needs improvement.  Patient is encouraged to be seen within her PCP's practice as soon as possible.     PLAN:                  1) Please get in touch with triage and attempt to get seen today.    2) You may use AZO tablets for comfort until you get seen and treated for UTI.     I spent 20 minutes with this patient today. All changes were made via collaborative practice agreement with Maryam Clayton. A copy of the visit note was provided to the patient's primary care provider.     Will follow up in 2 weeks.    The patient declined a summary of these recommendations as an after visit summary.    Cassandra Murdock, Pharm.D., BCACP  Medication Therapy Management Pharmacist  Page/VM:  [phone]

## 2018-01-19 NOTE — MR AVS SNAPSHOT
After Visit Summary   1/19/2018    Lindsay Nieves    MRN: [id number]           Patient Information     Date Of Birth          1945        Visit Information        Provider Department      1/19/2018 9:00 AM [facility]        Today's Diagnoses     Type 2 diabetes mellitus with diabetic neuropathy, without long-term current use of insulin (H)    -  1    Dysuria           Follow-ups after your visit        Your next 10 appointments already scheduled     Jan 24, 2018 12:00 PM CST   (Arrive by 11:45 AM)   Return Visit with Maryam Clayton MD PhD   [facility] ([facility])    [address] [place] [address]  4th [facility] [id number] [phone]            Feb 02, 2018  9:30 AM CST   TELEMEDICINE with [facility] ([facility])    [address] [place] [id number] [phone]           Note: this is not an onsite visit; there is no need to come to the facility.            Feb 12, 2018 11:30 AM CST   DX HIP/PELVIS/SPINE with UCDX1   [facility] ([facility])    [address] [place] [id number] [phone]           Please do not take any of the following 24 hours prior to the day of your exam: vitamins, calcium tablets, antacids.  If possible, please wear clothes without metal (snaps, zippers). A sweatsuit works well.            Feb 12, 2018 12:00 PM CST   (Arrive by 11:45 AM)   MA SCREENING DIGITAL BILATERAL with [id number]   [facility] ([facility])    [address] [place] [address]  2nd [facility] [phone]           Do not use any powder, lotion or deodorant under your arms or on your breast. If you do, we will ask you to remove it before your exam.  Wear comfortable,  "two-piece clothing.  If you have any allergies, tell your care team.  Bring any previous mammograms from other facilities or have them mailed to the breast center. Three-dimensional (3D) mammograms are available at [facility] locations in [facility], [place], [place], and [place]. [facility] locations include [place] [facility] and [facility] in [place]. Benefits of 3D mammograms include: - Improved rate of cancer detection - Decreases your chance of having to go back for more tests, which means fewer: - \"False-positive\" results (This means that there is an abnormal area but it isn't cancer.) - Invasive testing procedures, such as a biopsy or surgery - Can provide clearer images of the breast if you have dense breast tissue. 3D mammography is an optional exam that anyone can have with a 2D mammogram. It doesn't replace or take the place of a 2D mammogram. 2D mammograms remain an effective screening test for all women.  Not all insurance companies cover the cost of a 3D mammogram. Check with your insurance.              Who to contact     If you have questions or need follow up information about today's clinic visit or your schedule please contact [facility]ER[facility]NAG[facility] directly at [phone].  Normal or non-critical lab and imaging results will be communicated to you by [employer], letter or phone within 4 business days after the clinic has received the results. If you do not hear from us within 7 days, please contact the clinic through [employer] or phone. If you have a critical or abnormal lab result, we will notify you by phone as soon as possible.  Submit refill requests through [employer] or call your pharmacy and they will forward the refill request to us. Please allow 3 business days for your refill to be completed.          Additional Information About Your Visit        [employer] Information     [employer] lets you send messages to your doctor, view your " "test results, renew your prescriptions, schedule appointments and more. To sign up, go to [email or web address] . Click on \"Log in\" on the left side of the screen, which will take you to the Welcome page. Then click on \"Sign up Now\" on the right side of the page.     You will be asked to enter the access code listed below, as well as some personal information. Please follow the directions to create your username and password.     Your access code is: [email or web address]  Expires: 2018  6:30 AM     Your access code will  in 90 days. If you need help or a new code, please call your [facility] clinic or [phone].        Care EveryWhere ID     This is your Care EveryWhere ID. This could be used by other organizations to access your [facility] medical records  [id number]         Blood Pressure from Last 3 Encounters:   17 123/67   17 120/75   17 118/61    Weight from Last 3 Encounters:   17 195 lb (88.5 kg)   16 215 lb (97.5 kg)   16 215 lb (97.5 kg)              Today, you had the following     No orders found for display       Primary Care Provider Office Phone # Fax #    Maryam Clayton MD PhD [phone]       [address] [place], [place] [id number]        Equal Access to Services     BRIE AHMADI AH: Hadii aad ku hadasho Soomaali, waaxda luqadaha, qaybta kaalmada adeegyada, cullen chauhan. So [facility] [phone].    ATENCIÓN: Si habla español, tiene a resendiz disposición servicios gratuitos de asistencia lingüística. Sami al [phone].    We comply with applicable federal civil rights laws and [place] laws. We do not discriminate on the basis of race, color, national origin, age, disability, sex, sexual orientation, or gender identity.            Thank you!     Thank you for choosing [facility] MEDICATION THERAPY MANAGEMENT  for your care. Our goal is always to provide you with excellent care. Hearing back from our patients is " one way we can continue to improve our services. Please take a few minutes to complete the written survey that you may receive in the mail after your visit with us. Thank you!             Your Updated Medication List - Protect others around you: Learn how to safely use, store and throw away your medicines at www.disposemymeds.org.          This list is accurate as of: 1/19/18 10:35 AM.  Always use your most recent med list.                   Brand Name Dispense Instructions for use Diagnosis    acetaminophen 500 MG tablet    TYLENOL     Take 500-1,000 mg by mouth every 6 hours as needed for mild pain        ADVIL 200 MG tablet   Generic drug:  ibuprofen      Take 200 mg by mouth every 4 hours as needed.        aspirin 81 MG tablet      Take 81 mg by mouth daily        blood glucose monitoring lancets     1 Box    Use to test blood sugar 2-3 times daily or as directed.  Ok to substitute alternative if insurance prefers.    Type 2 diabetes mellitus with diabetic neuropathy, without long-term current use of insulin (H)       blood glucose monitoring meter device kit     1 kit    Use to test blood sugar 2-3 times daily or as directed.  Ok to substitute alternative if insurance prefers.        blood glucose monitoring test strip    ACCU-CHEK CORTEZ PLUS    1 Box    Use to test blood sugar 2-3 times daily or as directed.  Ok to substitute alternative if insurance prefers.    Type 2 diabetes mellitus with diabetic neuropathy, without long-term current use of insulin (H)       calcium-vitamin D-vitamin K 500-500-40 MG-UNT-MCG Chew    VIACTIV     Take 1 tablet by mouth daily        FLUoxetine 10 MG capsule    PROzac    180 capsule    Take 2 capsules (20 mg) by mouth daily    Major depressive disorder, recurrent episode, mild (H)       glipiZIDE 10 MG 24 hr tablet    glipiZIDE XL    180 tablet    Take 2 tablets (20 mg) by mouth daily        insulin degludec-liraglutide pen    XULTOPHY 100/3.6    15 mL    Inject 16 Units  Subcutaneous daily titrating as directed    Type 2 diabetes mellitus with diabetic neuropathy, unspecified long term insulin use status (H)       insulin pen needle 31G X 8 MM     90 each    Use with Victoza injections once daily.    Type 2 diabetes mellitus with other specified complication, with long-term current use of insulin (H)       metFORMIN 1000 MG tablet    GLUCOPHAGE    180 tablet    Take 1 tablet (1,000 mg) by mouth 2 times daily (with meals)    Type 2 diabetes mellitus with complication, without long-term current use of insulin (H)       MULTIVITAMIN WOMEN 50+ PO      Take 1 tablet by mouth daily        nitroGLYcerin 0.4 MG sublingual tablet    NITROSTAT    25 tablet    For chest pain place 1 tablet under the tongue every 5 minutes for 3 doses. If symptoms persist 5 minutes after 1st dose call 911.    Chest tightness or pressure       pravastatin 40 MG tablet    PRAVACHOL    180 tablet    Take 2 tablets (80 mg) by mouth daily (please dispense as 40mg tabs)    Hyperlipidemia LDL goal <100       vitamin D 2000 UNITS tablet      Take 1 tablet by mouth daily.

## 2018-01-19 NOTE — TELEPHONE ENCOUNTER
----- Message from Albert Menard sent at 1/19/2018  2:57 PM CST -----  Regarding: UTI/ Cold/ Flu  Contact: [phone]  Jermain (spouse) called.     He reports the patient continues to not feel any better, and still have UTI symptoms. In addition to that she is starting to have a cold and flu like symptoms. He is concerned about her and wants to talk with a nurse to see if patient can be seen sooner than her Wednesday appt. Wants a call back before the end of the day.      ----------------  Spoke with pt twice today , 1st one was at 11 am.  Pt said she had UTI sx's between x-mas and new year and was seen in the urgent care and was treated with antibiotics.Pt said for the last few days the sx's are back and requesting to schedule an appt to see her primary today. not in clinic today and no available appt with any other provider in clinic today.  Offered to check to see if  has any opening at the [facility], but pt declined and appt scheduled for Wednesday with .Pt also said she has cold sx's and taken azithromycin ('s Rx)  Advised pt to go to the urgent care /ED if sx's get worse, pt agreed.  Pt called back at 2.57 pm and requesting to be seen today since her sx's are not  getting better.  No available appt this afternoon, appt scheduled with Karin Chandler on Monday , advised pt again to go to urgent care or ED if sx's get worse or no improvement. Pt is very upset that the clinic didn't;t have any available appt today.  Catrina Quevedo RN

## 2018-01-24 ENCOUNTER — OFFICE VISIT (OUTPATIENT)
Dept: FAMILY MEDICINE | Facility: CLINIC | Age: 73
End: 2018-01-24
Payer: COMMERCIAL

## 2018-01-24 VITALS — SYSTOLIC BLOOD PRESSURE: 130 MMHG | DIASTOLIC BLOOD PRESSURE: 80 MMHG | HEART RATE: 87 BPM

## 2018-01-24 DIAGNOSIS — R30.0 DYSURIA: Primary | ICD-10-CM

## 2018-01-24 DIAGNOSIS — N39.0 RECURRENT UTI: ICD-10-CM

## 2018-01-24 DIAGNOSIS — E11.40 TYPE 2 DIABETES MELLITUS WITH DIABETIC NEUROPATHY, UNSPECIFIED LONG TERM INSULIN USE STATUS: ICD-10-CM

## 2018-01-24 DIAGNOSIS — R35.0 INCREASED FREQUENCY OF URINATION: ICD-10-CM

## 2018-01-24 LAB
ALBUMIN UR-MCNC: NEGATIVE MG/DL
APPEARANCE UR: ABNORMAL
BACTERIA #/AREA URNS HPF: ABNORMAL /HPF
BILIRUB UR QL STRIP: NEGATIVE
COLOR UR AUTO: YELLOW
GLUCOSE UR STRIP-MCNC: NEGATIVE MG/DL
HGB UR QL STRIP: ABNORMAL
KETONES UR STRIP-MCNC: NEGATIVE MG/DL
LEUKOCYTE ESTERASE UR QL STRIP: ABNORMAL
MUCOUS THREADS #/AREA URNS LPF: PRESENT /LPF
NITRATE UR QL: POSITIVE
PH UR STRIP: 6 PH (ref 5–7)
RBC #/AREA URNS AUTO: 2 /HPF (ref 0–2)
SOURCE: ABNORMAL
SP GR UR STRIP: 1 (ref 1–1.03)
UROBILINOGEN UR STRIP-MCNC: 0 MG/DL (ref 0–2)
WBC #/AREA URNS AUTO: 164 /HPF (ref 0–2)

## 2018-01-24 RX ORDER — CIPROFLOXACIN 250 MG/1
250 TABLET, FILM COATED ORAL 2 TIMES DAILY
Qty: 20 TABLET | Refills: 1 | Status: SHIPPED | OUTPATIENT
Start: 2018-01-24 | End: 2018-03-02

## 2018-01-24 NOTE — NURSING NOTE
Chief Complaint   Patient presents with     UTI     Patient here for UTI.       Koko Fermin CMA at 12:04 PM on 1/24/2018.

## 2018-01-24 NOTE — PROGRESS NOTES
"[facility]   Maryam Clayton MD PhD  1/24/2018     Chief Complaint:   UTI (Patient here for UTI. )       History of Present Illness:   Lindsay Nieves is a 72 year old female with a history of type II diabetes and recurring UTI's who presents for evaluation of UTI. She began experiencing urinary urgency and pain when urinating on 12/26/2017; her urine was malodorous and cloudy as well. On the 29th of December, she went to urgent care and was prescribed cefuroxime 500 mg twice daily for 7 days, which she completed. They cultured her urine and she was told her culture results were positive for E.coli. Her medication were not changed but she was advised to follow up with her primary care provider if symptoms had not improved. She reports that after treatment her abdominal pain and urinary symptoms resolved completely for a few days, but her urine never \"cleared up.\" Two weeks ago, she had a cold and her urinary symptoms returned and became drastically worse. While she was having cold symptoms, she also had a fever of 101, but has not had a fever since. She is concerned that this UTI is effecting her blood sugars which have been elevated in the 200-300's. Within the last week she did have 1-2 days of low back pain, but this has since resolved. She denies any hematuria. She last had a UTI four years ago, which was successfully treated with Cipro.     Other concerns discussed:  Medication list was reviewed and updated as needed.      Review of Systems:   Pertinent items are noted in HPI.  All other systems are negative.    Active Medications:     Current Outpatient Prescriptions:      insulin degludec-liraglutide (XULTOPHY 100/3.6) pen, Inject 16 Units Subcutaneous daily titrating as directed, Disp: 15 mL, Rfl: 1     metFORMIN (GLUCOPHAGE) 1000 MG tablet, Take 1 tablet (1,000 mg) by mouth 2 times daily (with meals), Disp: 180 tablet, Rfl: 3     glipiZIDE (GLIPIZIDE XL) 10 MG 24 hr tablet, Take 2 " tablets (20 mg) by mouth daily, Disp: 180 tablet, Rfl: 3     pravastatin (PRAVACHOL) 40 MG tablet, Take 2 tablets (80 mg) by mouth daily (please dispense as 40mg tabs), Disp: 180 tablet, Rfl: 3     insulin pen needle 31G X 8 MM, Use with Victoza injections once daily., Disp: 90 each, Rfl: 3     FLUoxetine (PROZAC) 10 MG capsule, Take 2 capsules (20 mg) by mouth daily, Disp: 180 capsule, Rfl: 3     blood glucose monitoring (ACCU-CHEK CORTEZ PLUS) test strip, Use to test blood sugar 2-3 times daily or as directed.  Ok to substitute alternative if insurance prefers., Disp: 1 Box, Rfl: prn     blood glucose monitoring (SOFTCLIX) lancets, Use to test blood sugar 2-3 times daily or as directed.  Ok to substitute alternative if insurance prefers., Disp: 1 Box, Rfl: prn     nitroglycerin (NITROSTAT) 0.4 MG sublingual tablet, For chest pain place 1 tablet under the tongue every 5 minutes for 3 doses. If symptoms persist 5 minutes after 1st dose call 911., Disp: 25 tablet, Rfl: 0     acetaminophen (TYLENOL) 500 MG tablet, Take 500-1,000 mg by mouth every 6 hours as needed for mild pain, Disp: , Rfl:      Multiple Vitamins-Minerals (MULTIVITAMIN WOMEN 50+ PO), Take 1 tablet by mouth daily, Disp: , Rfl:      blood glucose monitoring (ACCU-CHEK CORTEZ PLUS) meter device kit, Use to test blood sugar 2-3 times daily or as directed.  Ok to substitute alternative if insurance prefers., Disp: 1 kit, Rfl: 0     aspirin 81 MG tablet, Take 81 mg by mouth daily, Disp: , Rfl:      Cholecalciferol (VITAMIN D) 2000 UNITS tablet, Take 1 tablet by mouth daily. , Disp: , Rfl:      calcium-vitamin D-vitamin K (VIACTIV) 500-500-40 MG-UNT-MCG CHEW, Take 1 tablet by mouth daily, Disp: , Rfl:      ibuprofen (ADVIL) 200 MG tablet, Take 200 mg by mouth every 4 hours as needed., Disp: , Rfl:       Allergies:   Review of patient's allergies indicates no known allergies.      Past Medical History:  Arthritis  Colon polyps  Type 2 diabetes mellitus with  diabetic neuropathy, unspecified long term insulin use status  Hypercholesterolemia, LDL goal <100  Major depressive disorder  Scoliosis  Spinal stenosis  Acne rosacea  Disorder of bone and cartilage  Obesity  Cataracts, bilateral  Vitreous degeneration  Advance care planning     Past Surgical History:  Release trigger finger, left  Tonsillectomy and adenoidectomy     Family History:   Mother - diabetes, cerebrovascular disease, Leiden blood disorder  Father - cardiovascular  Maternal grandmother - basal cell carcinoma  Brother - Leiden disorders, skin cancer, prostate cancer, basal cell carcinoma      Social History:   Marital Status:   Presents to the clinic alone.  Tobacco Use: former smoker, 9/20/2000  Alcohol Use: 1 drink/week or 1 drink/mo  PCP: Maryam Clayton      Physical Exam:   /80  Pulse 87  Breastfeeding? No       Gen:  older female in NAD  Neck  Supple.  Thyroid not palpable, no nodes    CVS exam: S1, S2 normal, no murmur, click, rub or gallop, regular rate and rhythm, chest is clear without rales or wheezing, no pedal edema,  Respiratory: Normal - Clear to auscultation without rales, rhonchi, or wheezing.  Abd Soft ND NT  BS active and mild suprapubic tenderness  No masses or HSM    Clinic Visit Plan:  Laboratory:  Component      Latest Ref Rng & Units 1/24/2018   Color Urine       Yellow   Appearance Urine       Cloudy   Glucose Urine      NEG:Negative mg/dL Negative   Bilirubin Urine      NEG:Negative Negative   Ketones Urine      NEG:Negative mg/dL Negative   Specific Gravity Urine      1.003 - 1.035 1.005   Blood Urine      NEG:Negative Moderate (A)   pH Urine      5.0 - 7.0 pH 6.0   Protein Albumin Urine      NEG:Negative mg/dL Negative   Urobilinogen mg/dL      0.0 - 2.0 mg/dL 0.0   Nitrite Urine      NEG:Negative Positive (A)   Leukocyte Esterase Urine      NEG:Negative Large (A)   Source       Clean catch urine   WBC Urine      0 - 2 / (H)   RBC Urine      0 - 2 /HPF 2    Bacteria Urine      NEG:Negative /HPF Moderate (A)   Mucous Urine      NEG:Negative /LPF Present (A)     Assessment and Plan:  Dysuria  Urinalysis is positive for a recurrent UTI today.   - UA with Micro reflex to Culture  - UA with Microscopic reflex to Culture    Increased frequency of urination  Urine culture is pending  - UA with Micro reflex to Culture  Started on cipro 250 bid for 10 days     Type 2 diabetes mellitus with diabetic neuropathy, unspecified long term insulin use status (H)  Patient will be contacted with laboratory results. No medication changes made today.  - CREATININE   - HEMOGLOBIN A1C - with other blood work  - Lipid panel reflex to direct LDL Fasting    Recurrent UTI  Prescription provided for ciprofloxacin 250 mg twice daily x 10 days.   - ciprofloxacin (CIPRO) 250 MG tablet  Dispense: 20 tablet; Refill: 1        Follow-up: 3 month         Scribe Disclosure:   We, April Rick and Martin Huitron, are serving as scribes to document services personally performed by Maryam Clayton MD PhD at this visit, based upon the provider's statements to me. All documentation has been reviewed by the aforementioned provider prior to being entered into the official medical record.

## 2018-01-24 NOTE — MR AVS SNAPSHOT
After Visit Summary   1/24/2018    Lindsay Nieves    MRN: [id number]           Patient Information     Date Of Birth          1945        Visit Information        Provider Department      1/24/2018 12:00 PM Maryam Clayton MD PhD [facility]        Today's Diagnoses     Dysuria    -  1    Increased frequency of urination        Type 2 diabetes mellitus with diabetic neuropathy, unspecified long term insulin use status (H)        Recurrent UTI           Follow-ups after your visit        Your next 10 appointments already scheduled     Feb 02, 2018  9:30 AM CST   TELEMEDICINE with Cassandra Murdock [facility]ication Therapy Management ([facility])    [address] [place] [address]  4th Floor  [facility] [id number] [phone]           Note: this is not an onsite visit; there is no need to come to the facility.            Feb 12, 2018 11:30 AM CST   DX HIP/PELVIS/SPINE with UCDX1   [facility] ([facility])    [address] [place] [address]  1st Floor  [facility] [id number] [phone]           Please do not take any of the following 24 hours prior to the day of your exam: vitamins, calcium tablets, antacids.  If possible, please wear clothes without metal (snaps, zippers). A sweatsuit works well.            Feb 12, 2018 12:00 PM CST   (Arrive by 11:45 AM)   MA SCREENING DIGITAL BILATERAL with UCBCMA1   [facility] ([facility])    [address] [place] [address]  2nd [facility] [id number] [phone]           Do not use any powder, lotion or deodorant under your arms or on your breast. If you do, we will ask you to remove it before your exam.  Wear comfortable, two-piece clothing.  If you have any allergies, tell your care team.  Bring any previous mammograms from other facilities or have them mailed to the breast center. Three-dimensional (3D) mammograms are  "available at [facility] locations in [place], [place], [place], [place], [facility], [place], [place], and [place]. -Health locations include [place] and [facility] [place] in [place]. Benefits of 3D mammograms include: - Improved rate of cancer detection - Decreases your chance of having to go back for more tests, which means fewer: - \"False-positive\" results (This means that there is an abnormal area but it isn't cancer.) - Invasive testing procedures, such as a biopsy or surgery - Can provide clearer images of the breast if you have dense breast tissue. 3D mammography is an optional exam that anyone can have with a 2D mammogram. It doesn't replace or take the place of a 2D mammogram. 2D mammograms remain an effective screening test for all women.  Not all insurance companies cover the cost of a 3D mammogram. Check with your insurance.              Future tests that were ordered for you today     Open Future Orders        Priority Expected Expires Ordered    CREATININE (Today) Routine 1/24/2018 1/24/2019 1/24/2018    HEMOGLOBIN A1C -(Today) Routine 1/24/2018 1/24/2019 1/24/2018    Lipid panel reflex to direct LDL Fasting Routine 1/24/2018 1/24/2019 1/24/2018    UA with Micro reflex to Culture Routine 1/24/2018 1/24/2019 1/24/2018            Who to contact     Please call your clinic at [phone] to:    Ask questions about your health    Make or cancel appointments    Discuss your medicines    Learn about your test results    Speak to your doctor   If you have compliments or concerns about an experience at your clinic, or if you wish to file a complaint, please contact [facility] Patient Relations at [phone] or email us at [email or web address]         Additional Information About Your Visit        Ticies Information     Ticies is an electronic gateway that provides easy, online access to your medical records. With Ticies, you can request a clinic " appointment, read your test results, renew a prescription or communicate with your care team.     To sign up for SocialF5hart visit the website at [email or web address]   You will be asked to enter the access code listed below, as well as some personal information. Please follow the directions to create your username and password.     Your access code is: [email or web address]  Expires: 2018  6:30 AM     Your access code will  in 90 days. If you need help or a new code, please contact your [facility] or call [phone] for assistance.        Care EveryWhere ID     This is your Care EveryWhere ID. This could be used by other organizations to access your [facility] medical records  [id number]        Your Vitals Were     Pulse Breastfeeding?                87 No           Blood Pressure from Last 3 Encounters:   18 130/80   17 123/67   17 120/75    Weight from Last 3 Encounters:   17 88.5 kg (195 lb)   16 97.5 kg (215 lb)   16 97.5 kg (215 lb)                 Today's Medication Changes          These changes are accurate as of 18 12:53 PM.  If you have any questions, ask your nurse or doctor.               Start taking these medicines.        Dose/Directions    ciprofloxacin 250 MG tablet   Commonly known as:  CIPRO   Used for:  Recurrent UTI   Started by:  Maryam Clayton MD PhD        Dose:  250 mg   Take 1 tablet (250 mg) by mouth 2 times daily   Quantity:  20 tablet   Refills:  1            Where to get your medicines      These medications were sent to [facility] [address] [facility] [address]  [address] [place] [address] [address]    Hours:  TRANSPLANT PHONE NUMBER [phone] Phone:  [phone]     ciprofloxacin 250 MG tablet                Primary Care Provider Office Phone # Fax #    Maryam Clayton MD PhD [phone]       [address] [place] [address]  381  [facility] [address]        Equal Access to Services     [facility] DAIANA : Hadii adrián kendrick shielamelissa Brittneyali, wamichaelda luqteresaha, qaviktorta marandathiencullen loco. So [facility] [phone].    ATENCIÓN: Si habla español, tiene a resendiz disposición servicios gratuitos de asistencia lingüística. Nickame al [phone].    We comply with applicable federal civil rights laws and [place] laws. We do not discriminate on the basis of race, color, national origin, age, disability, sex, sexual orientation, or gender identity.            Thank you!     Thank you for choosing [facility]  for your care. Our goal is always to provide you with excellent care. Hearing back from our patients is one way we can continue to improve our services. Please take a few minutes to complete the written survey that you may receive in the mail after your visit with us. Thank you!             Your Updated Medication List - Protect others around you: Learn how to safely use, store and throw away your medicines at www.disposemymeds.org.          This list is accurate as of 1/24/18 12:53 PM.  Always use your most recent med list.                   Brand Name Dispense Instructions for use Diagnosis    acetaminophen 500 MG tablet    TYLENOL     Take 500-1,000 mg by mouth every 6 hours as needed for mild pain        ADVIL 200 MG tablet   Generic drug:  ibuprofen      Take 200 mg by mouth every 4 hours as needed.        aspirin 81 MG tablet      Take 81 mg by mouth daily        blood glucose monitoring lancets     1 Box    Use to test blood sugar 2-3 times daily or as directed.  Ok to substitute alternative if insurance prefers.    Type 2 diabetes mellitus with diabetic neuropathy, without long-term current use of insulin (H)       blood glucose monitoring meter device kit     1 kit    Use to test blood sugar 2-3 times daily or as directed.  Ok to substitute alternative if insurance prefers.        blood  glucose monitoring test strip    ACCU-CHEK CORTEZ PLUS    1 Box    Use to test blood sugar 2-3 times daily or as directed.  Ok to substitute alternative if insurance prefers.    Type 2 diabetes mellitus with diabetic neuropathy, without long-term current use of insulin (H)       calcium-vitamin D-vitamin K 500-500-40 MG-UNT-MCG Chew    VIACTIV     Take 1 tablet by mouth daily        ciprofloxacin 250 MG tablet    CIPRO    20 tablet    Take 1 tablet (250 mg) by mouth 2 times daily    Recurrent UTI       FLUoxetine 10 MG capsule    PROzac    180 capsule    Take 2 capsules (20 mg) by mouth daily    Major depressive disorder, recurrent episode, mild (H)       glipiZIDE 10 MG 24 hr tablet    glipiZIDE XL    180 tablet    Take 2 tablets (20 mg) by mouth daily        insulin degludec-liraglutide pen    XULTOPHY 100/3.6    15 mL    Inject 16 Units Subcutaneous daily titrating as directed    Type 2 diabetes mellitus with diabetic neuropathy, unspecified long term insulin use status (H)       insulin pen needle 31G X 8 MM     90 each    Use with Victoza injections once daily.    Type 2 diabetes mellitus with other specified complication, with long-term current use of insulin (H)       metFORMIN 1000 MG tablet    GLUCOPHAGE    180 tablet    Take 1 tablet (1,000 mg) by mouth 2 times daily (with meals)    Type 2 diabetes mellitus with complication, without long-term current use of insulin (H)       MULTIVITAMIN WOMEN 50+ PO      Take 1 tablet by mouth daily        nitroGLYcerin 0.4 MG sublingual tablet    NITROSTAT    25 tablet    For chest pain place 1 tablet under the tongue every 5 minutes for 3 doses. If symptoms persist 5 minutes after 1st dose call 911.    Chest tightness or pressure       pravastatin 40 MG tablet    PRAVACHOL    180 tablet    Take 2 tablets (80 mg) by mouth daily (please dispense as 40mg tabs)    Hyperlipidemia LDL goal <100       vitamin D 2000 UNITS tablet      Take 1 tablet by mouth daily.

## 2018-01-27 LAB
BACTERIA SPEC CULT: ABNORMAL
BACTERIA SPEC CULT: ABNORMAL
Lab: ABNORMAL
SPECIMEN SOURCE: ABNORMAL

## 2018-02-02 ENCOUNTER — ALLIED HEALTH/NURSE VISIT (OUTPATIENT)
Dept: PHARMACY | Facility: CLINIC | Age: 73
End: 2018-02-02
Payer: COMMERCIAL

## 2018-02-02 DIAGNOSIS — R30.0 DYSURIA: Primary | ICD-10-CM

## 2018-02-02 DIAGNOSIS — E11.40 TYPE 2 DIABETES MELLITUS WITH DIABETIC NEUROPATHY, WITHOUT LONG-TERM CURRENT USE OF INSULIN (H): ICD-10-CM

## 2018-02-02 PROCEDURE — 99607 MTMS BY PHARM ADDL 15 MIN: CPT | Mod: U4 | Performed by: PHARMACIST

## 2018-02-02 PROCEDURE — 99606 MTMS BY PHARM EST 15 MIN: CPT | Mod: U4 | Performed by: PHARMACIST

## 2018-02-02 NOTE — MR AVS SNAPSHOT
"              After Visit Summary   2/2/2018    Lindsay Nieves    MRN: [id number]           Patient Information     Date Of Birth          1945        Visit Information        Provider Department      2/2/2018 9:30 AM [facility]        Today's Diagnoses     Dysuria    -  1    Type 2 diabetes mellitus with diabetic neuropathy, without long-term current use of insulin (H)           Follow-ups after your visit        Your next 10 appointments already scheduled     Feb 12, 2018 11:30 AM CST   DX HIP/PELVIS/SPINE with UCDX1   [facility] ([facility])    [address] [place] [id number] [phone]           Please do not take any of the following 24 hours prior to the day of your exam: vitamins, calcium tablets, antacids.  If possible, please wear clothes without metal (snaps, zippers). A sweatsuit works well.            Feb 12, 2018 12:00 PM CST   (Arrive by 11:45 AM)   MA SCREENING DIGITAL BILATERAL with UCBCMA1   [facility] ([facility])    [address] [place] [id number] [phone]           Do not use any powder, lotion or deodorant under your arms or on your breast. If you do, we will ask you to remove it before your exam.  Wear comfortable, two-piece clothing.  If you have any allergies, tell your care team.  Bring any previous mammograms from other facilities or have them mailed to the breast center. Three-dimensional (3D) mammograms are available at [facility] locations in [facility], [facility], [facility], and [place]. [facility] locations include [place] and [facility] Center in [place]. Benefits of 3D mammograms include: - Improved rate of cancer detection - Decreases your chance of having to go back for more tests, which means fewer: - \"False-positive\" " "results (This means that there is an abnormal area but it isn't cancer.) - Invasive testing procedures, such as a biopsy or surgery - Can provide clearer images of the breast if you have dense breast tissue. 3D mammography is an optional exam that anyone can have with a 2D mammogram. It doesn't replace or take the place of a 2D mammogram. 2D mammograms remain an effective screening test for all women.  Not all insurance companies cover the cost of a 3D mammogram. Check with your insurance.            Feb 16, 2018  9:00 AM CST   TELEMEDICINE with Cassandra Murdock RPH   [facility] Medication Therapy Management ([facility])    [address] [facility]  4th Floor  [facility] [address] [phone]           Note: this is not an onsite visit; there is no need to come to the facility.              Who to contact     If you have questions or need follow up information about today's clinic visit or your schedule please contact [facility] MEDICATION THERAPY MANAGEMENT directly at [phone].  Normal or non-critical lab and imaging results will be communicated to you by Babytreehart, letter or phone within 4 business days after the clinic has received the results. If you do not hear from us within 7 days, please contact the clinic through [employer] or phone. If you have a critical or abnormal lab result, we will notify you by phone as soon as possible.  Submit refill requests through [employer] or call your pharmacy and they will forward the refill request to us. Please allow 3 business days for your refill to be completed.          Additional Information About Your Visit        [employer] Information     [employer] lets you send messages to your doctor, view your test results, renew your prescriptions, schedule appointments and more. To sign up, go to [email or web address] . Click on \"Log in\" on the left side of the screen, which will take you to the Welcome page. Then click on \"Sign up Now\" on the right side " of the page.     You will be asked to enter the access code listed below, as well as some personal information. Please follow the directions to create your username and password.     Your access code is: [email or web address]  Expires: 2018  6:30 AM     Your access code will  in 90 days. If you need help or a new code, please call your [facility] clinic or [phone].        Care EveryWhere ID     This is your Care EveryWhere ID. This could be used by other organizations to access your [facility] medical records  [id number]         Blood Pressure from Last 3 Encounters:   18 130/80   17 123/67   17 120/75    Weight from Last 3 Encounters:   17 195 lb (88.5 kg)   16 215 lb (97.5 kg)   16 215 lb (97.5 kg)              Today, you had the following     No orders found for display       Primary Care Provider Office Phone # Fax #    Maryam Clayton MD PhD [phone]       [address] [place] [address]        Equal Access to Services     [facility]: Hadii aad ku hadarleneo Solele, waaxda luqadaha, qaybta kaalmada aderosmeryyada, cullen trujillo . So [facility] [phone].    ATENCIÓN: Si habla español, tiene a resendiz disposición servicios gratuitos de asistencia lingüística. Llame al [phone].    We comply with applicable federal civil rights laws and [place] laws. We do not discriminate on the basis of race, color, national origin, age, disability, sex, sexual orientation, or gender identity.            Thank you!     Thank you for choosing [facility] MEDICATION THERAPY MANAGEMENT  for your care. Our goal is always to provide you with excellent care. Hearing back from our patients is one way we can continue to improve our services. Please take a few minutes to complete the written survey that you may receive in the mail after your visit with us. Thank you!             Your Updated Medication List - Protect others around you: Learn  how to safely use, store and throw away your medicines at www.disposemymeds.org.          This list is accurate as of 2/2/18 11:37 AM.  Always use your most recent med list.                   Brand Name Dispense Instructions for use Diagnosis    acetaminophen 500 MG tablet    TYLENOL     Take 500-1,000 mg by mouth every 6 hours as needed for mild pain        ADVIL 200 MG tablet   Generic drug:  ibuprofen      Take 200 mg by mouth every 4 hours as needed.        aspirin 81 MG tablet      Take 81 mg by mouth daily        blood glucose monitoring lancets     1 Box    Use to test blood sugar 2-3 times daily or as directed.  Ok to substitute alternative if insurance prefers.    Type 2 diabetes mellitus with diabetic neuropathy, without long-term current use of insulin (H)       blood glucose monitoring meter device kit     1 kit    Use to test blood sugar 2-3 times daily or as directed.  Ok to substitute alternative if insurance prefers.        blood glucose monitoring test strip    ACCU-CHEK CORTEZ PLUS    1 Box    Use to test blood sugar 2-3 times daily or as directed.  Ok to substitute alternative if insurance prefers.    Type 2 diabetes mellitus with diabetic neuropathy, without long-term current use of insulin (H)       calcium-vitamin D-vitamin K 500-500-40 MG-UNT-MCG Chew    VIACTIV     Take 1 tablet by mouth daily        ciprofloxacin 250 MG tablet    CIPRO    20 tablet    Take 1 tablet (250 mg) by mouth 2 times daily    Recurrent UTI       FLUoxetine 10 MG capsule    PROzac    180 capsule    Take 2 capsules (20 mg) by mouth daily    Major depressive disorder, recurrent episode, mild (H)       glipiZIDE 10 MG 24 hr tablet    glipiZIDE XL    180 tablet    Take 2 tablets (20 mg) by mouth daily        insulin degludec-liraglutide pen    XULTOPHY 100/3.6    15 mL    Inject 16 Units Subcutaneous daily titrating as directed    Type 2 diabetes mellitus with diabetic neuropathy, unspecified long term insulin use status  (H)       insulin pen needle 31G X 8 MM     90 each    Use with Victoza injections once daily.    Type 2 diabetes mellitus with other specified complication, with long-term current use of insulin (H)       metFORMIN 1000 MG tablet    GLUCOPHAGE    180 tablet    Take 1 tablet (1,000 mg) by mouth 2 times daily (with meals)    Type 2 diabetes mellitus with complication, without long-term current use of insulin (H)       MULTIVITAMIN WOMEN 50+ PO      Take 1 tablet by mouth daily        nitroGLYcerin 0.4 MG sublingual tablet    NITROSTAT    25 tablet    For chest pain place 1 tablet under the tongue every 5 minutes for 3 doses. If symptoms persist 5 minutes after 1st dose call 911.    Chest tightness or pressure       pravastatin 40 MG tablet    PRAVACHOL    180 tablet    Take 2 tablets (80 mg) by mouth daily (please dispense as 40mg tabs)    Hyperlipidemia LDL goal <100       vitamin D 2000 UNITS tablet      Take 1 tablet by mouth daily.

## 2018-02-02 NOTE — PROGRESS NOTES
"SUBJECTIVE/OBJECTIVE:                Lindsay Nieves is a 72 year old female called for a follow-up visit for Medication Therapy Management.  She was referred to me from Dr. Clayton.     Chief Complaint: Follow up from our visit on 18.  We are following up on diabetes today.    Tobacco: No tobacco use  Alcohol: not currently using    Medication Adherence: no issues reported    UTI: She saw Dr. Clayton last Wednesday to help with the residual symptoms of a UTI.  She has a few days of ciprofloxacin remaining and reports that she feels much better today. The burning, discomfort and bladder fullness has pretty much resolved. She used the AZO as directed until she saw Dr. Clayton which was also helpful for her.  Dr. Clayton has recommended that she come to see her directly for future infection concerns given history of Klebsiella and infectious diarrhea. She is very pleased with her progress.     Diabetes:  Pt currently taking Soliqua 16 units daily, metformin 1000 mg twice daily, glipizide XL 20 mg daily. Pt is not experiencing side effects.  She feels like her blood sugars are starting to look a lot better now that her infection is mostly resolved.  SMBG: two times daily.   Ranges (patient reported):     FP-150  HS: 270, 170-180's mostly    Patient is not experiencing hypoglycemia  Recent symptoms of high blood sugar? none  Eye exam: up to date  Foot exam: up to date  Microalbumin is < 30 mg/g. Pt is not taking an ACEi/ARB d/t lack of HTN and proteinuria.   Aspirin: Taking 81mg daily and denies side effects  Diet/Exercise: She would like to start keeping a food diary to check herself on \"quantity\" and \"quality\" of her eating.  She would also like to start exercising more but is concerned about the side effects related to her ciprofloxacin.      Current labs include:  BP Readings from Last 3 Encounters:   18 130/80   17 123/67   17 120/75     Today's Vitals: There were no vitals taken for this " visit. - telemed    Lab Results   Component Value Date    A1C 8.4 05/12/2017    A1C 9.5 12/29/2016    A1C 7.5 04/27/2016    A1C 8.7 01/25/2016    A1C 7.8 09/02/2015     Recent Labs   Lab Test  12/29/16   1027  09/02/15   0909  07/17/14   0900   CHOL  153  168  189   HDL  63  60  49*   LDL  57  72  93   TRIG  164*  177*  232*   CHOLHDLRATIO   --   2.8  3.8     Liver Function Studies -   Recent Labs   Lab Test  02/25/13   0826   PROTTOTAL  7.2   ALBUMIN  4.0   BILITOTAL  0.5   ALKPHOS  74   AST  20   ALT  30     Lab Results   Component Value Date    UCRR 25 05/12/2017    MICROL <5 05/12/2017    UMALCR Unable to calculate due to low value 05/12/2017     Last Basic Metabolic Panel:  Lab Results   Component Value Date     02/08/2017      Lab Results   Component Value Date    POTASSIUM 4.1 02/08/2017     Lab Results   Component Value Date    CHLORIDE 105 02/08/2017     Lab Results   Component Value Date    BUN 11 02/08/2017     Lab Results   Component Value Date    CR 0.64 02/08/2017     GFR Estimate   Date Value Ref Range Status   02/08/2017 >90  Non  GFR Calc   >60 mL/min/1.7m2 Final   04/27/2016 >90  Non  GFR Calc   >60 mL/min/1.7m2 Final   09/02/2015 >90  Non  GFR Calc   >60 mL/min/1.7m2 Final     TSH   Date Value Ref Range Status   05/12/2017 2.38 0.40 - 4.00 mU/L Final     Most Recent Immunizations   Administered Date(s) Administered     HepB 09/14/2016     Influenza (High Dose) 3 valent vaccine 10/04/2017     Influenza (IIV3) PF 10/03/2012     Pneumo Conj 13-V (2010&after) 11/03/2014     Pneumococcal (PCV 7) 04/12/2006     Pneumococcal 23 valent 02/25/2013     TDAP Vaccine (Boostrix) 02/25/2013     Tetanus 09/19/2005     Zoster vaccine, live 05/18/2009       ASSESSMENT:              Current medications were reviewed today as discussed above.      Medication Adherence: no issues identified    UTI: Improved.  Patient would benefit from finishing ciprofloxacin as  directed. Ciprofloxacin does increase risk for tendon rupture in patients older than 65.  Light exercise is safe at this time.    Diabetes: Improved. Patient is not meeting A1c goal of < 8%. Self monitoring of blood glucose is at goal of fasting  mg/dL per patient report now that her infection is resolving. Pt would benefit from continued SMBG and close follow-up. No dose changes should be made until patient recovers from her infection.  Agree with lifestyle monitoring at home.      PLAN:                  1) Continue Soliqua 16 units daily and SMBG twice daily.   2) Write down your eating in a food journal for the next 2 weeks.  Feel free to engage in light exercise while you finish taking ciprofloxacin.     I spent 20 minutes with this patient today. All changes were made via collaborative practice agreement with Maryam Clayton. A copy of the visit note was provided to the patient's primary care provider.     Will follow up in 2 weeks.    The patient declined a summary of these recommendations as an after visit summary.    Cassandra Murdock, Pharm.D., BCACP  Medication Therapy Management Pharmacist  Page/VM:  [phone]

## 2018-02-16 ENCOUNTER — ALLIED HEALTH/NURSE VISIT (OUTPATIENT)
Dept: PHARMACY | Facility: CLINIC | Age: 73
End: 2018-02-16
Payer: COMMERCIAL

## 2018-02-16 DIAGNOSIS — E11.40 TYPE 2 DIABETES MELLITUS WITH DIABETIC NEUROPATHY, WITHOUT LONG-TERM CURRENT USE OF INSULIN (H): Primary | ICD-10-CM

## 2018-02-16 PROCEDURE — 99607 MTMS BY PHARM ADDL 15 MIN: CPT | Mod: U4 | Performed by: PHARMACIST

## 2018-02-16 PROCEDURE — 99606 MTMS BY PHARM EST 15 MIN: CPT | Mod: U4 | Performed by: PHARMACIST

## 2018-02-16 NOTE — PROGRESS NOTES
SUBJECTIVE/OBJECTIVE:                Lindsay Nieves is a 72 year old female called for a follow-up visit for Medication Therapy Management.  She was referred to me from Dr. Clatyon. She reports that her  had a fall 2 weeks ago that resulted in an ED visit.  He is recovering at home.     Chief Complaint: Follow up from our visit on 2/2/18.  We are following up on diabetes and recent illnesses today.     Tobacco: No tobacco use  Alcohol: not currently using    Medication Adherence: no issues reported    Diabetes:  Pt currently taking glipizide XL 10 mg 2 tablets daily, metformin 1000 mg twice daily and Soliqua 16 units daily. Pt is not experiencing side effects.  SMBG: two times daily.   Ranges (from patient's glucose log):     FBG/ 2hours post HS   122 180   150 194   172 142   142    135      No numbers greater than 200 mg/dL in the last 2 weeks.     Patient is not experiencing hypoglycemia  Recent symptoms of high blood sugar? none  Eye exam: up to date  Foot exam: up to date  Microalbumin is not < 30 mg/g. Pt is not taking an ACEi/ARB.  Aspirin: Taking 81mg daily and denies side effects  Diet/Exercise: She has not been as diligent with her diet recently d/t Vargas's fall and subsequent concussion.     Current labs include:  BP Readings from Last 3 Encounters:   01/24/18 130/80   11/29/17 123/67   05/17/17 120/75     Today's Vitals: There were no vitals taken for this visit. - telemed     Lab Results   Component Value Date    A1C 8.4 05/12/2017    A1C 9.5 12/29/2016    A1C 7.5 04/27/2016    A1C 8.7 01/25/2016    A1C 7.8 09/02/2015     Recent Labs   Lab Test  12/29/16   1027  09/02/15   0909  07/17/14   0900   CHOL  153  168  189   HDL  63  60  49*   LDL  57  72  93   TRIG  164*  177*  232*   CHOLHDLRATIO   --   2.8  3.8     Liver Function Studies -   Recent Labs   Lab Test  02/25/13   0826   PROTTOTAL  7.2   ALBUMIN  4.0   BILITOTAL  0.5   ALKPHOS  74   AST  20   ALT  30     Lab Results   Component Value Date     UCRR 25 05/12/2017    MICROL <5 05/12/2017    UMALCR Unable to calculate due to low value 05/12/2017     Last Basic Metabolic Panel:  Lab Results   Component Value Date     02/08/2017      Lab Results   Component Value Date    POTASSIUM 4.1 02/08/2017     Lab Results   Component Value Date    CHLORIDE 105 02/08/2017     Lab Results   Component Value Date    BUN 11 02/08/2017     Lab Results   Component Value Date    CR 0.64 02/08/2017     GFR Estimate   Date Value Ref Range Status   02/08/2017 >90  Non  GFR Calc   >60 mL/min/1.7m2 Final   04/27/2016 >90  Non  GFR Calc   >60 mL/min/1.7m2 Final   09/02/2015 >90  Non  GFR Calc   >60 mL/min/1.7m2 Final     TSH   Date Value Ref Range Status   05/12/2017 2.38 0.40 - 4.00 mU/L Final     Most Recent Immunizations   Administered Date(s) Administered     HepB 09/14/2016     Influenza (High Dose) 3 valent vaccine 10/04/2017     Influenza (IIV3) PF 10/03/2012     Pneumo Conj 13-V (2010&after) 11/03/2014     Pneumococcal (PCV 7) 04/12/2006     Pneumococcal 23 valent 02/25/2013     TDAP Vaccine (Boostrix) 02/25/2013     Tetanus 09/19/2005     Zoster vaccine, live 05/18/2009     ASSESSMENT:              Current medications were reviewed today as discussed above.      Medication Adherence: no issues identified    Diabetes: Stable. Patient is not meeting A1c goal of < 8%. Self monitoring of blood glucose is not at goal of fasting  mg/dL and post prandial < 180 mg/dL. However, all of her blood sugars are less than 200 mg/dL in this visit, indicating improvement since our last discussion. Pt would benefit from a dose increase in Soliqua.     PLAN:                  1) Increase Soliqua dose - patient declines today.     I spent 20 minutes with this patient today. All changes were made via collaborative practice agreement with Maryam Clayton. A copy of the visit note was provided to the patient's primary care provider.      Will follow up in 2 weeks to review blood sugars.    The patient declined a summary of these recommendations as an after visit summary.    Michael FreitasD., [facility]  Medication Therapy Management Pharmacist  Page/VM:  [phone]

## 2018-02-16 NOTE — MR AVS SNAPSHOT
"              After Visit Summary   2/16/2018    Lindsay Nieves    MRN: [id number]           Patient Information     Date Of Birth          1945        Visit Information        Provider Department      2/16/2018 9:00 AM Cassandra Murdock RPH [facility] Medication Therapy Management        Today's Diagnoses     Type 2 diabetes mellitus with diabetic neuropathy, without long-term current use of insulin (H)    -  1       Follow-ups after your visit        Your next 10 appointments already scheduled     Mar 02, 2018  8:30 AM CST   TELEMEDICINE with Cassandra Murdock RPH   [facility] Medication Therapy Management ([facility])    [address]  4th [address]  [facility] [address] [phone]           Note: this is not an onsite visit; there is no need to come to the facility.              Who to contact     If you have questions or need follow up information about today's clinic visit or your schedule please contact [facility] MEDICATION THERAPY MANAGEMENT directly at [phone].  Normal or non-critical lab and imaging results will be communicated to you by CloudLockhart, letter or phone within 4 business days after the clinic has received the results. If you do not hear from us within 7 days, please contact the clinic through [employer] or phone. If you have a critical or abnormal lab result, we will notify you by phone as soon as possible.  Submit refill requests through RocketPlay or call your pharmacy and they will forward the refill request to us. Please allow 3 business days for your refill to be completed.          Additional Information About Your Visit        CloudLockharKaymu Information     RocketPlay lets you send messages to your doctor, view your test results, renew your prescriptions, schedule appointments and more. To sign up, go to [email or web address] . Click on \"Log in\" on the left side of the screen, which will take you to the Welcome page. Then click on \"Sign up Now\" on the " right side of the page.     You will be asked to enter the access code listed below, as well as some personal information. Please follow the directions to create your username and password.     Your access code is: [email or web address]  Expires: 2018  9:10 AM     Your access code will  in 90 days. If you need help or a new code, please call your [place] clinic or [phone].        Care EveryWhere ID     This is your Care EveryWhere ID. This could be used by other organizations to access your [place] medical records  [id number]         Blood Pressure from Last 3 Encounters:   18 130/80   17 123/67   17 120/75    Weight from Last 3 Encounters:   17 195 lb (88.5 kg)   16 215 lb (97.5 kg)   16 215 lb (97.5 kg)              Today, you had the following     No orders found for display       Primary Care Provider Office Phone # Fax #    Maryam Clayton MD PhD [phone]       [address] [place] [address]        Equal Access to Services     [facility]: Hadii aad ku hadasho Solele, waaxda luqadaha, qaybta kaalmada adeegyada, cullen trujillo . So [facility] [phone].    ATENCIÓN: Si habla español, tiene a resendiz disposición servicios gratuitos de asistencia lingüística. Llame al [phone].    We comply with applicable federal civil rights laws and [place] laws. We do not discriminate on the basis of race, color, national origin, age, disability, sex, sexual orientation, or gender identity.            Thank you!     Thank you for choosing [facility] MEDICATION THERAPY MANAGEMENT  for your care. Our goal is always to provide you with excellent care. Hearing back from our patients is one way we can continue to improve our services. Please take a few minutes to complete the written survey that you may receive in the mail after your visit with us. Thank you!             Your Updated Medication List - Protect others around  you: Learn how to safely use, store and throw away your medicines at www.disposemymeds.org.          This list is accurate as of 2/16/18  9:10 AM.  Always use your most recent med list.                   Brand Name Dispense Instructions for use Diagnosis    acetaminophen 500 MG tablet    TYLENOL     Take 500-1,000 mg by mouth every 6 hours as needed for mild pain        ADVIL 200 MG tablet   Generic drug:  ibuprofen      Take 200 mg by mouth every 4 hours as needed.        aspirin 81 MG tablet      Take 81 mg by mouth daily        blood glucose monitoring lancets     1 Box    Use to test blood sugar 2-3 times daily or as directed.  Ok to substitute alternative if insurance prefers.    Type 2 diabetes mellitus with diabetic neuropathy, without long-term current use of insulin (H)       blood glucose monitoring meter device kit     1 kit    Use to test blood sugar 2-3 times daily or as directed.  Ok to substitute alternative if insurance prefers.        blood glucose monitoring test strip    ACCU-CHEK CORTEZ PLUS    1 Box    Use to test blood sugar 2-3 times daily or as directed.  Ok to substitute alternative if insurance prefers.    Type 2 diabetes mellitus with diabetic neuropathy, without long-term current use of insulin (H)       calcium-vitamin D-vitamin K 500-500-40 MG-UNT-MCG Chew    VIACTIV     Take 1 tablet by mouth daily        ciprofloxacin 250 MG tablet    CIPRO    20 tablet    Take 1 tablet (250 mg) by mouth 2 times daily    Recurrent UTI       FLUoxetine 10 MG capsule    PROzac    180 capsule    Take 2 capsules (20 mg) by mouth daily    Major depressive disorder, recurrent episode, mild (H)       glipiZIDE 10 MG 24 hr tablet    glipiZIDE XL    180 tablet    Take 2 tablets (20 mg) by mouth daily        insulin degludec-liraglutide pen    XULTOPHY 100/3.6    15 mL    Inject 16 Units Subcutaneous daily titrating as directed    Type 2 diabetes mellitus with diabetic neuropathy, unspecified long term insulin  use status (H)       insulin pen needle 31G X 8 MM     90 each    Use with Victoza injections once daily.    Type 2 diabetes mellitus with other specified complication, with long-term current use of insulin (H)       metFORMIN 1000 MG tablet    GLUCOPHAGE    180 tablet    Take 1 tablet (1,000 mg) by mouth 2 times daily (with meals)    Type 2 diabetes mellitus with complication, without long-term current use of insulin (H)       MULTIVITAMIN WOMEN 50+ PO      Take 1 tablet by mouth daily        nitroGLYcerin 0.4 MG sublingual tablet    NITROSTAT    25 tablet    For chest pain place 1 tablet under the tongue every 5 minutes for 3 doses. If symptoms persist 5 minutes after 1st dose call 911.    Chest tightness or pressure       pravastatin 40 MG tablet    PRAVACHOL    180 tablet    Take 2 tablets (80 mg) by mouth daily (please dispense as 40mg tabs)    Hyperlipidemia LDL goal <100       vitamin D 2000 UNITS tablet      Take 1 tablet by mouth daily.

## 2018-03-02 ENCOUNTER — ALLIED HEALTH/NURSE VISIT (OUTPATIENT)
Dept: PHARMACY | Facility: CLINIC | Age: 73
End: 2018-03-02
Payer: COMMERCIAL

## 2018-03-02 DIAGNOSIS — E11.40 TYPE 2 DIABETES MELLITUS WITH DIABETIC NEUROPATHY, UNSPECIFIED LONG TERM INSULIN USE STATUS: ICD-10-CM

## 2018-03-02 DIAGNOSIS — E11.40 TYPE 2 DIABETES MELLITUS WITH DIABETIC NEUROPATHY, WITHOUT LONG-TERM CURRENT USE OF INSULIN (H): Primary | ICD-10-CM

## 2018-03-02 PROCEDURE — 99606 MTMS BY PHARM EST 15 MIN: CPT | Mod: U4 | Performed by: PHARMACIST

## 2018-03-02 NOTE — MR AVS SNAPSHOT
"              After Visit Summary   3/2/2018    Lindsay Nieves    MRN: [id number]           Patient Information     Date Of Birth          1945        Visit Information        Provider Department      3/2/2018 8:30 AM Cassandra Murdock RPH [facility] Medication Therapy Management        Today's Diagnoses     Type 2 diabetes mellitus with diabetic neuropathy, without long-term current use of insulin (H)    -  1    Type 2 diabetes mellitus with diabetic neuropathy, unspecified long term insulin use status (H)           Follow-ups after your visit        Your next 10 appointments already scheduled     Mar 16, 2018  9:00 AM CDT   TELEMEDICINE with Cassandra Murdock RPH   [facility] Medication Therapy Management ([facility])    [address] [facility]  4th Floor  [facility] [address] [phone]           Note: this is not an onsite visit; there is no need to come to the facility.              Who to contact     If you have questions or need follow up information about today's clinic visit or your schedule please contact [facility] MEDICATION THERAPY MANAGEMENT directly at [phone].  Normal or non-critical lab and imaging results will be communicated to you by [address] + Companyhart, letter or phone within 4 business days after the clinic has received the results. If you do not hear from us within 7 days, please contact the clinic through [employer] Softwaret or phone. If you have a critical or abnormal lab result, we will notify you by phone as soon as possible.  Submit refill requests through ReaMetrix or call your pharmacy and they will forward the refill request to us. Please allow 3 business days for your refill to be completed.          Additional Information About Your Visit        [address] [employer] Information     ReaMetrix lets you send messages to your doctor, view your test results, renew your prescriptions, schedule appointments and more. To sign up, go to [email or web address] . Click on \"Log in\" on the " "left side of the screen, which will take you to the Welcome page. Then click on \"Sign up Now\" on the right side of the page.     You will be asked to enter the access code listed below, as well as some personal information. Please follow the directions to create your username and password.     Your access code is: [email or web address]  Expires: 2018  9:10 AM     Your access code will  in 90 days. If you need help or a new code, please call your [place] clinic or [phone].        Care EveryWhere ID     This is your Care EveryWhere ID. This could be used by other organizations to access your [place] medical records  [id number]         Blood Pressure from Last 3 Encounters:   18 130/80   17 123/67   17 120/75    Weight from Last 3 Encounters:   17 195 lb (88.5 kg)   16 215 lb (97.5 kg)   16 215 lb (97.5 kg)              Today, you had the following     No orders found for display         Today's Medication Changes          These changes are accurate as of 3/2/18  8:50 AM.  If you have any questions, ask your nurse or doctor.               These medicines have changed or have updated prescriptions.        Dose/Directions    insulin degludec-liraglutide pen   Commonly known as:  XULTOPHY 100/3.6   This may have changed:  how much to take   Used for:  Type 2 diabetes mellitus with diabetic neuropathy, unspecified long term insulin use status (H)        Dose:  18 Units   Inject 18 Units Subcutaneous daily titrating as directed   Quantity:  15 mL   Refills:  1            Where to get your medicines      These medications were sent to [facility] [id number] [place] [address] [address] AT [facility] [address]  [address] [facility] [address]     Phone:  [phone]     insulin degludec-liraglutide pen                Primary Care Provider Office Phone # Fax #    Maryam Clayton MD PhD [phone]       [address] [place] [address]      "   Equal Access to Services     [facility] : Hadii adrián kendrick shielamelissa Michael, wamichaelda luqadaha, qaybta kathiencullen loco. So [facility] [phone].    ATENCIÓN: Si habla español, tiene a resendiz disposición servicios gratuitos de asistencia lingüística. Llame al [phone].    We comply with applicable federal civil rights laws and [place] laws. We do not discriminate on the basis of race, color, national origin, age, disability, sex, sexual orientation, or gender identity.            Thank you!     Thank you for choosing [facility] MEDICATION THERAPY MANAGEMENT  for your care. Our goal is always to provide you with excellent care. Hearing back from our patients is one way we can continue to improve our services. Please take a few minutes to complete the written survey that you may receive in the mail after your visit with us. Thank you!             Your Updated Medication List - Protect others around you: Learn how to safely use, store and throw away your medicines at www.disposemymeds.org.          This list is accurate as of 3/2/18  8:50 AM.  Always use your most recent med list.                   Brand Name Dispense Instructions for use Diagnosis    acetaminophen 500 MG tablet    TYLENOL     Take 500-1,000 mg by mouth every 6 hours as needed for mild pain        ADVIL 200 MG tablet   Generic drug:  ibuprofen      Take 200 mg by mouth every 4 hours as needed.        aspirin 81 MG tablet      Take 81 mg by mouth daily        blood glucose monitoring lancets     1 Box    Use to test blood sugar 2-3 times daily or as directed.  Ok to substitute alternative if insurance prefers.    Type 2 diabetes mellitus with diabetic neuropathy, without long-term current use of insulin (H)       blood glucose monitoring meter device kit     1 kit    Use to test blood sugar 2-3 times daily or as directed.  Ok to substitute alternative if insurance prefers.        blood glucose monitoring test  strip    ACCU-CHEK CORTEZ PLUS    1 Box    Use to test blood sugar 2-3 times daily or as directed.  Ok to substitute alternative if insurance prefers.    Type 2 diabetes mellitus with diabetic neuropathy, without long-term current use of insulin (H)       calcium-vitamin D-vitamin K 500-500-40 MG-UNT-MCG Chew    VIACTIV     Take 1 tablet by mouth daily        FLUoxetine 10 MG capsule    PROzac    180 capsule    Take 2 capsules (20 mg) by mouth daily    Major depressive disorder, recurrent episode, mild (H)       glipiZIDE 10 MG 24 hr tablet    glipiZIDE XL    180 tablet    Take 2 tablets (20 mg) by mouth daily        insulin degludec-liraglutide pen    XULTOPHY 100/3.6    15 mL    Inject 18 Units Subcutaneous daily titrating as directed    Type 2 diabetes mellitus with diabetic neuropathy, unspecified long term insulin use status (H)       insulin pen needle 31G X 8 MM     90 each    Use with Victoza injections once daily.    Type 2 diabetes mellitus with other specified complication, with long-term current use of insulin (H)       metFORMIN 1000 MG tablet    GLUCOPHAGE    180 tablet    Take 1 tablet (1,000 mg) by mouth 2 times daily (with meals)    Type 2 diabetes mellitus with complication, without long-term current use of insulin (H)       MULTIVITAMIN WOMEN 50+ PO      Take 1 tablet by mouth daily        nitroGLYcerin 0.4 MG sublingual tablet    NITROSTAT    25 tablet    For chest pain place 1 tablet under the tongue every 5 minutes for 3 doses. If symptoms persist 5 minutes after 1st dose call 911.    Chest tightness or pressure       pravastatin 40 MG tablet    PRAVACHOL    180 tablet    Take 2 tablets (80 mg) by mouth daily (please dispense as 40mg tabs)    Hyperlipidemia LDL goal <100       vitamin D 2000 UNITS tablet      Take 1 tablet by mouth daily.

## 2018-03-02 NOTE — PROGRESS NOTES
SUBJECTIVE/OBJECTIVE:                Lindsay Nieves is a 72 year old female called for a follow-up visit for Medication Therapy Management.  She was referred to me from Dr. Clayton. Lindsay endorses some trouble with the aftermath of her , Vargas's, falls last month.  She is having some frustration with so many new people in her home and where she fits in with all of these new things. She does not have outstanding questions regarding Vargas's most recent appointments.     Chief Complaint: Follow up from our visit on 18.  We are following up on diabetes    Tobacco: No tobacco use  Alcohol: not currently using    Medication Adherence/Access:  no issues reported    Diabetes:  Pt currently taking glipizide XL 10 mg 2 tablets daily, and metformin 1000 mg twice daily, Xultophy 16 units daily. Pt is not experiencing side effects. However, she has had the opportunity to think about a dose change of Xultophy and she is open to it today.   SMBG: two times daily.   Ranges (from patient's glucose log):     FP, 128, 150  HS: 175, 180    *Patient endorses that these are very similar to what they were when we last visited. She has 1 refill remaining after she is out.     Patient is not experiencing hypoglycemia  Recent symptoms of high blood sugar? none  Eye exam: up to date  Foot exam: up to date  Microalbumin is < 30 mg/g. Pt is not taking an ACEi/ARB.  Aspirin: Taking 81mg daily and denies side effects  Diet/Exercise: She notes that she needs to get back to the [facility] but it has been hard to leave her  by himself at home. She is hopeful this will improve in the coming weeks.     Current labs include:  Today's Vitals: There were no vitals taken for this visit. - telemed    BP Readings from Last 3 Encounters:   18 130/80   17 123/67   17 120/75     Lab Results   Component Value Date    A1C 8.4 2017    A1C 9.5 2016    A1C 7.5 2016    A1C 8.7 2016    A1C 7.8 2015      Recent Labs   Lab Test  12/29/16   1027  09/02/15   0909  07/17/14   0900   CHOL  153  168  189   HDL  63  60  49*   LDL  57  72  93   TRIG  164*  177*  232*   CHOLHDLRATIO   --   2.8  3.8     Liver Function Studies -   Recent Labs   Lab Test  02/25/13   0826   PROTTOTAL  7.2   ALBUMIN  4.0   BILITOTAL  0.5   ALKPHOS  74   AST  20   ALT  30     Lab Results   Component Value Date    UCRR 25 05/12/2017    MICROL <5 05/12/2017    UMALCR Unable to calculate due to low value 05/12/2017     Last Basic Metabolic Panel:  Lab Results   Component Value Date     02/08/2017      Lab Results   Component Value Date    POTASSIUM 4.1 02/08/2017     Lab Results   Component Value Date    CHLORIDE 105 02/08/2017     Lab Results   Component Value Date    BUN 11 02/08/2017     Lab Results   Component Value Date    CR 0.64 02/08/2017     GFR Estimate   Date Value Ref Range Status   02/08/2017 >90  Non  GFR Calc   >60 mL/min/1.7m2 Final   04/27/2016 >90  Non  GFR Calc   >60 mL/min/1.7m2 Final   09/02/2015 >90  Non  GFR Calc   >60 mL/min/1.7m2 Final     TSH   Date Value Ref Range Status   05/12/2017 2.38 0.40 - 4.00 mU/L Final     Most Recent Immunizations   Administered Date(s) Administered     HepB 09/14/2016     Influenza (High Dose) 3 valent vaccine 10/04/2017     Influenza (IIV3) PF 10/03/2012     Pneumo Conj 13-V (2010&after) 11/03/2014     Pneumococcal (PCV 7) 04/12/2006     Pneumococcal 23 valent 02/25/2013     TDAP Vaccine (Boostrix) 02/25/2013     Tetanus 09/19/2005     Zoster vaccine, live 05/18/2009     ASSESSMENT:              Current medications were reviewed today as discussed above.      Medication Adherence: excellent, no issues identified    Diabetes: Needs Improvement. Patient is not meeting A1c goal of < 7%. Self monitoring of blood glucose is not at goal of fasting  mg/dL and post prandial < 180 mg/dL. Pt would benefit from a Xultophy dose increase.      PLAN:                  1) Increase Xultophy to 18 units daily.     I spent 15 minutes with this patient today. All changes were made via collaborative practice agreement with Maryam Clayton. A copy of the visit note was provided to the patient's primary care provider.     Will follow up in 2 weeks.    The patient declined a summary of these recommendations as an after visit summary.    Cassandra Murdock, Pharm.D., [facility]  Medication Therapy Management Pharmacist  Page/VM:  [phone]

## 2018-03-03 ENCOUNTER — HEALTH MAINTENANCE LETTER (OUTPATIENT)
Age: 73
End: 2018-03-03

## 2018-03-15 ENCOUNTER — ALLIED HEALTH/NURSE VISIT (OUTPATIENT)
Dept: PHARMACY | Facility: CLINIC | Age: 73
End: 2018-03-15
Payer: COMMERCIAL

## 2018-03-15 DIAGNOSIS — E11.40 TYPE 2 DIABETES MELLITUS WITH DIABETIC NEUROPATHY, WITHOUT LONG-TERM CURRENT USE OF INSULIN (H): Primary | ICD-10-CM

## 2018-03-15 PROCEDURE — 99606 MTMS BY PHARM EST 15 MIN: CPT | Mod: U4 | Performed by: PHARMACIST

## 2018-03-15 NOTE — PROGRESS NOTES
SUBJECTIVE/OBJECTIVE:                Lindsay Nieves is a 72 year old female called for a follow-up visit for Medication Therapy Management.  She was referred to me from Dr. Clayton.     Chief Complaint: Follow up from our visit on 3/2/18.  We are following up on diabetes today    Tobacco: No tobacco use  Alcohol: not currently using    Medication Adherence/Access:  no issues reported    Diabetes:  Pt currently taking Xultophy 18 units daily (recent dose increase), metformin 1000 mg twice daily and glipizide XL 20 mg daily. Pt is not experiencing side effects.  SMBG: two times daily.   Ranges (patient reported):     FP-125  HS: 150    Patient is experiencing hypoglycemia. Frequency of hypoglycemia? rarely. Symptoms of low blood sugar? dizzy, sweaty.   Recent symptoms of high blood sugar? none  Eye exam: up to date  Foot exam: up to date  Microalbumin is < 30 mg/g. Pt is not taking an ACEi/ARB.  Aspirin: Taking 81mg daily and denies side effects    Current labs include:  Today's Vitals: There were no vitals taken for this visit. - telemed    BP Readings from Last 3 Encounters:   18 130/80   17 123/67   17 120/75     Lab Results   Component Value Date    A1C 8.4 2017    A1C 9.5 2016    A1C 7.5 2016    A1C 8.7 2016    A1C 7.8 2015     Recent Labs   Lab Test  16   1027  09/02/15   0909  14   0900   CHOL  153  168  189   HDL  63  60  49*   LDL  57  72  93   TRIG  164*  177*  232*   CHOLHDLRATIO   --   2.8  3.8     Liver Function Studies -   Recent Labs   Lab Test  13   0826   PROTTOTAL  7.2   ALBUMIN  4.0   BILITOTAL  0.5   ALKPHOS  74   AST  20   ALT  30     Lab Results   Component Value Date    UCRR 25 2017    MICROL <5 2017    UMALCR Unable to calculate due to low value 2017     Last Basic Metabolic Panel:  Lab Results   Component Value Date     2017      Lab Results   Component Value Date    POTASSIUM 4.1 2017      Lab Results   Component Value Date    CHLORIDE 105 02/08/2017     Lab Results   Component Value Date    BUN 11 02/08/2017     Lab Results   Component Value Date    CR 0.64 02/08/2017     GFR Estimate   Date Value Ref Range Status   02/08/2017 >90  Non  GFR Calc   >60 mL/min/1.7m2 Final   04/27/2016 >90  Non  GFR Calc   >60 mL/min/1.7m2 Final   09/02/2015 >90  Non  GFR Calc   >60 mL/min/1.7m2 Final     TSH   Date Value Ref Range Status   05/12/2017 2.38 0.40 - 4.00 mU/L Final     Most Recent Immunizations   Administered Date(s) Administered     HepB 09/14/2016     Influenza (High Dose) 3 valent vaccine 10/04/2017     Influenza (IIV3) PF 10/03/2012     Pneumo Conj 13-V (2010&after) 11/03/2014     Pneumococcal (PCV 7) 04/12/2006     Pneumococcal 23 valent 02/25/2013     TDAP Vaccine (Boostrix) 02/25/2013     Tetanus 09/19/2005     Zoster vaccine, live 05/18/2009     ASSESSMENT:              Current medications were reviewed today as discussed above.      Medication Adherence: excellent, no issues identified    Diabetes: Improved. Patient is not meeting A1c goal of < 7%. Self monitoring of blood glucose is at goal of fasting  mg/dL and post4 prandial < 180 mg/dL. Pt would benefit from an updated A1c.     PLAN:                  1) A1c is already ordered.  Patient is asked to make a lab only appointment in the next 4 weeks to update A1c.     I spent 15 minutes with this patient today. All changes were made via collaborative practice agreement with Maryam Clayton. A copy of the visit note was provided to the patient's primary care provider.     Will follow up when lab results return.    The patient declined a summary of these recommendations as an after visit summary.    Cassandra Murdock, Pharm.D., [facility]  Medication Therapy Management Pharmacist  Page/VM:  [phone]

## 2018-03-15 NOTE — MR AVS SNAPSHOT
"              After Visit Summary   3/15/2018    Lindsay Nieves    MRN: [id number]           Patient Information     Date Of Birth          1945        Visit Information        Provider Department      3/15/2018 8:30 AM Cassandra Murdock RPH M [facility] Medication Therapy Management        Today's Diagnoses     Type 2 diabetes mellitus with diabetic neuropathy, without long-term current use of insulin (H)    -  1       Follow-ups after your visit        Your next 10 appointments already scheduled     Mar 15, 2018  8:30 AM CDT   TELEMEDICINE with ЕЛЕНА Freitas [facility] Medication Therapy Management ([facility])    [address]  4th Floor  [facility] [address] [phone]           Note: this is not an onsite visit; there is no need to come to the facility.              Who to contact     If you have questions or need follow up information about today's clinic visit or your schedule please contact [facility] MEDICATION THERAPY MANAGEMENT directly at [phone].  Normal or non-critical lab and imaging results will be communicated to you by Vignohart, letter or phone within 4 business days after the clinic has received the results. If you do not hear from us within 7 days, please contact the clinic through [employer] or phone. If you have a critical or abnormal lab result, we will notify you by phone as soon as possible.  Submit refill requests through Shop2 or call your pharmacy and they will forward the refill request to us. Please allow 3 business days for your refill to be completed.          Additional Information About Your Visit        Shop2 Information     Shop2 lets you send messages to your doctor, view your test results, renew your prescriptions, schedule appointments and more. To sign up, go to [email or web address] . Click on \"Log in\" on the left side of the screen, which will take you to the Welcome page. Then click on \"Sign up Now\" on the " right side of the page.     You will be asked to enter the access code listed below, as well as some personal information. Please follow the directions to create your username and password.     Your access code is: [email or web address]  Expires: 2018 10:10 AM     Your access code will  in 90 days. If you need help or a new code, please call your [place] clinic or [phone].        Care EveryWhere ID     This is your Care EveryWhere ID. This could be used by other organizations to access your [place] medical records  [id number]         Blood Pressure from Last 3 Encounters:   18 130/80   17 123/67   17 120/75    Weight from Last 3 Encounters:   17 195 lb (88.5 kg)   16 215 lb (97.5 kg)   16 215 lb (97.5 kg)              Today, you had the following     No orders found for display       Primary Care Provider Office Phone # Fax #    Maryam Clayton MD PhD [phone]       [address] [place] [id number]        Equal Access to Services     [facility]: Hadii aad ku hadasho Solele, waaxda luqadaha, qaybta kaalmada adeegyada, cullen trujillo . So [facility] [phone].    ATENCIÓN: Si habla español, tiene a resendiz disposición servicios gratuitos de asistencia lingüística. Llame al [phone].    We comply with applicable federal civil rights laws and [place] laws. We do not discriminate on the basis of race, color, national origin, age, disability, sex, sexual orientation, or gender identity.            Thank you!     Thank you for choosing [facility] MEDICATION THERAPY MANAGEMENT  for your care. Our goal is always to provide you with excellent care. Hearing back from our patients is one way we can continue to improve our services. Please take a few minutes to complete the written survey that you may receive in the mail after your visit with us. Thank you!             Your Updated Medication List - Protect others around  you: Learn how to safely use, store and throw away your medicines at www.disposemymeds.org.          This list is accurate as of 3/15/18  8:24 AM.  Always use your most recent med list.                   Brand Name Dispense Instructions for use Diagnosis    acetaminophen 500 MG tablet    TYLENOL     Take 500-1,000 mg by mouth every 6 hours as needed for mild pain        ADVIL 200 MG tablet   Generic drug:  ibuprofen      Take 200 mg by mouth every 4 hours as needed.        aspirin 81 MG tablet      Take 81 mg by mouth daily        blood glucose monitoring lancets     1 Box    Use to test blood sugar 2-3 times daily or as directed.  Ok to substitute alternative if insurance prefers.    Type 2 diabetes mellitus with diabetic neuropathy, without long-term current use of insulin (H)       blood glucose monitoring meter device kit     1 kit    Use to test blood sugar 2-3 times daily or as directed.  Ok to substitute alternative if insurance prefers.        blood glucose monitoring test strip    ACCU-CHEK CORTEZ PLUS    1 Box    Use to test blood sugar 2-3 times daily or as directed.  Ok to substitute alternative if insurance prefers.    Type 2 diabetes mellitus with diabetic neuropathy, without long-term current use of insulin (H)       calcium-vitamin D-vitamin K 500-500-40 MG-UNT-MCG Chew    VIACTIV     Take 1 tablet by mouth daily        FLUoxetine 10 MG capsule    PROzac    180 capsule    Take 2 capsules (20 mg) by mouth daily    Major depressive disorder, recurrent episode, mild (H)       glipiZIDE 10 MG 24 hr tablet    glipiZIDE XL    180 tablet    Take 2 tablets (20 mg) by mouth daily        insulin degludec-liraglutide pen    XULTOPHY 100/3.6    15 mL    Inject 18 Units Subcutaneous daily titrating as directed    Type 2 diabetes mellitus with diabetic neuropathy, unspecified long term insulin use status (H)       insulin pen needle 31G X 8 MM     90 each    Use with Victoza injections once daily.    Type 2  diabetes mellitus with other specified complication, with long-term current use of insulin (H)       metFORMIN 1000 MG tablet    GLUCOPHAGE    180 tablet    Take 1 tablet (1,000 mg) by mouth 2 times daily (with meals)    Type 2 diabetes mellitus with complication, without long-term current use of insulin (H)       MULTIVITAMIN WOMEN 50+ PO      Take 1 tablet by mouth daily        nitroGLYcerin 0.4 MG sublingual tablet    NITROSTAT    25 tablet    For chest pain place 1 tablet under the tongue every 5 minutes for 3 doses. If symptoms persist 5 minutes after 1st dose call 911.    Chest tightness or pressure       pravastatin 40 MG tablet    PRAVACHOL    180 tablet    Take 2 tablets (80 mg) by mouth daily (please dispense as 40mg tabs)    Hyperlipidemia LDL goal <100       vitamin D 2000 UNITS tablet      Take 1 tablet by mouth daily.

## 2018-04-03 ENCOUNTER — TELEPHONE (OUTPATIENT)
Dept: PHARMACY | Facility: CLINIC | Age: 73
End: 2018-04-03

## 2018-04-03 NOTE — TELEPHONE ENCOUNTER
Lindsay calls this morning to let me know she took all of her medications at once.  She is wondering if this is okay.  She reports having a glass of orange juice along with toast and peanut butter for breakfast this morning. She tells me that Vargas's condition is not improving and she has been absent minded recently. I assure her that taking all of her medications at once, in her situation, will not harm her. However, she may have some stomach upset and she should be diligent in s/sx of hypoglycemia today.      She is working hard to help Vargas improve his health.  She will call when she has more time to focus on herself again.      Cassandra Murdock, Pharm.D., [facility]  Medication Therapy Management Pharmacist  Page/VM:  [phone]

## 2018-05-10 ENCOUNTER — ALLIED HEALTH/NURSE VISIT (OUTPATIENT)
Dept: PHARMACY | Facility: CLINIC | Age: 73
End: 2018-05-10
Payer: COMMERCIAL

## 2018-05-10 DIAGNOSIS — E11.40 TYPE 2 DIABETES MELLITUS WITH DIABETIC NEUROPATHY, WITHOUT LONG-TERM CURRENT USE OF INSULIN (H): Primary | ICD-10-CM

## 2018-05-10 PROCEDURE — 99606 MTMS BY PHARM EST 15 MIN: CPT | Mod: U4 | Performed by: PHARMACIST

## 2018-05-10 NOTE — PROGRESS NOTES
SUBJECTIVE/OBJECTIVE:                Lindsay Nieves is a 72 year old female called for a follow-up visit for Medication Therapy Management.  She was referred to me from Dr. Clayton.     Chief Complaint: Follow up from our visit on 3/15/18.      Tobacco: History of tobacco dependence - quit   Alcohol: not currently using    Medication Adherence/Access:  no issues reported    Diabetes:  Pt currently taking Xultophy 18 units daily, glipizide XL 20 mg daily, metformin 1000 mg twice daily. Pt is not experiencing side effects. Lindsay's , Vargas, has recently been discharged from a transitional care facility after a fall in February.  She is still feeling quite overwhelmed with his care and has not been able to test her blood sugars very often.   SMB times a week.   Ranges (patient reported):     FP mg/dL, no other measurements to share    Patient is not experiencing hypoglycemia  Recent symptoms of high blood sugar? none  Eye exam: up to date  Foot exam: up to date  Microalbumin is < 30 mg/g. Pt is not taking an ACEi/ARB.  Aspirin: Taking 81mg daily and denies side effects    Current labs include:  Today's Vitals: There were no vitals taken for this visit. - telelmed     BP Readings from Last 3 Encounters:   18 130/80   17 123/67   17 120/75     Lab Results   Component Value Date    A1C 8.4 2017    A1C 9.5 2016    A1C 7.5 2016    A1C 8.7 2016    A1C 7.8 2015     Recent Labs   Lab Test  16   1027  09/02/15   0909  14   0900   CHOL  153  168  189   HDL  63  60  49*   LDL  57  72  93   TRIG  164*  177*  232*   CHOLHDLRATIO   --   2.8  3.8     Liver Function Studies -   Recent Labs   Lab Test  13   0826   PROTTOTAL  7.2   ALBUMIN  4.0   BILITOTAL  0.5   ALKPHOS  74   AST  20   ALT  30     Lab Results   Component Value Date    UCRR 25 2017    MICROL <5 2017    UMALCR Unable to calculate due to low value 2017     Last Basic  Metabolic Panel:  Lab Results   Component Value Date     02/08/2017      Lab Results   Component Value Date    POTASSIUM 4.1 02/08/2017     Lab Results   Component Value Date    CHLORIDE 105 02/08/2017     Lab Results   Component Value Date    BUN 11 02/08/2017     Lab Results   Component Value Date    CR 0.64 02/08/2017     GFR Estimate   Date Value Ref Range Status   02/08/2017 >90  Non  GFR Calc   >60 mL/min/1.7m2 Final   04/27/2016 >90  Non  GFR Calc   >60 mL/min/1.7m2 Final   09/02/2015 >90  Non  GFR Calc   >60 mL/min/1.7m2 Final     Most Recent Immunizations   Administered Date(s) Administered     HepB 09/14/2016     Influenza (High Dose) 3 valent vaccine 10/04/2017     Influenza (IIV3) PF 10/03/2012     Pneumo Conj 13-V (2010&after) 11/03/2014     Pneumococcal (PCV 7) 04/12/2006     Pneumococcal 23 valent 02/25/2013     TDAP Vaccine (Boostrix) 02/25/2013     Tetanus 09/19/2005     Zoster vaccine, live 05/18/2009       ASSESSMENT:              Current medications were reviewed today as discussed above.      Medication Adherence: good, no issues identified    Diabetes: Needs Improvement. Patient is not meeting A1c goal of < 8%. Self monitoring of blood glucose is not at goal of monitoring every morning. Pt would benefit from checking her blood sugars every morning and an updated A1c.    PLAN:                  1) Monitor blood sugars every day in the morning for the next 7 days, A1c ordered.     I spent 15 minutes with this patient today. A copy of the visit note was provided to the patient's primary care provider.     Will follow up in 1 week.    The patient declined a summary of these recommendations as an after visit summary.    Cassandra Murdock, Pharm.D., [facility]  Medication Therapy Management Pharmacist  Page/VM:  [phone]

## 2018-05-10 NOTE — MR AVS SNAPSHOT
After Visit Summary   5/10/2018    Lindsay Nieves    MRN: [id number]           Patient Information     Date Of Birth          1945        Visit Information        Provider Department      5/10/2018 8:30 AM Cassandra Murdock RPH [facility] Medication Therapy Management        Today's Diagnoses     Type 2 diabetes mellitus with diabetic neuropathy, without long-term current use of insulin (H)    -  1       Follow-ups after your visit        Your next 10 appointments already scheduled     May 18, 2018  9:00 AM CDT   TELEMEDICINE with Cassandra Murdock RPH   [facility] Medication Therapy Management ([facility] [place])    [address]  4th Floor  [facility] [address] [phone]           Note: this is not an onsite visit; there is no need to come to the facility.              Future tests that were ordered for you today     Open Future Orders        Priority Expected Expires Ordered    Hemoglobin A1c Routine  5/10/2019 5/10/2018            Who to contact     If you have questions or need follow up information about today's clinic visit or your schedule please contact [facility] MEDICATION THERAPY MANAGEMENT directly at [phone].  Normal or non-critical lab and imaging results will be communicated to you by Brightleafhart, letter or phone within 4 business days after the clinic has received the results. If you do not hear from us within 7 days, please contact the clinic through Brightleafhart or phone. If you have a critical or abnormal lab result, we will notify you by phone as soon as possible.  Submit refill requests through [email or web address] or call your pharmacy and they will forward the refill request to us. Please allow 3 business days for your refill to be completed.          Additional Information About Your Visit        Works.io Information     Works.io lets you send messages to your doctor, view your test results, renew your prescriptions, schedule appointments and more. To sign  "up, go to [email or web address] . Click on \"Log in\" on the left side of the screen, which will take you to the Welcome page. Then click on \"Sign up Now\" on the right side of the page.     You will be asked to enter the access code listed below, as well as some personal information. Please follow the directions to create your username and password.     Your access code is: [email or web address]  Expires: 2018 10:10 AM     Your access code will  in 90 days. If you need help or a new code, please call your [facility] clinic or [phone].        Care EveryWhere ID     This is your Care EveryWhere ID. This could be used by other organizations to access your [facility] medical records  [id number]         Blood Pressure from Last 3 Encounters:   18 130/80   17 123/67   17 120/75    Weight from Last 3 Encounters:   17 195 lb (88.5 kg)   16 215 lb (97.5 kg)   16 215 lb (97.5 kg)               Primary Care Provider Office Phone # Fax #    Maryam Clayton MD PhD [phone]       [address] [place] [id number]        Equal Access to Services     [facility] AH: Hadii adrián kendrick hadasho Sonicoleali, waaxda luqadaha, qaybta kaalmada adeegyada, cullen roberts haytroy trujillo . So [facility] [phone].    ATENCIÓN: Si habla español, tiene a resendiz disposición servicios gratuitos de asistencia lingüística. Llame al [phone].    We comply with applicable federal civil rights laws and [place] laws. We do not discriminate on the basis of race, color, national origin, age, disability, sex, sexual orientation, or gender identity.            Thank you!     Thank you for choosing [facility] MEDICATION THERAPY MANAGEMENT  for your care. Our goal is always to provide you with excellent care. Hearing back from our patients is one way we can continue to improve our services. Please take a few minutes to complete the written survey that you may receive in the mail after " your visit with us. Thank you!             Your Updated Medication List - Protect others around you: Learn how to safely use, store and throw away your medicines at www.disposemymeds.org.          This list is accurate as of 5/10/18  8:47 AM.  Always use your most recent med list.                   Brand Name Dispense Instructions for use Diagnosis    acetaminophen 500 MG tablet    TYLENOL     Take 500-1,000 mg by mouth every 6 hours as needed for mild pain        ADVIL 200 MG tablet   Generic drug:  ibuprofen      Take 200 mg by mouth every 4 hours as needed.        aspirin 81 MG tablet      Take 81 mg by mouth daily        blood glucose monitoring lancets     1 Box    Use to test blood sugar 2-3 times daily or as directed.  Ok to substitute alternative if insurance prefers.    Type 2 diabetes mellitus with diabetic neuropathy, without long-term current use of insulin (H)       blood glucose monitoring meter device kit     1 kit    Use to test blood sugar 2-3 times daily or as directed.  Ok to substitute alternative if insurance prefers.        blood glucose monitoring test strip    ACCU-CHEK CORTEZ PLUS    1 Box    Use to test blood sugar 2-3 times daily or as directed.  Ok to substitute alternative if insurance prefers.    Type 2 diabetes mellitus with diabetic neuropathy, without long-term current use of insulin (H)       calcium-vitamin D-vitamin K 500-500-40 MG-UNT-MCG Chew    VIACTIV     Take 1 tablet by mouth daily        FLUoxetine 10 MG capsule    PROzac    180 capsule    Take 2 capsules (20 mg) by mouth daily    Major depressive disorder, recurrent episode, mild (H)       glipiZIDE 10 MG 24 hr tablet    glipiZIDE XL    180 tablet    Take 2 tablets (20 mg) by mouth daily        insulin degludec-liraglutide pen    XULTOPHY 100/3.6    15 mL    Inject 18 Units Subcutaneous daily titrating as directed    Type 2 diabetes mellitus with diabetic neuropathy, unspecified long term insulin use status (H)        insulin pen needle 31G X 8 MM     90 each    Use with Victoza injections once daily.    Type 2 diabetes mellitus with other specified complication, with long-term current use of insulin (H)       metFORMIN 1000 MG tablet    GLUCOPHAGE    180 tablet    Take 1 tablet (1,000 mg) by mouth 2 times daily (with meals)    Type 2 diabetes mellitus with complication, without long-term current use of insulin (H)       MULTIVITAMIN WOMEN 50+ PO      Take 1 tablet by mouth daily        nitroGLYcerin 0.4 MG sublingual tablet    NITROSTAT    25 tablet    For chest pain place 1 tablet under the tongue every 5 minutes for 3 doses. If symptoms persist 5 minutes after 1st dose call 911.    Chest tightness or pressure       pravastatin 40 MG tablet    PRAVACHOL    180 tablet    Take 2 tablets (80 mg) by mouth daily (please dispense as 40mg tabs)    Hyperlipidemia LDL goal <100       vitamin D 2000 units tablet      Take 1 tablet by mouth daily.

## 2018-05-18 ENCOUNTER — ALLIED HEALTH/NURSE VISIT (OUTPATIENT)
Dept: PHARMACY | Facility: CLINIC | Age: 73
End: 2018-05-18
Payer: COMMERCIAL

## 2018-05-18 DIAGNOSIS — E11.40 TYPE 2 DIABETES MELLITUS WITH DIABETIC NEUROPATHY, WITHOUT LONG-TERM CURRENT USE OF INSULIN (H): Primary | ICD-10-CM

## 2018-05-18 PROCEDURE — 99606 MTMS BY PHARM EST 15 MIN: CPT | Mod: U4 | Performed by: PHARMACIST

## 2018-05-18 NOTE — MR AVS SNAPSHOT
"              After Visit Summary   2018    Lindsay Nieves    MRN: [id number]           Patient Information     Date Of Birth          1945        Visit Information        Provider Department      2018 9:00 AM Cassandra Murdock RPH [facility] Medication Therapy Management        Today's Diagnoses     Type 2 diabetes mellitus with diabetic neuropathy, without long-term current use of insulin (H)    -  1       Follow-ups after your visit        Who to contact     If you have questions or need follow up information about today's clinic visit or your schedule please contact [facility] MEDICATION THERAPY MANAGEMENT directly at [phone].  Normal or non-critical lab and imaging results will be communicated to you by Asanahart, letter or phone within 4 business days after the clinic has received the results. If you do not hear from us within 7 days, please contact the clinic through Asanahart or phone. If you have a critical or abnormal lab result, we will notify you by phone as soon as possible.  Submit refill requests through Tvoop or call your pharmacy and they will forward the refill request to us. Please allow 3 business days for your refill to be completed.          Additional Information About Your Visit        MyChart Information     Tvoop lets you send messages to your doctor, view your test results, renew your prescriptions, schedule appointments and more. To sign up, go to [email or web address] . Click on \"Log in\" on the left side of the screen, which will take you to the Welcome page. Then click on \"Sign up Now\" on the right side of the page.     You will be asked to enter the access code listed below, as well as some personal information. Please follow the directions to create your username and password.     Your access code is: [email or web address]  Expires: 2018  9:15 AM     Your access code will  in 90 days. If you need help or a new code, please call your [place] clinic or " [phone].        Care EveryWhere ID     This is your Care EveryWhere ID. This could be used by other organizations to access your [place] medical records  [id number]         Blood Pressure from Last 3 Encounters:   01/24/18 130/80   11/29/17 123/67   05/17/17 120/75    Weight from Last 3 Encounters:   03/16/17 195 lb (88.5 kg)   05/12/16 215 lb (97.5 kg)   04/27/16 215 lb (97.5 kg)              Today, you had the following     No orders found for display       Primary Care Provider Office Phone # Fax #    Maryam Clayton MD PhD [phone]       [address] [place] [address]        Equal Access to Services     BRIE AHMADI : James kuoo Sonicoleali, waaxda luqadaha, qaybta kaalmada adeegyada, cullen trujillo . So [facility] [phone].    ATENCIÓN: Si habla español, tiene a resendiz disposición servicios gratuitos de asistencia lingüística. [facility] [phone].    We comply with applicable federal civil rights laws and [place] laws. We do not discriminate on the basis of race, color, national origin, age, disability, sex, sexual orientation, or gender identity.            Thank you!     Thank you for choosing [facility] MEDICATION THERAPY MANAGEMENT  for your care. Our goal is always to provide you with excellent care. Hearing back from our patients is one way we can continue to improve our services. Please take a few minutes to complete the written survey that you may receive in the mail after your visit with us. Thank you!             Your Updated Medication List - Protect others around you: Learn how to safely use, store and throw away your medicines at www.disposemymeds.org.          This list is accurate as of 5/18/18  9:15 AM.  Always use your most recent med list.                   Brand Name Dispense Instructions for use Diagnosis    acetaminophen 500 MG tablet    TYLENOL     Take 500-1,000 mg by mouth every 6 hours as needed for mild pain         ADVIL 200 MG tablet   Generic drug:  ibuprofen      Take 200 mg by mouth every 4 hours as needed.        aspirin 81 MG tablet      Take 81 mg by mouth daily        blood glucose monitoring lancets     1 Box    Use to test blood sugar 2-3 times daily or as directed.  Ok to substitute alternative if insurance prefers.    Type 2 diabetes mellitus with diabetic neuropathy, without long-term current use of insulin (H)       blood glucose monitoring meter device kit     1 kit    Use to test blood sugar 2-3 times daily or as directed.  Ok to substitute alternative if insurance prefers.        blood glucose monitoring test strip    ACCU-CHEK CORTEZ PLUS    1 Box    Use to test blood sugar 2-3 times daily or as directed.  Ok to substitute alternative if insurance prefers.    Type 2 diabetes mellitus with diabetic neuropathy, without long-term current use of insulin (H)       calcium-vitamin D-vitamin K 500-500-40 MG-UNT-MCG Chew    VIACTIV     Take 1 tablet by mouth daily        FLUoxetine 10 MG capsule    PROzac    180 capsule    Take 2 capsules (20 mg) by mouth daily    Major depressive disorder, recurrent episode, mild (H)       glipiZIDE 10 MG 24 hr tablet    glipiZIDE XL    180 tablet    Take 2 tablets (20 mg) by mouth daily        insulin degludec-liraglutide pen    XULTOPHY 100/3.6    15 mL    Inject 18 Units Subcutaneous daily titrating as directed    Type 2 diabetes mellitus with diabetic neuropathy, unspecified long term insulin use status (H)       insulin pen needle 31G X 8 MM     90 each    Use with Victoza injections once daily.    Type 2 diabetes mellitus with other specified complication, with long-term current use of insulin (H)       metFORMIN 1000 MG tablet    GLUCOPHAGE    180 tablet    Take 1 tablet (1,000 mg) by mouth 2 times daily (with meals)    Type 2 diabetes mellitus with complication, without long-term current use of insulin (H)       MULTIVITAMIN WOMEN 50+ PO      Take 1 tablet by mouth daily         nitroGLYcerin 0.4 MG sublingual tablet    NITROSTAT    25 tablet    For chest pain place 1 tablet under the tongue every 5 minutes for 3 doses. If symptoms persist 5 minutes after 1st dose call 911.    Chest tightness or pressure       pravastatin 40 MG tablet    PRAVACHOL    180 tablet    Take 2 tablets (80 mg) by mouth daily (please dispense as 40mg tabs)    Hyperlipidemia LDL goal <100       vitamin D 2000 units tablet      Take 1 tablet by mouth daily.

## 2018-05-18 NOTE — PROGRESS NOTES
SUBJECTIVE/OBJECTIVE:                Lindsay Nieves is a 72 year old female called for a follow-up visit for Medication Therapy Management.  She was referred to me from Dr. Clayton. Her 's recover after his fall is still quite slow and she is feeling somewhat overwhelmed with his care today.     Chief Complaint: Follow up from our visit on 5/10/18.  We are following up on diabetes today    Tobacco: History of tobacco dependence - quit   Alcohol: not currently using    Medication Adherence/Access:  no issues reported    Diabetes:  Pt currently taking Xultophy 18 units daily, glipizide XL 20 mg daily and metformin 1000 mg twice daily. Pt is not experiencing side effects.  SMBG: two times daily.   Ranges (from patient's glucose log):              AM  PM  5/10 114   111, 156   87,   138   109, 73 (no Xultophy)   164, 105  5/15 108   138    103, 140   117    FP/9 (89%) at goal of  mg/dL  HS: 5/5 (100%) at goal of <180 mg/dL    Patient is not experiencing hypoglycemia  Recent symptoms of high blood sugar? none  Eye exam: up to date  Foot exam: due  Microalbumin is < 30 mg/g. Pt is not taking an ACEi/ARB.  Aspirin: Taking 81mg daily and denies side effects    Current labs include:  Today's Vitals: There were no vitals taken for this visit. - telemed     BP Readings from Last 3 Encounters:   18 130/80   17 123/67   17 120/75     Lab Results   Component Value Date    A1C 8.4 2017    A1C 9.5 2016    A1C 7.5 2016    A1C 8.7 2016    A1C 7.8 2015     Recent Labs   Lab Test  16   1027  09/02/15   0909  14   0900   CHOL  153  168  189   HDL  63  60  49*   LDL  57  72  93   TRIG  164*  177*  232*   CHOLHDLRATIO   --   2.8  3.8     Liver Function Studies -   Recent Labs   Lab Test  13   0826   PROTTOTAL  7.2   ALBUMIN  4.0   BILITOTAL  0.5   ALKPHOS  74   AST  20   ALT  30     Lab Results   Component Value Date    UCRR  25 05/12/2017    MICROL <5 05/12/2017    UMALCR Unable to calculate due to low value 05/12/2017     Last Basic Metabolic Panel:  Lab Results   Component Value Date     02/08/2017      Lab Results   Component Value Date    POTASSIUM 4.1 02/08/2017     Lab Results   Component Value Date    CHLORIDE 105 02/08/2017     Lab Results   Component Value Date    BUN 11 02/08/2017     Lab Results   Component Value Date    CR 0.64 02/08/2017     GFR Estimate   Date Value Ref Range Status   02/08/2017 >90  Non  GFR Calc   >60 mL/min/1.7m2 Final   04/27/2016 >90  Non  GFR Calc   >60 mL/min/1.7m2 Final   09/02/2015 >90  Non  GFR Calc   >60 mL/min/1.7m2 Final     Most Recent Immunizations   Administered Date(s) Administered     HepB 09/14/2016     Influenza (High Dose) 3 valent vaccine 10/04/2017     Influenza (IIV3) PF 10/03/2012     Pneumo Conj 13-V (2010&after) 11/03/2014     Pneumococcal (PCV 7) 04/12/2006     Pneumococcal 23 valent 02/25/2013     TDAP Vaccine (Boostrix) 02/25/2013     Tetanus 09/19/2005     Zoster vaccine, live 05/18/2009       ASSESSMENT:              Current medications were reviewed today as discussed above.      Medication Adherence: good, no issues identified    Diabetes: Stable. Patient is not meeting A1c goal of < 8%. Self monitoring of blood glucose is at goal of fasting  mg/dL and post prandial < 180 mg/dL. Pt would benefit from an updated A1c as her last measurement is 1 year old.  If A1c results are favorable, it makes sense to optimize Xultophy dose and slowly reduce/eliminate glipizide at this time to reduce polypharmacy and mitigate side effects (weight gain, hypoglycemia) associated with Jez.      PLAN:                  1) Get A1c drawn when you come into clinic on 5/30.     I spent 15 minutes with this patient today. All changes were made via collaborative practice agreement with Maryam Clayton. A copy of the visit note was  provided to the patient's primary care provider.     Will follow up 2 weeks when labs return.    The patient declined a summary of these recommendations as an after visit summary.    Cassandra Murdock, Pharm.D., [facility]  Medication Therapy Management Pharmacist  Page/VM:  [phone]

## 2018-05-30 DIAGNOSIS — E11.40 TYPE 2 DIABETES MELLITUS WITH DIABETIC NEUROPATHY, WITHOUT LONG-TERM CURRENT USE OF INSULIN (H): ICD-10-CM

## 2018-05-30 LAB — HBA1C MFR BLD: 7 % (ref 0–5.6)

## 2018-05-31 ENCOUNTER — TELEPHONE (OUTPATIENT)
Dept: PHARMACY | Facility: CLINIC | Age: 73
End: 2018-05-31

## 2018-05-31 NOTE — TELEPHONE ENCOUNTER
I called Lindsay today to share her A1c results.  Given excellent A1c improvement, no changes are warranted. Plan to follow-up in 1 month for blood sugar check and to determine whether patient wants to transition off of glipizide and optimize Xultophy.    Cassandra Murdock, Pharm.D., [facility]  Medication Therapy Management Pharmacist  Page/VM:  [phone]

## 2018-06-28 ENCOUNTER — ALLIED HEALTH/NURSE VISIT (OUTPATIENT)
Dept: PHARMACY | Facility: CLINIC | Age: 73
End: 2018-06-28
Payer: COMMERCIAL

## 2018-06-28 DIAGNOSIS — E11.40 TYPE 2 DIABETES MELLITUS WITH DIABETIC NEUROPATHY, WITHOUT LONG-TERM CURRENT USE OF INSULIN (H): Primary | ICD-10-CM

## 2018-06-28 PROCEDURE — 99606 MTMS BY PHARM EST 15 MIN: CPT | Mod: U4 | Performed by: PHARMACIST

## 2018-06-28 NOTE — PROGRESS NOTES
SUBJECTIVE/OBJECTIVE:                Lindsay Nieves is a 72 year old female called for a follow-up visit for Medication Therapy Management.  She was referred to me from Dr. Clayton.     Chief Complaint: Follow up from our visit on 18.      Tobacco: History of tobacco dependence - quit   Alcohol: not currently using    Medication Adherence/Access:  no issues reported    Diabetes:  Pt currently taking Xultophy 18 units daily, glipizide XL 20 mg daily, metformin 1000 mg twice daily. Pt is not experiencing side effects.  SMBG: two times daily.   Ranges (patient reported):     FP-120's  HS: 130-140s, occasionally above 200 but she can always trace that back to something ate    Patient is not experiencing hypoglycemia  Recent symptoms of high blood sugar? none  Eye exam: up to date  Foot exam: due  Microalbumin is < 30 mg/g. Pt is not taking an ACEi/ARB.  Aspirin: Taking 81mg daily and denies side effects  Diet/Exercise: No changes.     Current labs include:  Today's Vitals: There were no vitals taken for this visit. - telemed     BP Readings from Last 3 Encounters:   18 130/80   17 123/67   17 120/75     Lab Results   Component Value Date    A1C 7.0 2018    A1C 8.4 2017    A1C 9.5 2016    A1C 7.5 2016    A1C 8.7 2016     Recent Labs   Lab Test  16   1027  09/02/15   0909  14   0900   CHOL  153  168  189   HDL  63  60  49*   LDL  57  72  93   TRIG  164*  177*  232*   CHOLHDLRATIO   --   2.8  3.8     Liver Function Studies -   Recent Labs   Lab Test  13   0826   PROTTOTAL  7.2   ALBUMIN  4.0   BILITOTAL  0.5   ALKPHOS  74   AST  20   ALT  30     Lab Results   Component Value Date    UCRR 25 2017    MICROL <5 2017    UMALCR Unable to calculate due to low value 2017     Last Basic Metabolic Panel:  Lab Results   Component Value Date     2017      Lab Results   Component Value Date    POTASSIUM 4.1 2017     Lab  Results   Component Value Date    CHLORIDE 105 02/08/2017     Lab Results   Component Value Date    BUN 11 02/08/2017     Lab Results   Component Value Date    CR 0.64 02/08/2017     GFR Estimate   Date Value Ref Range Status   02/08/2017 >90  Non  GFR Calc   >60 mL/min/1.7m2 Final   04/27/2016 >90  Non  GFR Calc   >60 mL/min/1.7m2 Final   09/02/2015 >90  Non  GFR Calc   >60 mL/min/1.7m2 Final     Most Recent Immunizations   Administered Date(s) Administered     HepB 09/14/2016     Influenza (High Dose) 3 valent vaccine 10/04/2017     Influenza (IIV3) PF 10/03/2012     Pneumo Conj 13-V (2010&after) 11/03/2014     Pneumococcal (PCV 7) 04/12/2006     Pneumococcal 23 valent 02/25/2013     TDAP Vaccine (Boostrix) 02/25/2013     Tetanus 09/19/2005     Zoster vaccine, live 05/18/2009       ASSESSMENT:              Current medications were reviewed today as discussed above.      Medication Adherence: excellent, no issues identified    Diabetes: Stable. Patient is meeting A1c goal of < 8%.     PLAN:                  No recommendations    I spent 15 minutes with this patient today. A copy of the visit note was provided to the patient's primary care provider.     Will follow up in 3 months.    The patient declined a summary of these recommendations as an after visit summary.    Cassandra Murdock, Pharm.D., [facility]  Medication Therapy Management Pharmacist  Page/VM:  [phone]

## 2018-06-28 NOTE — MR AVS SNAPSHOT
"              After Visit Summary   2018    Lindsay Nieves    MRN: [id number]           Patient Information     Date Of Birth          1945        Visit Information        Provider Department      2018 8:30 AM Cassandra Murdock RPH [facility] Medication Therapy Management        Today's Diagnoses     Type 2 diabetes mellitus with diabetic neuropathy, without long-term current use of insulin (H)    -  1       Follow-ups after your visit        Who to contact     If you have questions or need follow up information about today's clinic visit or your schedule please contact [facility] MEDICATION THERAPY MANAGEMENT directly at [phone].  Normal or non-critical lab and imaging results will be communicated to you by [email or web address], letter or phone within 4 business days after the clinic has received the results. If you do not hear from us within 7 days, please contact the clinic through [email or web address] or phone. If you have a critical or abnormal lab result, we will notify you by phone as soon as possible.  Submit refill requests through [employer] or call your pharmacy and they will forward the refill request to us. Please allow 3 business days for your refill to be completed.          Additional Information About Your Visit        MyChart Information     [employer] lets you send messages to your doctor, view your test results, renew your prescriptions, schedule appointments and more. To sign up, go to [email or web address] . Click on \"Log in\" on the left side of the screen, which will take you to the Welcome page. Then click on \"Sign up Now\" on the right side of the page.     You will be asked to enter the access code listed below, as well as some personal information. Please follow the directions to create your username and password.     Your access code is: [email or web address]  Expires: 2018  9:15 AM     Your access code will  in 90 days. If you need help or a new code, please call your [place] clinic or " [phone].        Care EveryWhere ID     This is your Care EveryWhere ID. This could be used by other organizations to access your [place] medical records  [id number]         Blood Pressure from Last 3 Encounters:   01/24/18 130/80   11/29/17 123/67   05/17/17 120/75    Weight from Last 3 Encounters:   03/16/17 195 lb (88.5 kg)   05/12/16 215 lb (97.5 kg)   04/27/16 215 lb (97.5 kg)              Today, you had the following     No orders found for display       Primary Care Provider Office Phone # Fax #    Maryam Clayton MD PhD [phone]       [address] [place] [address]        Equal Access to Services     BRIE AHMADI : James kuoo Sonicoleali, waaxda luqadaha, qaybta kaalmada adeegyada, cullen trujillo . So [facility] [phone].    ATENCIÓN: Si habla español, tiene a resendiz disposición servicios gratuitos de asistencia lingüística. LlSt[facility] [phone].    We comply with applicable federal civil rights laws and [place] laws. We do not discriminate on the basis of race, color, national origin, age, disability, sex, sexual orientation, or gender identity.            Thank you!     Thank you for choosing [facility] MEDICATION THERAPY MANAGEMENT  for your care. Our goal is always to provide you with excellent care. Hearing back from our patients is one way we can continue to improve our services. Please take a few minutes to complete the written survey that you may receive in the mail after your visit with us. Thank you!             Your Updated Medication List - Protect others around you: Learn how to safely use, store and throw away your medicines at www.disposemymeds.org.          This list is accurate as of 6/28/18  8:48 AM.  Always use your most recent med list.                   Brand Name Dispense Instructions for use Diagnosis    acetaminophen 500 MG tablet    TYLENOL     Take 500-1,000 mg by mouth every 6 hours as needed for mild pain         ADVIL 200 MG tablet   Generic drug:  ibuprofen      Take 200 mg by mouth every 4 hours as needed.        aspirin 81 MG tablet      Take 81 mg by mouth daily        blood glucose monitoring lancets     1 Box    Use to test blood sugar 2-3 times daily or as directed.  Ok to substitute alternative if insurance prefers.    Type 2 diabetes mellitus with diabetic neuropathy, without long-term current use of insulin (H)       blood glucose monitoring meter device kit     1 kit    Use to test blood sugar 2-3 times daily or as directed.  Ok to substitute alternative if insurance prefers.        blood glucose monitoring test strip    ACCU-CHEK CORTEZ PLUS    1 Box    Use to test blood sugar 2-3 times daily or as directed.  Ok to substitute alternative if insurance prefers.    Type 2 diabetes mellitus with diabetic neuropathy, without long-term current use of insulin (H)       calcium-vitamin D-vitamin K 500-500-40 MG-UNT-MCG Chew    VIACTIV     Take 1 tablet by mouth daily        FLUoxetine 10 MG capsule    PROzac    180 capsule    Take 2 capsules (20 mg) by mouth daily    Major depressive disorder, recurrent episode, mild (H)       glipiZIDE 10 MG 24 hr tablet    glipiZIDE XL    180 tablet    Take 2 tablets (20 mg) by mouth daily        insulin degludec-liraglutide pen    XULTOPHY 100/3.6    15 mL    Inject 18 Units Subcutaneous daily titrating as directed    Type 2 diabetes mellitus with diabetic neuropathy, unspecified long term insulin use status (H)       insulin pen needle 31G X 8 MM     90 each    Use with Victoza injections once daily.    Type 2 diabetes mellitus with other specified complication, with long-term current use of insulin (H)       metFORMIN 1000 MG tablet    GLUCOPHAGE    180 tablet    Take 1 tablet (1,000 mg) by mouth 2 times daily (with meals)    Type 2 diabetes mellitus with complication, without long-term current use of insulin (H)       MULTIVITAMIN WOMEN 50+ PO      Take 1 tablet by mouth daily         nitroGLYcerin 0.4 MG sublingual tablet    NITROSTAT    25 tablet    For chest pain place 1 tablet under the tongue every 5 minutes for 3 doses. If symptoms persist 5 minutes after 1st dose call 911.    Chest tightness or pressure       pravastatin 40 MG tablet    PRAVACHOL    180 tablet    Take 2 tablets (80 mg) by mouth daily (please dispense as 40mg tabs)    Hyperlipidemia LDL goal <100       vitamin D 2000 units tablet      Take 1 tablet by mouth daily.

## 2018-07-12 ENCOUNTER — TELEPHONE (OUTPATIENT)
Dept: PHARMACY | Facility: CLINIC | Age: 73
End: 2018-07-12

## 2018-07-12 DIAGNOSIS — E11.40 TYPE 2 DIABETES MELLITUS WITH DIABETIC NEUROPATHY, UNSPECIFIED LONG TERM INSULIN USE STATUS: ICD-10-CM

## 2018-07-12 NOTE — TELEPHONE ENCOUNTER
Lindsay calls requesting a refill of Xultophy today. Her last A1c was within goal and there were no changes at our last visit.  This is refilled today with plans to follow-up in the fall.    Cassandra Murdock Pharm.D., [facility]  Medication Therapy Management Pharmacist  Page/VM:  [phone]

## 2018-09-11 ENCOUNTER — TELEPHONE (OUTPATIENT)
Dept: PHARMACY | Facility: CLINIC | Age: 73
End: 2018-09-11

## 2018-09-11 NOTE — TELEPHONE ENCOUNTER
I called Lindsay today to schedule MTM follow-up for diabetes. LVM asking her to call back when convenient.     Cassandra Murdock, Pharm.D., [facility]  Medication Therapy Management Pharmacist  Page/VM:  [phone]

## 2018-09-25 ENCOUNTER — TELEPHONE (OUTPATIENT)
Dept: PHARMACY | Facility: CLINIC | Age: 73
End: 2018-09-25

## 2018-09-25 NOTE — TELEPHONE ENCOUNTER
I called Lindsay again today to schedule MTM follow-up for diabetes. She is asked to call back as soon as convenient.     Cassandra Murdock, Pharm.D., [facility]  Medication Therapy Management Pharmacist  Page/VM:  [phone]

## 2018-11-09 ENCOUNTER — ALLIED HEALTH/NURSE VISIT (OUTPATIENT)
Dept: PHARMACY | Facility: CLINIC | Age: 73
End: 2018-11-09
Payer: COMMERCIAL

## 2018-11-09 ENCOUNTER — TELEPHONE (OUTPATIENT)
Dept: PHARMACY | Facility: CLINIC | Age: 73
End: 2018-11-09

## 2018-11-09 DIAGNOSIS — E11.40 TYPE 2 DIABETES MELLITUS WITH DIABETIC NEUROPATHY, WITHOUT LONG-TERM CURRENT USE OF INSULIN (H): Primary | ICD-10-CM

## 2018-11-09 PROCEDURE — 99606 MTMS BY PHARM EST 15 MIN: CPT | Mod: U4 | Performed by: PHARMACIST

## 2018-11-09 NOTE — MR AVS SNAPSHOT
After Visit Summary   11/9/2018    Lindsay Nieves    MRN: [id number]           Patient Information     Date Of Birth          1945        Visit Information        Provider Department      11/9/2018 8:30 AM Cassandra Murdock RPH [facility] Medication Therapy Management        Today's Diagnoses     Type 2 diabetes mellitus with diabetic neuropathy, without long-term current use of insulin (H)    -  1       Follow-ups after your visit        Who to contact     If you have questions or need follow up information about today's clinic visit or your schedule please contact [facility] MEDICATION THERAPY MANAGEMENT directly at [phone].  Normal or non-critical lab and imaging results will be communicated to you by MyChart, letter or phone within 4 business days after the clinic has received the results. If you do not hear from us within 7 days, please contact the clinic through MyChart or phone. If you have a critical or abnormal lab result, we will notify you by phone as soon as possible.  Submit refill requests through [employer] or call your pharmacy and they will forward the refill request to us. Please allow 3 business days for your refill to be completed.          Additional Information About Your Visit        Care EveryWhere ID     This is your Care EveryWhere ID. This could be used by other organizations to access your [place] medical records  [id number]         Blood Pressure from Last 3 Encounters:   01/24/18 130/80   11/29/17 123/67   05/17/17 120/75    Weight from Last 3 Encounters:   03/16/17 195 lb (88.5 kg)   05/12/16 215 lb (97.5 kg)   04/27/16 215 lb (97.5 kg)              Today, you had the following     No orders found for display         Where to get your medicines      These medications were sent to [facility] [address] [facility] [address] AT [facility] [address]  [facility] [employer] [facility] [address]     Phone:  [phone]     insulin degludec-liraglutide pen           Primary Care Provider Office Phone # Fax #    Maryam Clayton MD PhD [phone]       [address] [place] [id number]        Equal Access to Services     BRIE AHMADI : Hadii aad ku hadsophia Rodriguez, wamichaelda luqaidee, qaybta kaalmada julián, cullen trinidad laNanettetroy chauhan. So [facility] [phone].    ATENCIÓN: Si habla español, tiene a resendiz disposición servicios gratuitos de asistencia lingüística. Llame al [phone].    We comply with applicable federal civil rights laws and [place] laws. We do not discriminate on the basis of race, color, national origin, age, disability, sex, sexual orientation, or gender identity.            Thank you!     Thank you for choosing [facility] MEDICATION THERAPY MANAGEMENT  for your care. Our goal is always to provide you with excellent care. Hearing back from our patients is one way we can continue to improve our services. Please take a few minutes to complete the written survey that you may receive in the mail after your visit with us. Thank you!             Your Updated Medication List - Protect others around you: Learn how to safely use, store and throw away your medicines at www.disposemymeds.org.          This list is accurate as of 11/9/18  8:38 AM.  Always use your most recent med list.                   Brand Name Dispense Instructions for use Diagnosis    acetaminophen 500 MG tablet    TYLENOL     Take 500-1,000 mg by mouth every 6 hours as needed for mild pain        ADVIL 200 MG tablet   Generic drug:  ibuprofen      Take 200 mg by mouth every 4 hours as needed.        aspirin 81 MG tablet      Take 81 mg by mouth daily        blood glucose monitoring lancets     1 Box    Use to test blood sugar 2-3 times daily or as directed.  Ok to substitute alternative if insurance prefers.    Type 2 diabetes mellitus with diabetic neuropathy, without long-term current use of insulin (H)       blood glucose monitoring meter device kit      1 kit    Use to test blood sugar 2-3 times daily or as directed.  Ok to substitute alternative if insurance prefers.        blood glucose monitoring test strip    ACCU-CHEK CORTEZ PLUS    1 Box    Use to test blood sugar 2-3 times daily or as directed.  Ok to substitute alternative if insurance prefers.    Type 2 diabetes mellitus with diabetic neuropathy, without long-term current use of insulin (H)       calcium-vitamin D-vitamin K 500-500-40 MG-UNT-MCG Chew    VIACTIV     Take 1 tablet by mouth daily        FLUoxetine 10 MG capsule    PROzac    180 capsule    Take 2 capsules (20 mg) by mouth daily    Major depressive disorder, recurrent episode, mild (H)       glipiZIDE 10 MG 24 hr tablet    glipiZIDE XL    180 tablet    Take 2 tablets (20 mg) by mouth daily        insulin degludec-liraglutide pen    XULTOPHY 100/3.6    15 mL    Inject 18 Units Subcutaneous daily titrating as directed    Type 2 diabetes mellitus with diabetic neuropathy, without long-term current use of insulin (H)       insulin pen needle 31G X 8 MM     90 each    Use with Victoza injections once daily.    Type 2 diabetes mellitus with other specified complication, with long-term current use of insulin (H)       metFORMIN 1000 MG tablet    GLUCOPHAGE    180 tablet    Take 1 tablet (1,000 mg) by mouth 2 times daily (with meals)    Type 2 diabetes mellitus with complication, without long-term current use of insulin (H)       MULTIVITAMIN WOMEN 50+ PO      Take 1 tablet by mouth daily        nitroGLYcerin 0.4 MG sublingual tablet    NITROSTAT    25 tablet    For chest pain place 1 tablet under the tongue every 5 minutes for 3 doses. If symptoms persist 5 minutes after 1st dose call 911.    Chest tightness or pressure       pravastatin 40 MG tablet    PRAVACHOL    180 tablet    Take 2 tablets (80 mg) by mouth daily (please dispense as 40mg tabs)    Hyperlipidemia LDL goal <100       vitamin D 2000 units tablet      Take 1 tablet by mouth daily.

## 2018-11-09 NOTE — PROGRESS NOTES
SUBJECTIVE/OBJECTIVE:                Lindsay Nieves is a 72 year old female called for a follow-up visit for Medication Therapy Management.  She was referred to me from Dr. Clayton.     Chief Complaint: Follow up from our visit on 18.      Tobacco: History of tobacco dependence - quit   Alcohol: not currently using    Medication Adherence/Access:  no issues reported    Diabetes:  Pt currently taking Xultophy 18 units daily, glipizide XL 10 mg 2 tablets daily and metformin 2 g daily. Pt is not experiencing side effects. She is in need of a refill today.   SMBG: two times daily.   Ranges (patient reported):   FP-120 mg/dL  HS: 130-150 mg/dL unless she eats something she knows will raise her sugars  Patient is not experiencing hypoglycemia  Recent symptoms of high blood sugar? none  Eye exam: due  Foot exam: due  ACEi/ARB: No.   Urine Albumin:   Lab Results   Component Value Date    UMALCR Unable to calculate due to low value 2017      Aspirin: Taking 81mg daily and denies side effects  Diet/Exercise: She is mindful of carbs in her diet and exercises regularly    Lab Results   Component Value Date    A1C 7.0 2018    A1C 8.4 2017    A1C 9.5 2016    A1C 7.5 2016    A1C 8.7 2016     Last Comprehensive Metabolic Panel:  Sodium   Date Value Ref Range Status   2017 140 133 - 144 mmol/L Final     Potassium   Date Value Ref Range Status   2017 4.1 3.4 - 5.3 mmol/L Final     Chloride   Date Value Ref Range Status   2017 105 94 - 109 mmol/L Final     Carbon Dioxide   Date Value Ref Range Status   2017 25 20 - 32 mmol/L Final     Anion Gap   Date Value Ref Range Status   2017 10 3 - 14 mmol/L Final     Glucose   Date Value Ref Range Status   2017 178 (H) 70 - 99 mg/dL Final     Urea Nitrogen   Date Value Ref Range Status   2017 11 7 - 30 mg/dL Final     Creatinine   Date Value Ref Range Status   2017 0.64 0.52 - 1.04 mg/dL Final      GFR Estimate   Date Value Ref Range Status   02/08/2017 >90  Non  GFR Calc   >60 mL/min/1.7m2 Final     Calcium   Date Value Ref Range Status   02/08/2017 9.3 8.5 - 10.1 mg/dL Final     Today's Vitals: There were no vitals taken for this visit. - telemed      ASSESSMENT:              Current medications were reviewed today as discussed above.      Medication Adherence: excellent, no issues identified    Diabetes: Stable. Patient is meeting A1c goal of ~ 7%. Self monitoring of blood glucose is at goal of fasting  mg/dL and post prandial < 150 mg/dL. Pt would benefit from an updated A1c.    PLAN:                  1) Update A1c - orders are in the chart  2) Xultophy refill provided    I spent 15 minutes with this patient today. A copy of the visit note was provided to the patient's primary care provider.     Will follow up when lab results return.    The patient declined a summary of these recommendations as an after visit summary.    Cassandra Murdock, Pharm.D., BCACP  Medication Therapy Management Pharmacist  Page/VM:  [phone]

## 2018-12-03 DIAGNOSIS — E11.40 TYPE 2 DIABETES MELLITUS WITH DIABETIC NEUROPATHY, WITHOUT LONG-TERM CURRENT USE OF INSULIN (H): ICD-10-CM

## 2018-12-03 DIAGNOSIS — E78.5 HYPERLIPIDEMIA LDL GOAL <100: ICD-10-CM

## 2018-12-04 RX ORDER — LANCETS
EACH MISCELLANEOUS
Qty: 100 EACH | Refills: 1 | Status: SHIPPED | OUTPATIENT
Start: 2018-12-04 | End: 2019-02-06

## 2018-12-04 RX ORDER — PRAVASTATIN SODIUM 40 MG
80 TABLET ORAL DAILY
Qty: 180 TABLET | Refills: 0 | Status: SHIPPED | OUTPATIENT
Start: 2018-12-04 | End: 2019-01-09

## 2018-12-04 NOTE — TELEPHONE ENCOUNTER
Last Clinic Visit: 1/24/2018  [facility]  Pravachol refill: 90 day refill sent to pharmacy.  FYI to clinic CMA overdue lab: LDL  Past due for clinic f/u- Scheduling has been notified to contact the pt for appointment.

## 2018-12-17 DIAGNOSIS — E11.69 TYPE 2 DIABETES MELLITUS WITH OTHER SPECIFIED COMPLICATION, WITH LONG-TERM CURRENT USE OF INSULIN (H): ICD-10-CM

## 2018-12-17 DIAGNOSIS — E11.8 TYPE 2 DIABETES MELLITUS WITH COMPLICATION, WITHOUT LONG-TERM CURRENT USE OF INSULIN (H): ICD-10-CM

## 2018-12-17 DIAGNOSIS — E78.5 HYPERLIPIDEMIA LDL GOAL <100: ICD-10-CM

## 2018-12-17 DIAGNOSIS — Z79.4 TYPE 2 DIABETES MELLITUS WITH OTHER SPECIFIED COMPLICATION, WITH LONG-TERM CURRENT USE OF INSULIN (H): ICD-10-CM

## 2018-12-17 LAB
ANION GAP SERPL CALCULATED.3IONS-SCNC: 7 MMOL/L (ref 3–14)
BUN SERPL-MCNC: 13 MG/DL (ref 7–30)
CALCIUM SERPL-MCNC: 9 MG/DL (ref 8.5–10.1)
CHLORIDE SERPL-SCNC: 108 MMOL/L (ref 94–109)
CHOLEST SERPL-MCNC: 167 MG/DL
CO2 SERPL-SCNC: 26 MMOL/L (ref 20–32)
CREAT SERPL-MCNC: 0.62 MG/DL (ref 0.52–1.04)
GFR SERPL CREATININE-BSD FRML MDRD: >90 ML/MIN/1.7M2
GLUCOSE SERPL-MCNC: 189 MG/DL (ref 70–99)
HBA1C MFR BLD: 8.2 % (ref 0–5.6)
HDLC SERPL-MCNC: 66 MG/DL
LDLC SERPL CALC-MCNC: 77 MG/DL
NONHDLC SERPL-MCNC: 102 MG/DL
POTASSIUM SERPL-SCNC: 4.7 MMOL/L (ref 3.4–5.3)
SODIUM SERPL-SCNC: 140 MMOL/L (ref 133–144)
TRIGL SERPL-MCNC: 124 MG/DL

## 2018-12-18 ENCOUNTER — TELEPHONE (OUTPATIENT)
Dept: PHARMACY | Facility: CLINIC | Age: 73
End: 2018-12-18

## 2018-12-18 NOTE — TELEPHONE ENCOUNTER
I called Lindsay today to schedule diabetes follow-up today. We agree to visit on Friday, December 28th at 9:30 AM.     Cassandra Murdock, Pharm.D., [facility]  Medication Therapy Management Pharmacist  Page/VM:  [phone]

## 2018-12-21 DIAGNOSIS — E11.8 TYPE 2 DIABETES MELLITUS WITH COMPLICATION, WITHOUT LONG-TERM CURRENT USE OF INSULIN (H): ICD-10-CM

## 2018-12-28 ENCOUNTER — ALLIED HEALTH/NURSE VISIT (OUTPATIENT)
Dept: PHARMACY | Facility: CLINIC | Age: 73
End: 2018-12-28
Payer: COMMERCIAL

## 2018-12-28 DIAGNOSIS — E11.40 TYPE 2 DIABETES MELLITUS WITH DIABETIC NEUROPATHY, WITHOUT LONG-TERM CURRENT USE OF INSULIN (H): Primary | ICD-10-CM

## 2018-12-28 PROCEDURE — 99607 MTMS BY PHARM ADDL 15 MIN: CPT | Mod: U4 | Performed by: PHARMACIST

## 2018-12-28 PROCEDURE — 99606 MTMS BY PHARM EST 15 MIN: CPT | Mod: U4 | Performed by: PHARMACIST

## 2018-12-28 NOTE — PROGRESS NOTES
SUBJECTIVE/OBJECTIVE:                Lindsay Nieves is a 72 year old female called for a follow-up visit for Medication Therapy Management.  She was referred to me from Dr. Clayton. She will be switching her insurance in January.  The switch was complicated so we are unsure whether Xultophy will be covered in the new year.     Chief Complaint: Follow up from our visit on 18.      Tobacco: History of tobacco dependence - quit   Alcohol: not currently using    Medication Adherence/Access:  no issues reported    Diabetes:  Pt currently taking Xultophy 18 units daily, glipizide XL 10 mg 2 tablets daily and metformin 2 g daily. Pt is not experiencing side effects. She is in need of a refill today.   SMBG: two times daily.   Ranges (patient reported):     FP's     Patient is not experiencing hypoglycemia  Recent symptoms of high blood sugar? none  Eye exam: due  Foot exam: due  ACEi/ARB: No.   Urine Albumin:   Lab Results   Component Value Date    UMALCR Unable to calculate due to low value 2017      Aspirin: Taking 81mg daily and denies side effects  Diet/Exercise: She has not been as diligent with her diet or exercise recently which she attributes to her increased BG    Lab Results   Component Value Date    A1C 8.2 2018    A1C 7.0 2018    A1C 8.4 2017    A1C 9.5 2016    A1C 7.5 2016     Last Comprehensive Metabolic Panel:  Sodium   Date Value Ref Range Status   2018 140 133 - 144 mmol/L Final     Potassium   Date Value Ref Range Status   2018 4.7 3.4 - 5.3 mmol/L Final     Chloride   Date Value Ref Range Status   2018 108 94 - 109 mmol/L Final     Carbon Dioxide   Date Value Ref Range Status   2018 26 20 - 32 mmol/L Final     Anion Gap   Date Value Ref Range Status   2018 7 3 - 14 mmol/L Final     Glucose   Date Value Ref Range Status   2018 189 (H) 70 - 99 mg/dL Final     Urea Nitrogen   Date Value Ref Range Status   2018 13 7 -  30 mg/dL Final     Creatinine   Date Value Ref Range Status   12/17/2018 0.62 0.52 - 1.04 mg/dL Final     GFR Estimate   Date Value Ref Range Status   12/17/2018 >90 >60 mL/min/1.7m2 Final     Comment:     Non  GFR Calc     Calcium   Date Value Ref Range Status   12/17/2018 9.0 8.5 - 10.1 mg/dL Final     Today's Vitals: There were no vitals taken for this visit. - telemed      ASSESSMENT:              Current medications were reviewed today as discussed above.      Medication Adherence: excellent, no issues identified    Diabetes: Needs Improvement. Patient is not meeting A1c goal of < 7%. Self monitoring of blood glucose is not at goal of fasting  mg/dL. Pt would benefit from a dose increase in Xultophy.    PLAN:                  1) Increase Xultophy to 20 units daily.     I spent 30 minutes with this patient today. A copy of the visit note was provided to the patient's primary care provider.     Will follow up in 2 weeks.    The patient declined a summary of these recommendations as an after visit summary.    Cassandra Murdock, Pharm.D., BCACP  Medication Therapy Management Pharmacist  Page/VM:  [phone]

## 2019-01-03 ENCOUNTER — PATIENT OUTREACH (OUTPATIENT)
Dept: CARE COORDINATION | Facility: CLINIC | Age: 74
End: 2019-01-03

## 2019-01-09 ENCOUNTER — OFFICE VISIT (OUTPATIENT)
Dept: FAMILY MEDICINE | Facility: CLINIC | Age: 74
End: 2019-01-09
Payer: MEDICARE

## 2019-01-09 VITALS — HEART RATE: 74 BPM | DIASTOLIC BLOOD PRESSURE: 77 MMHG | OXYGEN SATURATION: 97 % | SYSTOLIC BLOOD PRESSURE: 122 MMHG

## 2019-01-09 DIAGNOSIS — M94.9 DISORDER OF BONE AND CARTILAGE: ICD-10-CM

## 2019-01-09 DIAGNOSIS — Z12.31 ENCOUNTER FOR SCREENING MAMMOGRAM FOR BREAST CANCER: ICD-10-CM

## 2019-01-09 DIAGNOSIS — M89.9 DISORDER OF BONE AND CARTILAGE: ICD-10-CM

## 2019-01-09 DIAGNOSIS — Z00.00 ANNUAL PHYSICAL EXAM: Primary | ICD-10-CM

## 2019-01-09 DIAGNOSIS — E78.5 HYPERLIPIDEMIA LDL GOAL <100: ICD-10-CM

## 2019-01-09 DIAGNOSIS — F33.40 RECURRENT MAJOR DEPRESSIVE DISORDER, IN REMISSION (H): ICD-10-CM

## 2019-01-09 DIAGNOSIS — E11.40 TYPE 2 DIABETES MELLITUS WITH DIABETIC NEUROPATHY, UNSPECIFIED WHETHER LONG TERM INSULIN USE (H): ICD-10-CM

## 2019-01-09 DIAGNOSIS — Z86.0100 HISTORY OF COLONIC POLYPS: ICD-10-CM

## 2019-01-09 RX ORDER — PRAVASTATIN SODIUM 40 MG
80 TABLET ORAL DAILY
Qty: 180 TABLET | Refills: 3 | Status: SHIPPED | OUTPATIENT
Start: 2019-01-09 | End: 2019-08-07

## 2019-01-09 RX ORDER — GLIPIZIDE 10 MG/1
20 TABLET, FILM COATED, EXTENDED RELEASE ORAL DAILY
Qty: 180 TABLET | Refills: 3 | Status: SHIPPED | OUTPATIENT
Start: 2019-01-09 | End: 2019-08-07

## 2019-01-09 SDOH — HEALTH STABILITY: PHYSICAL HEALTH: ON AVERAGE, HOW MANY MINUTES DO YOU ENGAGE IN EXERCISE AT THIS LEVEL?: 20 MIN

## 2019-01-09 SDOH — HEALTH STABILITY: PHYSICAL HEALTH: ON AVERAGE, HOW MANY DAYS PER WEEK DO YOU ENGAGE IN MODERATE TO STRENUOUS EXERCISE (LIKE A BRISK WALK)?: 2 DAYS

## 2019-01-09 SDOH — HEALTH STABILITY: MENTAL HEALTH
STRESS IS WHEN SOMEONE FEELS TENSE, NERVOUS, ANXIOUS, OR CAN'T SLEEP AT NIGHT BECAUSE THEIR MIND IS TROUBLED. HOW STRESSED ARE YOU?: TO SOME EXTENT

## 2019-01-09 ASSESSMENT — PAIN SCALES - GENERAL: PAINLEVEL: NO PAIN (0)

## 2019-01-09 ASSESSMENT — ACTIVITIES OF DAILY LIVING (ADL)
IN_THE_PAST_7_DAYS,_DID_YOU_NEED_HELP_FROM_OTHERS_TO_PERFORM_EVERYDAY_ACTIVITIES_SUCH_AS_EATING,_GETTING_DRESSED,_GROOMING,_BATHING,_WALKING,_OR_USING_THE_TOILET: N
IN_THE_PAST_7_DAYS,_DID_YOU_NEED_HELP_FROM_OTHERS_TO_TAKE_CARE_OF_THINGS_SUCH_AS_LAUNDRY_AND_HOUSEKEEPING,_BANKING,_SHOPPING,_USING_THE_TELEPHONE,_FOOD_PREPARATION,_TRANSPORTATION,_OR_TAKING_YOUR_OWN_MEDICATIONS?: N

## 2019-01-09 ASSESSMENT — PATIENT HEALTH QUESTIONNAIRE - PHQ9: SUM OF ALL RESPONSES TO PHQ QUESTIONS 1-9: 0

## 2019-01-09 NOTE — PROGRESS NOTES
[facility]  [facility] Center   Maryam Clayton MD PhD  01/09/2019     REASON for VISIT annual exam and diabetic check  We discussed annual exam versus medicare wellness and she wanted an annual.     History of Present Illness:   Lindsay Nieves is a 73 year old female with a history of type II diabetes mellitus with neuropathy, recurrent UTI's, arthritis, hyperlipidemia, and depression who presents for an annual exam and diabetic check. She is [id number] PM female, with no vaginal bleeding, itching or burning.  Pap's have been normal  - last one was 2013 and previous was 2011 and both normal, but is no longer receiving pap smears.      Mammogram is due. Last one on 11/29/2017.    DXA was 11/2015 and mild osteopenia.  Vit D3 2000IU/day and milk 8-10oz/day, yogurt daily, cottage cheese few times/wk, some vegetables. Takes a multivitamin, takes viactive 1x per daily.   Exercise: walking 0.5 miles (~20 minutes) 2x/wk and weight lifting      Diabetes: checking sugars every morning (following with Martin, sees her this week). On metformin, glipizide, and Xultophy. Takes her medication a few hours before bed. Checks in the morning usually in the 170s, but currently going down to lows 100's. This morning her blood sugar was 76. Denies any shaking.   A1c on 12/18/18, 8.2%   Eye exam was 8/2017 - no retina change - has cataracts - due for eye exam  Feet - no sores, has tingling in both feet  ASA +  Dental appt was late summer, 2018   microalb in 5/2017 and normal and is due   Had flu shot in 09/2018    Colonoscopy was 3/2017 - found 6 polyps and repeat in 3 years. Due in 2020.   Hx of high cholesterol:  On pravastatin - in the evening. 12/17/18 WNL - cholesterol 167, triglycerides 124, HDL 66, LDL 77, Non    Has not received Shingrix yet. She will receive it at a local pharmacy.   Hx of chest tightness - Had an adenosine test in 2/2017 that was normal. Today, denies any chest tightness or chest pressure.     She  discontinued her depression medications. She is feeling good.   Reports mild stress.   Would like medication refills.      Review of Systems:   Pertinent items are noted in HPI, remainder of complete ROS is negative.    Answers for HPI/ROS submitted by the patient on 1/9/2019   General Symptoms: No  Skin Symptoms: No  HENT Symptoms: No  EYE SYMPTOMS: No  HEART SYMPTOMS: No  LUNG SYMPTOMS: No  INTESTINAL SYMPTOMS: No  URINARY SYMPTOMS: No  GYNECOLOGIC SYMPTOMS: No  BREAST SYMPTOMS: No  SKELETAL SYMPTOMS: No  BLOOD SYMPTOMS: No  NERVOUS SYSTEM SYMPTOMS: No  MENTAL HEALTH SYMPTOMS: No    Active Medications:      acetaminophen (TYLENOL) 500 MG tablet, Take 500-1,000 mg by mouth every 6 hours as needed for mild pain, Disp: , Rfl:      aspirin 81 MG tablet, Take 81 mg by mouth daily, Disp: , Rfl:      blood glucose monitoring (ACCU-CHEK CORTEZ PLUS) meter device kit, Use to test blood sugar 2-3 times daily or as directed.  Ok to substitute alternative if insurance prefers., Disp: 1 kit, Rfl: 0     blood glucose monitoring (ACCU-CHEK CORTEZ PLUS) test strip, Use to test blood sugar 2-3 times daily or as directed. Call clinic to schedule follow up appointment [phone], Disp: 100 each, Rfl: 1     blood glucose monitoring (SOFTCLIX) lancets, Use to test blood sugar 2-3 times daily or as directed. Call clinic to schedule follow up appointment,[phone], Disp: 100 each, Rfl: 1     calcium-vitamin D-vitamin K (VIACTIV) 500-500-40 MG-UNT-MCG CHEW, Take 1 tablet by mouth daily, Disp: , Rfl:      Cholecalciferol (VITAMIN D) 2000 UNITS tablet, Take 1 tablet by mouth daily. , Disp: , Rfl:      glipiZIDE (GLIPIZIDE XL) 10 MG 24 hr tablet, Take 2 tablets (20 mg) by mouth daily, Disp: 180 tablet, Rfl: 3     ibuprofen (ADVIL) 200 MG tablet, Take 200 mg by mouth every 4 hours as needed., Disp: , Rfl:      insulin degludec-liraglutide (XULTOPHY 100/3.6) pen, Inject 20 Units Subcutaneous daily titrating as directed, Disp: 15 mL, Rfl:  3     insulin pen needle 31G X 8 MM, Use with Victoza injections once daily., Disp: 90 each, Rfl: 3     metFORMIN (GLUCOPHAGE) 1000 MG tablet, Take 1 tablet (1,000 mg) by mouth 2 times daily (with meals), Disp: 180 tablet, Rfl: 3     Multiple Vitamins-Minerals (MULTIVITAMIN WOMEN 50+ PO), Take 1 tablet by mouth daily, Disp: , Rfl:      pravastatin (PRAVACHOL) 40 MG tablet, Take 2 tablets (80 mg) by mouth daily (please dispense as 40mg tabs) Call clinic to schedule follow up appointment, [phone], Disp: 180 tablet, Rfl: 0      Allergies:   Patient has no known allergies.      Past Medical History:  Arthritis  Type II diabetes mellitus  Spinal stenosis   Hyperlipidemia  Colon polyps   Diabetic neuropathy      Past Surgical History:  Inject steroid  Release trigger finger     Immunizations:  Immunization History   Administered Date(s) Administered     HepB 01/25/2016, 03/30/2016, 09/14/2016     Influenza (High Dose) 3 valent vaccine 10/10/2014, 09/26/2016, 10/04/2017, 09/07/2018     Influenza (IIV3) PF 10/05/2011, 10/03/2012     Pneumo Conj 13-V (2010&after) 11/03/2014     Pneumococcal (PCV 7) 04/12/2006     Pneumococcal 23 valent 02/25/2013     TDAP Vaccine (Boostrix) 02/25/2013     Tetanus 09/19/2005     Zoster vaccine, live 05/18/2009     Family History:   Mother: diabetes, cerebrovascular disease, blood disease  Father: cardiovascular disease  Maternal grandmother: basal cell carcinoma  Brother: leiden disorder, skin cancer, prostate cancer, basal cell carcinoma       Social History:   The patient is , a former smoker, and rarely consumes alcohol.      Physical Exam:   /77 (BP Location: Right arm, Patient Position: Sitting)   Pulse 74   LMP  (LMP Unknown)   SpO2 97%   Breastfeeding? No    Gen:  older female in NAD  HEENT: PERRL fundi narrow arterioles  Ears clear with good light reflex.  OP MMM no lesions.  No drainage  Neck  Supple.  Thyroid not palpable.  No carotid bruits.  No LAD  CVS exam:  S1, S2 normal, no murmur, click, rub or gallop, regular rate and rhythm, chest is clear without rales or wheezing, no pedal edema, no JVD, no hepatosplenomegaly.  Respiratory: Normal - Clear to auscultation without rales, rhonchi, or wheezing.  BREAST:  normal without suspicious masses, skin changes or axillary nodes, symmetric fibrous changes in both upper outer quadrants   Pelvic exam: Naturally dry and atrophic. Thin, pale vaginal mucosa. Cervix is stenotic and pale. No Lesions. Bimanual is Limited due to weight. No gross masses.   Rectal exam: No masses.   Abd Soft ND NT  BS present  No masses or HSM  Ext   Good pulses. No edema  Musculoskeletal: spine is straight, extremities full ROM, no deformity  Neuro: Neurological exam reveals normal without focal findings, mental status, speech normal, alert and oriented x iii, JOS, cranial nerves 2-12 intact, muscle tone and strength normal and symmetric, reflexes normal and symmetric.   Foot exam: Moderate callus on left 3rd metatarsal area and  right 4th metatarsal area. Sensation on  1-5 metatarsal area intact bilaterally , diminished sensation on the medial right heel, can feel the heel in 3 areas on the left foot.    Labs:  Component Latest Ref Rng & Units 12/17/2018   Sodium 133 - 144 mmol/L 140   Potassium 3.4 - 5.3 mmol/L 4.7   Chloride 94 - 109 mmol/L 108   Carbon Dioxide 20 - 32 mmol/L 26   Anion Gap 3 - 14 mmol/L 7   Glucose 70 - 99 mg/dL 189 (H)   Urea Nitrogen 7 - 30 mg/dL 13   Creatinine 0.52 - 1.04 mg/dL 0.62   GFR Estimate >60 mL/min/1.7m2 >90   GFR Estimate If Black >60 mL/min/1.7m2 >90   Calcium 8.5 - 10.1 mg/dL 9.0   Cholesterol <200 mg/dL 167   Triglycerides <150 mg/dL 124   HDL Cholesterol >49 mg/dL 66   LDL Cholesterol Calculated <100 mg/dL 77   Non HDL Cholesterol <130 mg/dL 102   Hemoglobin A1C 0 - 5.6 % 8.2 (H)     Assessment and Plan:  Lindsay is a 73 year old female with history of diabetes, osteopenia, and colon polyps who was seen today for  physical and diabetic check.     (Z00.00) Annual physical exam  (primary encounter diagnosis)  Comment: Last mammogram 11/2017. Pap not indicated.  Immunizations UTD. Gave information about shingrix   Plan: Mammogram, routine screening        Due for mammogram.     (M89.9,  M94.9) Disorder of bone and cartilage  Comment: Last DEXA in 11/2017. Has mild osteopenia. Discussed calcium and Vitamin D.   Plan: Get another DEXA 11/2019. Continue calcium and vitamin D.     (Z86.010) History of colonic polyps  Comment: Last colonoscopy was 2017. Had polyps noted, recommended follow-up in 3 years.  Plan: Next colonoscopy in 2020.    (E11.8) Type 2 diabetes mellitus with complication, without long-term current use of insulin (H)  Comment: recently had an A1c that was 8.2. Has been working with Martin and is currently on metformin, glipizide, and Xultophy.  Monitoring sugars better with improvement. Due for eye exam. Is on aspriin. Due for microalbuminuria. Did foot exam today, has mild neuropathy. Has some tingling in her feet, but foot exam today shows minimal diminished monofilament on the right heel.   Plan: metFORMIN (GLUCOPHAGE) 1000 MG tablet   Hemoglobin A1c (3 MO), Albumin Random Urine         Quantitative with Creat Ratio, glipiZIDE         (GLIPIZIDE XL) 10 MG 24 hr tablet,         OPHTHALMOLOGY ADULT REFERRAL  insulin degludec-liraglutide (XULTOPHY 100/3.6)        Pen  insulin pen needle (31G X 8 MM) 31G X 8 MM         miscellaneous        Follow-up in 3 months for an A1c.     (E78.5) Hyperlipidemia LDL goal <100  Comment: She is on pravastatin. Recent lipids at reasonable goal.   Plan: pravastatin (PRAVACHOL) 40 MG tablet        MDD - she feels that this has  resolved and no longer needs medication. Her PHQ 9 today is 0.     Follow-up: Return in about 3 months (around 4/9/2019).      Scribe Disclosure:   Annie SAMANIEGO, am serving as a scribe to document services personally performed by Maryam Clayton MD  PhD at this visit, based upon the provider's statements to me. All documentation has been reviewed by the aforementioned provider prior to being entered into the official medical record.     Portions of this medical record were completed by a scribe. UPON MY REVIEW AND AUTHENTICATION BY ELECTRONIC SIGNATURE, this confirms (a) I performed the applicable clinical services, and (b) the record is accurate.

## 2019-01-09 NOTE — NURSING NOTE
Chief Complaint   Patient presents with     Physical     Patient here for annual physical      Elli Valencia at 11:22 AM on 1/9/2019.

## 2019-01-11 ENCOUNTER — TELEPHONE (OUTPATIENT)
Dept: PHARMACY | Facility: CLINIC | Age: 74
End: 2019-01-11

## 2019-01-11 ENCOUNTER — TELEPHONE (OUTPATIENT)
Dept: FAMILY MEDICINE | Facility: CLINIC | Age: 74
End: 2019-01-11

## 2019-01-11 ENCOUNTER — ALLIED HEALTH/NURSE VISIT (OUTPATIENT)
Dept: PHARMACY | Facility: CLINIC | Age: 74
End: 2019-01-11
Payer: COMMERCIAL

## 2019-01-11 DIAGNOSIS — E11.40 TYPE 2 DIABETES MELLITUS WITH DIABETIC NEUROPATHY, WITHOUT LONG-TERM CURRENT USE OF INSULIN (H): Primary | ICD-10-CM

## 2019-01-11 DIAGNOSIS — R30.0 DYSURIA: Primary | ICD-10-CM

## 2019-01-11 PROCEDURE — 99606 MTMS BY PHARM EST 15 MIN: CPT | Performed by: PHARMACIST

## 2019-01-11 RX ORDER — CIPROFLOXACIN 250 MG/1
250 TABLET, FILM COATED ORAL 2 TIMES DAILY
Qty: 6 TABLET | Refills: 0 | Status: SHIPPED | OUTPATIENT
Start: 2019-01-11 | End: 2019-06-20

## 2019-01-11 NOTE — TELEPHONE ENCOUNTER
Patient leaves a voicemail describing painful, frequent, burning and smelly urine today. She is requesting a prescription for Cipro before the weekend if possible. Bactrim has not been effective in the recent past. Routing to Dr. Clayton and Dr. Clayton's RN for follow-up.    Cassandra Murdock, Pharm.D., [facility]  Medication Therapy Management Pharmacist  Page/VM:  [phone]

## 2019-01-11 NOTE — TELEPHONE ENCOUNTER
Patient was reached by phone and RN reviewed patient's symptoms, which started since patient saw Dr. Clayton 2 days ago.  Patient relayed that Cipro has helped in the past with similar symptoms.  An Rx for Cipro was sent to pharmacy, and patient was advised to call clinic back if symptoms do not improve by next week.    Sarah Iglesias RN on 1/11/2019 at 2:50 PM

## 2019-01-11 NOTE — PROGRESS NOTES
SUBJECTIVE/OBJECTIVE:                Lindsay Nieves is a 72 year old female called for a follow-up visit for Medication Therapy Management.  She was referred to me from Dr. Clayton.     Chief Complaint: Follow up from our visit on 18.      Tobacco: History of tobacco dependence - quit   Alcohol: not currently using    Medication Adherence/Access:  no issues reported    Diabetes:  Pt currently taking Xultophy 20 units daily, glipizide XL 10 mg 2 tablets daily and metformin 2 g daily. Pt is not experiencing side effects. She has questions about whether she will be able to go back to [facility] possibly if she is able to keep up her diet changes.   SMBG: two times daily.   Ranges (patient reported):     FP, 160 (right away after dose change) 122, 119, 59, 100, 76 (last 5 days)    Patient is not experiencing hypoglycemia  Recent symptoms of high blood sugar? none  Eye exam: due  Foot exam: due  ACEi/ARB: No.   Urine Albumin:   Lab Results   Component Value Date    UMALCR Unable to calculate due to low value 2017      Aspirin: Taking 81mg daily and denies side effects  Diet/Exercise: She has been very diligent with reducing carbs and refined sugars since we last talked.  She was very surprised to see that she was low this morning. This morning, after her low, she had granola with raisins and fruit. In the last few weeks, she has eliminated second helpings of food and being mindful of portion sizes. She is feeling satisfied with these changes and is not going hungry.     Lab Results   Component Value Date    A1C 8.2 2018    A1C 7.0 2018    A1C 8.4 2017    A1C 9.5 2016    A1C 7.5 2016     Last Comprehensive Metabolic Panel:  Sodium   Date Value Ref Range Status   2018 140 133 - 144 mmol/L Final     Potassium   Date Value Ref Range Status   2018 4.7 3.4 - 5.3 mmol/L Final     Chloride   Date Value Ref Range Status   2018 108 94 - 109 mmol/L Final     Carbon  Dioxide   Date Value Ref Range Status   12/17/2018 26 20 - 32 mmol/L Final     Anion Gap   Date Value Ref Range Status   12/17/2018 7 3 - 14 mmol/L Final     Glucose   Date Value Ref Range Status   12/17/2018 189 (H) 70 - 99 mg/dL Final     Urea Nitrogen   Date Value Ref Range Status   12/17/2018 13 7 - 30 mg/dL Final     Creatinine   Date Value Ref Range Status   12/17/2018 0.62 0.52 - 1.04 mg/dL Final     GFR Estimate   Date Value Ref Range Status   12/17/2018 >90 >60 mL/min/1.7m2 Final     Comment:     Non  GFR Calc     Calcium   Date Value Ref Range Status   12/17/2018 9.0 8.5 - 10.1 mg/dL Final     Today's Vitals: LMP  (LMP Unknown)  - telemed    ASSESSMENT:              Current medications were reviewed today as discussed above.      Medication Adherence: excellent, no issues identified    Diabetes: Improved. Patient is not meeting A1c goal of < 7%. Self monitoring of blood glucose is at goal of fasting  mg/dL in recent 10 days in response to the dose increase and lifestyle changes. Pt would benefit from a dose decrease in Xultophy and re-education of hypoglycemia.     PLAN:                  1) Decrease Xultophy to 18 units daily.     I spent 15 minutes with this patient today. A copy of the visit note was provided to the patient's primary care provider.     Will follow up in 2 weeks.    The patient declined a summary of these recommendations as an after visit summary.    Cassandra Murdock, Pharm.D., BCACP  Medication Therapy Management Pharmacist  Page/VM:  [phone]

## 2019-01-11 NOTE — TELEPHONE ENCOUNTER
[facility] Call Center    Phone Message    May a detailed message be left on voicemail: yes    Reason for Call: Medication Question or concern regarding medication   Prescription Clarification  Name of Medication: Cipro  Prescribing Provider:    Pharmacy: [facility] [id number] [facility] [id number] AT [facility] [address]   What on the order needs clarification? Requesting this medication due to UTI          Action Taken: Message routed to:  [facility]

## 2019-01-21 ENCOUNTER — ANCILLARY PROCEDURE (OUTPATIENT)
Dept: MAMMOGRAPHY | Facility: CLINIC | Age: 74
End: 2019-01-21
Payer: MEDICARE

## 2019-01-21 DIAGNOSIS — Z12.31 ENCOUNTER FOR SCREENING MAMMOGRAM FOR BREAST CANCER: ICD-10-CM

## 2019-01-21 DIAGNOSIS — Z00.00 ANNUAL PHYSICAL EXAM: ICD-10-CM

## 2019-01-25 ENCOUNTER — ALLIED HEALTH/NURSE VISIT (OUTPATIENT)
Dept: PHARMACY | Facility: CLINIC | Age: 74
End: 2019-01-25
Payer: COMMERCIAL

## 2019-01-25 DIAGNOSIS — E11.40 TYPE 2 DIABETES MELLITUS WITH DIABETIC NEUROPATHY, WITHOUT LONG-TERM CURRENT USE OF INSULIN (H): Primary | ICD-10-CM

## 2019-01-25 PROCEDURE — 99606 MTMS BY PHARM EST 15 MIN: CPT | Performed by: PHARMACIST

## 2019-01-25 NOTE — PROGRESS NOTES
SUBJECTIVE/OBJECTIVE:                Lindsay Nieves is a 72 year old female called for a follow-up visit for Medication Therapy Management.  She was referred to me from Dr. Clayton.     Chief Complaint: Follow up from our visit on 19.      Tobacco: History of tobacco dependence - quit   Alcohol: not currently using    Medication Adherence/Access:  no issues reported    Diabetes:  Pt currently taking Xultophy 18 units daily, glipizide XL 10 mg 2 tablets daily and metformin 2 g daily. Pt is not experiencing side effects. She would like to know if we can reduce her dose again today to avoid further lows.   SMBG: two times daily.   Ranges (patient reported):     FP, 100, 118, 76, 102, 88, 59 (no other lows)  HS: 130, 140, 150, 126     Patient has experienced hypoglycemia once since we last talked.   Recent symptoms of high blood sugar? none  Eye exam: due  Foot exam: due  ACEi/ARB: No.   Urine Albumin:   Lab Results   Component Value Date    UMALCR Unable to calculate due to low value 2017      Aspirin: Taking 81mg daily and denies side effects  Diet/Exercise: She has been diligent with diet changes and it still feeling satisfied with food.     Lab Results   Component Value Date    A1C 8.2 2018    A1C 7.0 2018    A1C 8.4 2017    A1C 9.5 2016    A1C 7.5 2016     Last Comprehensive Metabolic Panel:  Sodium   Date Value Ref Range Status   2018 140 133 - 144 mmol/L Final     Potassium   Date Value Ref Range Status   2018 4.7 3.4 - 5.3 mmol/L Final     Chloride   Date Value Ref Range Status   2018 108 94 - 109 mmol/L Final     Carbon Dioxide   Date Value Ref Range Status   2018 26 20 - 32 mmol/L Final     Anion Gap   Date Value Ref Range Status   2018 7 3 - 14 mmol/L Final     Glucose   Date Value Ref Range Status   2018 189 (H) 70 - 99 mg/dL Final     Urea Nitrogen   Date Value Ref Range Status   2018 13 7 - 30 mg/dL Final      Creatinine   Date Value Ref Range Status   12/17/2018 0.62 0.52 - 1.04 mg/dL Final     GFR Estimate   Date Value Ref Range Status   12/17/2018 >90 >60 mL/min/1.7m2 Final     Comment:     Non  GFR Calc     Calcium   Date Value Ref Range Status   12/17/2018 9.0 8.5 - 10.1 mg/dL Final     Today's Vitals: LMP  (LMP Unknown)  - telemed    ASSESSMENT:              Current medications were reviewed today as discussed above.      Medication Adherence: excellent, no issues identified    Diabetes: Stable. Patient is not meeting A1c goal of < 7% but is meeting self monitoring of blood glucose is at goal of fasting  mg/dL and HS goal of <180 mg/dL. She reports one low and may benefit from a small dose reduction to improve safety of her therapy.     PLAN:                  1) Decrease Xultophy to 17 units daily.     I spent 15 minutes with this patient today. A copy of the visit note was provided to the patient's primary care provider.     Will follow up in 4 weeks.    The patient declined a summary of these recommendations as an after visit summary.    Cassandra Murdock, Pharm.D., BCACP  Medication Therapy Management Pharmacist  Page/VM:  [phone]

## 2019-02-06 DIAGNOSIS — E11.40 TYPE 2 DIABETES MELLITUS WITH DIABETIC NEUROPATHY, WITHOUT LONG-TERM CURRENT USE OF INSULIN (H): ICD-10-CM

## 2019-02-07 ENCOUNTER — TELEPHONE (OUTPATIENT)
Dept: FAMILY MEDICINE | Facility: CLINIC | Age: 74
End: 2019-02-07

## 2019-02-07 DIAGNOSIS — E11.40 TYPE 2 DIABETES MELLITUS WITH DIABETIC NEUROPATHY, WITHOUT LONG-TERM CURRENT USE OF INSULIN (H): ICD-10-CM

## 2019-02-07 RX ORDER — LANCETS
EACH MISCELLANEOUS
Qty: 300 EACH | Refills: 3 | Status: SHIPPED | OUTPATIENT
Start: 2019-02-07 | End: 2019-02-07

## 2019-02-07 RX ORDER — LANCETS
EACH MISCELLANEOUS
Qty: 300 EACH | Refills: 3 | Status: SHIPPED | OUTPATIENT
Start: 2019-02-07 | End: 2019-08-07

## 2019-02-07 NOTE — TELEPHONE ENCOUNTER
Rx was resent, due to the pharmacy deleting Rx that was sent on 2/6/19. Heidy Carvajal LPN 2/7/2019 10:22 AM

## 2019-02-07 NOTE — TELEPHONE ENCOUNTER
[facility] Call Center    Phone Message    May a detailed message be left on voicemail: yes    Reason for Call: Other: Pharmacy accidently deleted request for blood glucose monitoring (SOFTCLIX) lancets and is requesting to have it resent.  Please follow up with the pharmacy if needed.      Action Taken: Message routed to:  [facility]

## 2019-02-22 ENCOUNTER — ALLIED HEALTH/NURSE VISIT (OUTPATIENT)
Dept: PHARMACY | Facility: CLINIC | Age: 74
End: 2019-02-22
Payer: COMMERCIAL

## 2019-02-22 DIAGNOSIS — E11.40 TYPE 2 DIABETES MELLITUS WITH DIABETIC NEUROPATHY, WITHOUT LONG-TERM CURRENT USE OF INSULIN (H): Primary | ICD-10-CM

## 2019-02-22 PROCEDURE — 99606 MTMS BY PHARM EST 15 MIN: CPT | Performed by: PHARMACIST

## 2019-02-22 NOTE — PROGRESS NOTES
SUBJECTIVE/OBJECTIVE:                Lindsay Nieves is a 72 year old female called for a follow-up visit for Medication Therapy Management.  She was referred to me from Dr. Clayton.     Chief Complaint: Follow up from our visit on 19.      Tobacco: History of tobacco dependence - quit   Alcohol: not currently using    Medication Adherence/Access:  no issues reported    Diabetes:  Pt currently taking Xultophy 17 units daily, glipizide XL 10 mg 2 tablets daily and metformin 2 g daily. Pt is not experiencing side effects. She would like to know if we can reduce her dose again today to avoid further lows.   SMBG: two times daily.   Ranges (patient reported):     FP-100's, no lows, 178 mg/dL after treats  HS: 150, 160, 180 at the highest, once in awhile into the 200-225 mg/dL     Patient has experienced hypoglycemia once since we last talked.   Recent symptoms of high blood sugar? none  Eye exam: due  Foot exam: due  ACEi/ARB: No.   Urine Albumin:   Lab Results   Component Value Date    UMALCR Unable to calculate due to low value 2017      Aspirin: Taking 81mg daily and denies side effects  Diet/Exercise: She is still working hard to reduce her carbs and sugars.  She is having more treats right now due to the difficult winter we have been having. They are actually considering moving at this time to reduce risk for falls with inclement weather in the winter.     Lab Results   Component Value Date    A1C 8.2 2018    A1C 7.0 2018    A1C 8.4 2017    A1C 9.5 2016    A1C 7.5 2016     Last Comprehensive Metabolic Panel:  Sodium   Date Value Ref Range Status   2018 140 133 - 144 mmol/L Final     Potassium   Date Value Ref Range Status   2018 4.7 3.4 - 5.3 mmol/L Final     Chloride   Date Value Ref Range Status   2018 108 94 - 109 mmol/L Final     Carbon Dioxide   Date Value Ref Range Status   2018 26 20 - 32 mmol/L Final     Anion Gap   Date Value Ref Range  Status   12/17/2018 7 3 - 14 mmol/L Final     Glucose   Date Value Ref Range Status   12/17/2018 189 (H) 70 - 99 mg/dL Final     Urea Nitrogen   Date Value Ref Range Status   12/17/2018 13 7 - 30 mg/dL Final     Creatinine   Date Value Ref Range Status   12/17/2018 0.62 0.52 - 1.04 mg/dL Final     GFR Estimate   Date Value Ref Range Status   12/17/2018 >90 >60 mL/min/1.7m2 Final     Comment:     Non  GFR Calc     Calcium   Date Value Ref Range Status   12/17/2018 9.0 8.5 - 10.1 mg/dL Final     Today's Vitals: LMP  (LMP Unknown)  - telemed    ASSESSMENT:              Current medications were reviewed today as discussed above.      Medication Adherence: excellent, no issues identified    Diabetes: Needs Improvement. Patient is not meeting A1c goal of < 8%. Self monitoring of blood glucose is at goal of fasting  mg/dL and post prandial < 180 mg/dL. Pt would benefit from continued SMBG monitoring and continued diligence with carbs. She would also benefit from an updated A1c in March or April.     PLAN:                  1) A1c ordered. Patient plans to get this done in April.     I spent 15 minutes with this patient today. A copy of the visit note was provided to the patient's primary care provider.     Will follow up after A1c returns.  The patient declined a summary of these recommendations as an after visit summary.    Cassandra Murdock, Pharm.D., BCACP  Medication Therapy Management Pharmacist  Page/VM:  [phone]

## 2019-03-11 DIAGNOSIS — E11.40 TYPE 2 DIABETES MELLITUS WITH DIABETIC NEUROPATHY, WITHOUT LONG-TERM CURRENT USE OF INSULIN (H): ICD-10-CM

## 2019-03-20 ENCOUNTER — MEDICAL CORRESPONDENCE (OUTPATIENT)
Dept: HEALTH INFORMATION MANAGEMENT | Facility: CLINIC | Age: 74
End: 2019-03-20

## 2019-04-05 ENCOUNTER — TELEPHONE (OUTPATIENT)
Dept: PHARMACY | Facility: CLINIC | Age: 74
End: 2019-04-05

## 2019-04-05 NOTE — TELEPHONE ENCOUNTER
Lindsay leaves a voicemail for me stating she would like to reschedule our televisit today due to a family emergency. She states that she will me back next week to reschedule. Her appointment is cancelled today and I will wait for her to call next week to reschedule.     Cassandra Murdock, Pharm.D., [facility]  Medication Therapy Management Pharmacist  Page/VM:  [phone]

## 2019-04-15 NOTE — TELEPHONE ENCOUNTER
I called Lindsay today because I did not hear back from her as she mentioned in her last message. LVM asking her to call back when convenient.

## 2019-06-20 ENCOUNTER — OFFICE VISIT (OUTPATIENT)
Dept: OPHTHALMOLOGY | Facility: CLINIC | Age: 74
End: 2019-06-20
Attending: OPHTHALMOLOGY
Payer: MEDICARE

## 2019-06-20 DIAGNOSIS — H25.13 AGE-RELATED NUCLEAR CATARACT OF BOTH EYES: Primary | ICD-10-CM

## 2019-06-20 DIAGNOSIS — H43.812 VITREOUS DEGENERATION, LEFT: ICD-10-CM

## 2019-06-20 PROCEDURE — 92015 DETERMINE REFRACTIVE STATE: CPT | Mod: GY,ZF

## 2019-06-20 PROCEDURE — G0463 HOSPITAL OUTPT CLINIC VISIT: HCPCS | Mod: ZF

## 2019-06-20 ASSESSMENT — REFRACTION_WEARINGRX
OS_SPHERE: +1.25
OD_AXIS: 023
OD_CYLINDER: +0.50
OS_ADD: +2.75
OD_ADD: +2.75
OD_SPHERE: +2.50
SPECS_TYPE: TRIFOCAL
OS_CYLINDER: SPHERE

## 2019-06-20 ASSESSMENT — REFRACTION_MANIFEST
OD_ADD: +2.75
OS_SPHERE: +1.00
OS_AXIS: 166
OD_AXIS: 005
OS_ADD: +2.75
OD_CYLINDER: +0.50
OD_SPHERE: +2.50
OS_CYLINDER: +1.00

## 2019-06-20 ASSESSMENT — CONF VISUAL FIELD
METHOD: COUNTING FINGERS
OS_NORMAL: 1
OD_NORMAL: 1

## 2019-06-20 ASSESSMENT — VISUAL ACUITY
OS_CC: 20/40
OS_PH_CC: 20/30
CORRECTION_TYPE: GLASSES
METHOD_MR: PT REQUESTS MRX TODAY.
OD_CC: 20/25
METHOD: SNELLEN - LINEAR

## 2019-06-20 ASSESSMENT — EXTERNAL EXAM - LEFT EYE: OS_EXAM: NORMAL

## 2019-06-20 ASSESSMENT — TONOMETRY
IOP_METHOD: TONOPEN
OD_IOP_MMHG: 17
OS_IOP_MMHG: 16

## 2019-06-20 ASSESSMENT — CUP TO DISC RATIO
OS_RATIO: 0.2
OD_RATIO: 0.2

## 2019-06-20 ASSESSMENT — EXTERNAL EXAM - RIGHT EYE: OD_EXAM: NORMAL

## 2019-06-20 ASSESSMENT — SLIT LAMP EXAM - LIDS
COMMENTS: NORMAL
COMMENTS: NORMAL

## 2019-06-20 NOTE — NURSING NOTE
Chief Complaints and History of Present Illnesses   Patient presents with     Diabetic Eye Exam Follow Up     2 year follow up DM       Chief Complaint(s) and History of Present Illness(es)     Diabetic Eye Exam Follow Up     Comments: 2 year follow up DM                Comments     Pt states vision is slightly more blurry since last visit. Pt tries not to drive at night. Increased glare and halos when driving in the rain.  No eye pain today. No flashes or floaters.  DM2 BS: 120 yesterday per pt.  Lab Results       Component                Value               Date                       A1C                      8.2                 12/17/2018                 A1C                      7.0                 05/30/2018                 A1C                      8.4                 05/12/2017                 A1C                      9.5                 12/29/2016                 A1C                      7.5                 04/27/2016              \[facility] [place] [facility]\"" [facility] June 20, 2019 9:08 AM

## 2019-06-20 NOTE — PROGRESS NOTES
CC: Diabetes mellitus exam    HPI: Lindsay Nieves is a  71 year old year-old patient with history of Diabetes mellitus since 2004.  Reports no changes in vision, no flashes and floaters.  Last A1C was 7.8, (may, 17)     Hemoglobin A1C   Date Value Ref Range Status   12/17/2018 8.2 (H) 0 - 5.6 % Final     Comment:     Normal <5.7% Prediabetes 5.7-6.4%  Diabetes 6.5% or higher - adopted from ADA   consensus guidelines.       Patient taken glipizide.  No changes in vision no flashes and floaters     Assessment & Plan:  1. Diabetes mellitus II  No Diabetic retinopathy  Blood pressure (<120/80) and blood glucose (HbA1c <7.0) control discussed with patient. Patient advised that failure to adequately control each may lead to vision loss. The patient expressed understanding.    2. cataracts both eyes.   Observe    3. Posterior vitreous detachment (PVD)   Retina detachment and endophthalmitis precautions were discussed with the patient and was asked to return if any of the those occur    Follow up  1 yr with Optical Coherence Tomography   ~~~~~~~~~~~~~~~~~~~~~~~~~~~~~~~~~~   Complete documentation of historical and exam elements from today's encounter can be found in the full encounter summary report (not reduplicated in this progress note).  I personally obtained the chief complaint(s) and history of present illness.  I confirmed and edited as necessary the review of systems, past medical/surgical history, family history, social history, and examination findings as documented by others; and I examined the patient myself.  I personally reviewed the relevant tests, images, and reports as documented above.  I personally reviewed the ophthalmic test(s) associated with this encounter, agree with the interpretation(s) as documented by the resident/fellow, and have edited the corresponding report(s) as necessary.   I formulated and edited as necessary the assessment and plan and discussed the findings and management plan with the  patient and family    Crystal Solis MD  .  Retina Service   Department of Ophthalmology and Visual Neurosciences   [facility]  Phone: [phone]   Fax: [phone]

## 2019-07-01 ENCOUNTER — PRE VISIT (OUTPATIENT)
Dept: FAMILY MEDICINE | Facility: CLINIC | Age: 74
End: 2019-07-01

## 2019-07-01 DIAGNOSIS — E11.40 TYPE 2 DIABETES MELLITUS WITH DIABETIC NEUROPATHY, UNSPECIFIED WHETHER LONG TERM INSULIN USE (H): Primary | ICD-10-CM

## 2019-07-01 NOTE — TELEPHONE ENCOUNTER
[facility]  Dept: [phone]     Patient: Lindsay Nieves   : 1945  MRN: [id number]  Encounter: 19       Pre Visit Assessment    Health Maintenance Due   Topic Date Due     ZOSTER IMMUNIZATION (2 of 3) 2009     MICROALBUMIN  2018     DIABETIC FOOT EXAM  2018     TSH W/FREE T4 REFLEX  2019     A1C  2019     Chart Reviewed for Labs needed/Health Maintenance: Yes   If labs needed, orders placed:  Yes Hgb A1c, Urine micro albumin and TSH with Reflex,   Lab appointment scheduled:  Yes    Notes:  Patient confirmed they will attend appointment:  Yes  Appointment rescheduled?  No      Isaura Garcia at 1:09 PM on 2019

## 2019-08-07 ENCOUNTER — OFFICE VISIT (OUTPATIENT)
Dept: FAMILY MEDICINE | Facility: CLINIC | Age: 74
End: 2019-08-07
Payer: MEDICARE

## 2019-08-07 VITALS
HEART RATE: 83 BPM | RESPIRATION RATE: 16 BRPM | SYSTOLIC BLOOD PRESSURE: 118 MMHG | OXYGEN SATURATION: 98 % | TEMPERATURE: 98.2 F | DIASTOLIC BLOOD PRESSURE: 78 MMHG

## 2019-08-07 DIAGNOSIS — E78.5 HYPERLIPIDEMIA LDL GOAL <100: ICD-10-CM

## 2019-08-07 DIAGNOSIS — E11.40 TYPE 2 DIABETES MELLITUS WITH DIABETIC NEUROPATHY, UNSPECIFIED WHETHER LONG TERM INSULIN USE (H): Primary | ICD-10-CM

## 2019-08-07 DIAGNOSIS — E11.40 TYPE 2 DIABETES MELLITUS WITH DIABETIC NEUROPATHY, UNSPECIFIED WHETHER LONG TERM INSULIN USE (H): ICD-10-CM

## 2019-08-07 DIAGNOSIS — G47.00 INSOMNIA, UNSPECIFIED TYPE: ICD-10-CM

## 2019-08-07 LAB
HBA1C MFR BLD: 7.9 % (ref 0–5.6)
TSH SERPL DL<=0.005 MIU/L-ACNC: 2.81 MU/L (ref 0.4–4)

## 2019-08-07 RX ORDER — PRAVASTATIN SODIUM 40 MG
80 TABLET ORAL DAILY
Qty: 180 TABLET | Refills: 3 | Status: SHIPPED | OUTPATIENT
Start: 2019-08-07 | End: 2020-08-31

## 2019-08-07 RX ORDER — LANCETS
EACH MISCELLANEOUS
Qty: 300 EACH | Refills: 3 | Status: SHIPPED | OUTPATIENT
Start: 2019-08-07

## 2019-08-07 RX ORDER — GLIPIZIDE 10 MG/1
20 TABLET, FILM COATED, EXTENDED RELEASE ORAL DAILY
Qty: 180 TABLET | Refills: 3 | Status: SHIPPED | OUTPATIENT
Start: 2019-08-07 | End: 2020-10-20

## 2019-08-07 RX ORDER — ESZOPICLONE 1 MG/1
1 TABLET, FILM COATED ORAL AT BEDTIME
Qty: 30 TABLET | Refills: 0 | Status: SHIPPED | OUTPATIENT
Start: 2019-08-07 | End: 2019-10-29

## 2019-08-07 ASSESSMENT — PAIN SCALES - GENERAL: PAINLEVEL: NO PAIN (0)

## 2019-08-07 ASSESSMENT — ANXIETY QUESTIONNAIRES
1. FEELING NERVOUS, ANXIOUS, OR ON EDGE: MORE THAN HALF THE DAYS
3. WORRYING TOO MUCH ABOUT DIFFERENT THINGS: SEVERAL DAYS
6. BECOMING EASILY ANNOYED OR IRRITABLE: NOT AT ALL
5. BEING SO RESTLESS THAT IT IS HARD TO SIT STILL: NOT AT ALL
GAD7 TOTAL SCORE: 4
IF YOU CHECKED OFF ANY PROBLEMS ON THIS QUESTIONNAIRE, HOW DIFFICULT HAVE THESE PROBLEMS MADE IT FOR YOU TO DO YOUR WORK, TAKE CARE OF THINGS AT HOME, OR GET ALONG WITH OTHER PEOPLE: NOT DIFFICULT AT ALL
2. NOT BEING ABLE TO STOP OR CONTROL WORRYING: SEVERAL DAYS
7. FEELING AFRAID AS IF SOMETHING AWFUL MIGHT HAPPEN: NOT AT ALL

## 2019-08-07 ASSESSMENT — PATIENT HEALTH QUESTIONNAIRE - PHQ9
5. POOR APPETITE OR OVEREATING: NOT AT ALL
SUM OF ALL RESPONSES TO PHQ QUESTIONS 1-9: 5

## 2019-08-07 NOTE — NURSING NOTE
Chief Complaint   Patient presents with     Diabetes     Check up     Refill Request     Patient is moving and requests refills       Isaura Garcia, EMT

## 2019-08-07 NOTE — PROGRESS NOTES
[facility]  Primary Care Center   Maryam Clayton MD PhD  08/07/2019      Chief Complaint:   No chief complaint on file.       History of Present Illness:   Lindsay Nieves is a 73 year old female with a history of type 2 diabetes mellitus with neuropathy, hyperlipidemia, colon cancer, who presents for evaluation of ***.    Other topic discussed:  1.***    Review of Systems:   Pertinent items are noted in HPI, remainder of complete ROS is negative.      Active Medications:     Current Outpatient Medications:      acetaminophen (TYLENOL) 500 MG tablet, Take 500-1,000 mg by mouth every 6 hours as needed for mild pain, Disp: , Rfl:      aspirin 81 MG tablet, Take 81 mg by mouth daily, Disp: , Rfl:      blood glucose monitoring (ACCU-CHEK CORTEZ PLUS) meter device kit, Use to test blood sugar 2-3 times daily or as directed.  Ok to substitute alternative if insurance prefers., Disp: 1 kit, Rfl: 0     blood glucose monitoring (ACCU-CHEK CORTEZ PLUS) test strip, Use to test blood sugar 2-3 times daily or as directed. Call clinic to schedule follow up appointment [phone], Disp: 100 each, Rfl: 1     blood glucose monitoring (SOFTCLIX) lancets, Use to test blood sugar 2-3 times daily or as directed. Call clinic to schedule follow up appointment,[phone], Disp: 300 each, Rfl: 3     calcium-vitamin D-vitamin K (VIACTIV) 500-500-40 MG-UNT-MCG CHEW, Take 1 tablet by mouth daily, Disp: , Rfl:      Cholecalciferol (VITAMIN D) 2000 UNITS tablet, Take 1 tablet by mouth daily. , Disp: , Rfl:      glipiZIDE (GLIPIZIDE XL) 10 MG 24 hr tablet, Take 2 tablets (20 mg) by mouth daily, Disp: 180 tablet, Rfl: 3     ibuprofen (ADVIL) 200 MG tablet, Take 200 mg by mouth every 4 hours as needed., Disp: , Rfl:      insulin degludec-liraglutide (XULTOPHY 100/3.6) pen, Inject 17 Units Subcutaneous daily, Disp: 15 mL, Rfl: 3     insulin pen needle (31G X 8 MM) 31G X 8 MM miscellaneous, Use with Victoza injections once daily., Disp: 90 each,  Rfl: 3     metFORMIN (GLUCOPHAGE) 1000 MG tablet, Take 1 tablet (1,000 mg) by mouth 2 times daily (with meals), Disp: 180 tablet, Rfl: 3     Multiple Vitamins-Minerals (MULTIVITAMIN WOMEN 50+ PO), Take 1 tablet by mouth daily, Disp: , Rfl:      pravastatin (PRAVACHOL) 40 MG tablet, Take 2 tablets (80 mg) by mouth daily (please dispense as 40mg tabs) Call clinic to schedule follow up appointment, [phone], Disp: 180 tablet, Rfl: 3      Allergies:   Patient has no known allergies.      Past Medical History:  Colon polyps  Diabetes mellitus  Hyperlipidemia  Neuropathy  Scoliosis  Spinal stenosis  Depression  Type 2 diabetes mellitus    Past Surgical History:  Colonoscopy x3  Release trigger finger    Family History:   Mother: diabetes mellitus, cerebrovascular disease, blood disease (Leiden disorder)  Father: cardiovascular, glaucoma  Maternal grandmother: BCC  Brother: Leiden disorder, skin cancer, prostate cancer     Social History:     Former smoker, quit 2000    Physical Exam:   LMP  (LMP Unknown)    ***   Assessment and Plan:  There are no diagnoses linked to this encounter.        Follow-up: No follow-ups on file.         Scribe Disclosure:  I, Francisco Felix, am serving as a scribe to document services personally performed by Maryam Clayton MD PhD at this visit, based upon the provider's statements to me. All documentation has been reviewed by the aforementioned provider prior to being entered into the official medical record.    Portions of this medical record were completed by a scribe. UPON MY REVIEW AND AUTHENTICATION BY ELECTRONIC SIGNATURE, this confirms (a) I performed the applicable clinical services, and (b) the record is accurate.

## 2019-08-07 NOTE — PROGRESS NOTES
[facility]   Maryam Clayton MD PhD  08/07/2019      SUBJECTIVE:  Lindsay Nieves  Is a 73 year old female who presents today for follow up of diabetes mellitus type .2. Pt is also moving to [place] and wants written scripts today     Patient is being treated with oral agents, metformin and glipizide XL,and  insulin degludec-liraglutide.    Patient glucose self monitoring as follows: periodically.   Results as follows: fasting glucose- 120's    Lab Results   Component Value Date    A1C 7.9 08/07/2019    A1C 8.2 12/17/2018    A1C 7.0 05/30/2018    A1C 8.4 05/12/2017    A1C 9.5 12/29/2016       Current Outpatient Medications:      acetaminophen (TYLENOL) 500 MG tablet, Take 500-1,000 mg by mouth every 6 hours as needed for mild pain, Disp: , Rfl:      aspirin 81 MG tablet, Take 81 mg by mouth daily, Disp: , Rfl:      blood glucose monitoring (ACCU-CHEK CORTEZ PLUS) meter device kit, Use to test blood sugar 2-3 times daily or as directed.  Ok to substitute alternative if insurance prefers., Disp: 1 kit, Rfl: 0     blood glucose monitoring (ACCU-CHEK CORTEZ PLUS) test strip, Use to test blood sugar 2-3 times daily or as directed. Call clinic to schedule follow up appointment [phone], Disp: 100 each, Rfl: 1     blood glucose monitoring (SOFTCLIX) lancets, Use to test blood sugar 2-3 times daily or as directed. Call clinic to schedule follow up appointment,[phone], Disp: 300 each, Rfl: 3     calcium-vitamin D-vitamin K (VIACTIV) 500-500-40 MG-UNT-MCG CHEW, Take 1 tablet by mouth daily, Disp: , Rfl:      Cholecalciferol (VITAMIN D) 2000 UNITS tablet, Take 1 tablet by mouth daily. , Disp: , Rfl:      glipiZIDE (GLIPIZIDE XL) 10 MG 24 hr tablet, Take 2 tablets (20 mg) by mouth daily, Disp: 180 tablet, Rfl: 3     ibuprofen (ADVIL) 200 MG tablet, Take 200 mg by mouth every 4 hours as needed., Disp: , Rfl:      insulin degludec-liraglutide (XULTOPHY 100/3.6) pen, Inject 17 Units Subcutaneous  daily, Disp: 15 mL, Rfl: 3     insulin pen needle (31G X 8 MM) 31G X 8 MM miscellaneous, Use with Victoza injections once daily., Disp: 90 each, Rfl: 3     metFORMIN (GLUCOPHAGE) 1000 MG tablet, Take 1 tablet (1,000 mg) by mouth 2 times daily (with meals), Disp: 180 tablet, Rfl: 3     Multiple Vitamins-Minerals (MULTIVITAMIN WOMEN 50+ PO), Take 1 tablet by mouth daily, Disp: , Rfl:      pravastatin (PRAVACHOL) 40 MG tablet, Take 2 tablets (80 mg) by mouth daily (please dispense as 40mg tabs) Call clinic to schedule follow up appointment, [phone], Disp: 180 tablet, Rfl: 3    BP:   BP Readings from Last 3 Encounters:   19 118/78   19 122/77   18 130/80     Habits:   Social History     Tobacco Use     Smoking status: Former Smoker     Last attempt to quit: 2000     Years since quittin.8     Smokeless tobacco: Never Used   Substance Use Topics     Alcohol use: Yes     Alcohol/week: 0.0 oz     Comment:  minimal - 1 drink/wk or 1 drink/mo     Drug use: No     Diet: yes   ASA  Yes   Exercise currently moving, exercise with packing  Current Concerns  Patient : insomnia due to stress from moving, would like a supply of Lunesta. Otherwise doing well.     Patient Active Problem List   Diagnosis     Major depressive disorder, recurrent episode, mild (H)     Acne rosacea     Hyperlipidemia LDL goal <100     Disorder of bone and cartilage     Obesity     Cataracts, bilateral     Vitreous degeneration     History of colonic polyps     ACP (advance care planning)     Type 2 diabetes mellitus with diabetic neuropathy (H)       Medication allergies and current medications reviewed.  See EMR    Review Of Systems  Constitutional:No fevers, chills, or myalgias.  Skin: negative  Eyes: negative  Ears/Nose/Throat: negative  Respiratory: No shortness of breath, dyspnea on exertion, cough, or hemoptysis  Cardiovascular: negative  Gastrointestinal: negative  Genitourinary: negative  Musculoskeletal:  negative  Neurologic: positive for numbness or tingling of feet  Psychiatric: positive for sleep disturbance, anxiety and depression PHQ9=5 CHRIS = 4  Hematologic/Lymphatic/Immunologic: negative  Endocrine: has diabetes       OBJECTIVE:  CHRSI-7 SCORE 8/7/2019   Total Score 4     PHQ-9 SCORE 11/29/2017 1/9/2019 8/7/2019   PHQ-9 Total Score - - -   PHQ-9 Total Score 3 0 5       EXAM:  /78 (BP Location: Right arm, Patient Position: Sitting, Cuff Size: Adult Large)   Pulse 83   Temp 98.2  F (36.8  C) (Oral)   Resp 16   LMP  (LMP Unknown)   SpO2 98%   There is no height or weight on file to calculate BMI.  GENERAL APPEARANCE: healthy, alert and no distress  NECK: supple, no palpable thyroid, no nodes   RESP: lungs clear to auscultation - no rales, rhonchi or wheezes  CV: regular rates and rhythm, normal S1 S2, no S3 or S4 and no murmur, click or rub. No carotid bruits.   Extrem: no edema                            Foot Exam: Callus on 1st and  4th metatarsal of plantar surface left foot and metatarsals 1-4 of right foot.   2/4 DP and PT pulses, no trophic changes or ulcerative lesions    ASSESSMENT & PLAN: DiabetesType II:        Glycemic Control:   Sub-optimally controlled  Goal is A1C <7   Last A1C was 8.2%  12/2018  Planning continue current medication regimen unchanged as she is moving  Labs ordered as indicated, see EMR orders.  Eye exam by Ophthalmology recommended annually - was 6/19/19 and no retinopathy   Hemoglobin A1c obtained today - 7.9%  Information on diabetes given to the patient      Blood pressure:  Goal < 130/80mmHg  At goal? Yes  Planning continue current medication regimen unchanged.       Cholesterol:   Control   good  Goal LDL <  100   LDL 77  12/2018  Planning continue Pravastatin regimen unchanged.    ASA:  At goal? Yes      Other orders:  Eye referral next June 2020  Microalbumin creatitine ratio Patient will return to lab to complete  Flu shot No  No changes in the patient's current  treatment plan      Follow up: after moving with new provider in [place]  - printed scripts were given to the patient      Scribe Disclosure:   I, Francisco Felix, am serving as a scribe to document services personally performed by Serina Nava MD at this visit, based upon the provider's statements to me. All documentation has been reviewed by the aforementioned provider prior to being entered into the official medical record.     Portions of this medical record were completed by a scribe. UPON MY REVIEW AND AUTHENTICATION BY ELECTRONIC SIGNATURE, this confirms (a) I performed the applicable clinical services, and (b) the record is accurate.    Maryam Clayton MD PhD

## 2019-08-07 NOTE — PATIENT INSTRUCTIONS
Primary Care Center Medication Refill Request Information:  * Please contact your pharmacy regarding ANY request for medication refills.  ** [facility] Prescription Fax = [phone]  * Please allow 3 business days for routine medication refills.  * Please allow 5 business days for controlled substance medication refills.     Primary Care Center Test Result notification information:  *You will be notified with in 7-10 days of your appointment day regarding the results of your test.  If you are on MyChart you will be notified as soon as the provider has reviewed the results and signed off on them.    Get record release form and sign from [facility] provider in [place].  Printed scripts given to patient  Gave her 30 Lunesta as she is having difficulty with anticipation of the move

## 2019-08-08 ASSESSMENT — ANXIETY QUESTIONNAIRES: GAD7 TOTAL SCORE: 4

## 2019-10-29 DIAGNOSIS — G47.00 INSOMNIA: Primary | ICD-10-CM

## 2019-10-29 NOTE — TELEPHONE ENCOUNTER
Fax from [facility] in [place], [place] relays that Lunesta is not covered by insurance.  Preferred alternative is Zolpidem tablets.    Sarah Iglesias RN on 10/29/2019 at 11:57 AM      Printed and signed order for terri Clayton MD, PhD

## 2019-10-30 RX ORDER — ZOLPIDEM TARTRATE 5 MG/1
5 TABLET ORAL
Qty: 30 TABLET | Refills: 0 | Status: SHIPPED | OUTPATIENT
Start: 2019-10-30

## 2019-10-30 NOTE — TELEPHONE ENCOUNTER
Prescription for Ambien was faxed to [facility] # [id number] in [facility] - fax # [phone].    Sarah Iglesias RN on 10/30/2019 at 4:07 PM

## 2020-08-31 ENCOUNTER — TELEPHONE (OUTPATIENT)
Dept: INTERNAL MEDICINE | Facility: CLINIC | Age: 75
End: 2020-08-31

## 2020-08-31 DIAGNOSIS — E78.5 HYPERLIPIDEMIA LDL GOAL <100: ICD-10-CM

## 2020-08-31 RX ORDER — PRAVASTATIN SODIUM 40 MG
80 TABLET ORAL DAILY
Qty: 180 TABLET | Refills: 0 | Status: SHIPPED | OUTPATIENT
Start: 2020-08-31 | End: 2020-12-31

## 2020-08-31 NOTE — TELEPHONE ENCOUNTER
Fax from [facility] in [place], [place] requests refill of pravastatin (PRAVACHOL) 40 MG tablet.    Sarah Iglesias RN on 8/31/2020 at 3:00 PM

## 2020-10-20 ENCOUNTER — TELEPHONE (OUTPATIENT)
Dept: FAMILY MEDICINE | Facility: CLINIC | Age: 75
End: 2020-10-20

## 2020-10-20 DIAGNOSIS — E11.40 TYPE 2 DIABETES MELLITUS WITH DIABETIC NEUROPATHY, UNSPECIFIED WHETHER LONG TERM INSULIN USE (H): ICD-10-CM

## 2020-10-20 RX ORDER — GLIPIZIDE 10 MG/1
20 TABLET, FILM COATED, EXTENDED RELEASE ORAL DAILY
Qty: 180 TABLET | Refills: 0 | Status: SHIPPED | OUTPATIENT
Start: 2020-10-20

## 2020-10-20 NOTE — TELEPHONE ENCOUNTER
metFORMIN (GLUCOPHAGE) 1000 MG tablet  Last Written Prescription Date:  8/7/2019  Last Fill Quantity: 180,   # refills: 3  glipiZIDE (GLIPIZIDE XL) 10 MG 24 hr tablet  Last Written Prescription Date:  8/7/2019  Last Fill Quantity: 180,   # refills: 3  Last Office Visit : 8/17/2019  Future Office visit:  None    Routing refill request to provider for review/approval because:  Appt and labs due. 90 RF sent x 1, message to [facility] re: labs and appt    08/07/19  1743    A1C 7.9*     12/17/18  1151    CR 0.62

## 2020-12-31 DIAGNOSIS — E78.5 HYPERLIPIDEMIA LDL GOAL <100: ICD-10-CM

## 2020-12-31 RX ORDER — PRAVASTATIN SODIUM 40 MG
80 TABLET ORAL DAILY
Qty: 180 TABLET | Refills: 0 | Status: SHIPPED | OUTPATIENT
Start: 2020-12-31

## 2020-12-31 NOTE — TELEPHONE ENCOUNTER
Last Clinic Visit: 8/7/2019  [facility]  Overdue lab: LDL,  FYI to clinic  Scheduling has been notified to contact the pt for appointment.  90 day RF

## 2022-03-22 DIAGNOSIS — E11.40 TYPE 2 DIABETES MELLITUS WITH DIABETIC NEUROPATHY, UNSPECIFIED WHETHER LONG TERM INSULIN USE (H): ICD-10-CM

## 2022-03-25 NOTE — TELEPHONE ENCOUNTER
METFORMIN 1000MG TABLETS      Last Written Prescription Date:  10-20-20  Last Fill Quantity: 180,   # refills: 0  Last Office Visit : 8-7-19  Future Office visit:  none    Routing refill request to provider for review/approval because:  Overdue appt/ labs: Cr, A1c   -- FYI to clinic  Gap in RF

## 2022-03-28 ENCOUNTER — TELEPHONE (OUTPATIENT)
Dept: FAMILY MEDICINE | Facility: CLINIC | Age: 77
End: 2022-03-28
Payer: MEDICARE

## 2022-03-28 NOTE — TELEPHONE ENCOUNTER
[facility] Call Center    Phone Message    May a detailed message be left on voicemail: yes     Reason for Call: Other: prescription is getting filled by patient's new dr in [place]. Cancel prescription     Action Taken: Message routed to:  [facility]    Travel Screening: Not Applicable

## (undated) RX ORDER — SIMETHICONE 40MG/0.6ML
SUSPENSION, DROPS(FINAL DOSAGE FORM)(ML) ORAL
Status: DISPENSED
Start: 2017-03-16

## (undated) RX ORDER — REGADENOSON 0.08 MG/ML
INJECTION, SOLUTION INTRAVENOUS
Status: DISPENSED
Start: 2017-02-13

## (undated) RX ORDER — FENTANYL CITRATE 50 UG/ML
INJECTION, SOLUTION INTRAMUSCULAR; INTRAVENOUS
Status: DISPENSED
Start: 2017-03-16